# Patient Record
Sex: MALE | Race: BLACK OR AFRICAN AMERICAN | NOT HISPANIC OR LATINO | Employment: OTHER | URBAN - METROPOLITAN AREA
[De-identification: names, ages, dates, MRNs, and addresses within clinical notes are randomized per-mention and may not be internally consistent; named-entity substitution may affect disease eponyms.]

---

## 2023-05-08 NOTE — PROGRESS NOTES
Subjective   Patient ID: Flakito Mcfarlane is a 83 y.o. male who is long term resident being seen and evaluated for multiple medical problems and monthly visit.    HPI   A 82-year-old male who is a long term resident of [facility] at [facility]. He has a medical hx of Parkinson's disease, HTN, Dementia/Alzheimer, BPH, covid (1/18/23) and physical debility. Resident is poor historian but able to answer yes and no questions. Per discussion with nursing staff, resident tolerate PO diet, no complaint of pain during this time. He denies chest pain, SOB, afebrile, N/V, constipation per staff reported.     Review of Systems  Ros LIMITED DUE TO DEMENTIA   Objective   Weight: 193.0 5/5/2023 16:32  Blood Pressure: 135 /  71 5/1/2023 15:31  Temperature: 98.2 5/1/2023 15:31  Pulse: 76 5/1/2023 15:31  Respirations: 16 5/1/2023 15:31  Blood Sugar:     O2 sats: 96.0 % 5/1/2023 15:31  Height: 70.0 2/9/2022 11:40  Pain Level: 0 5/1/2023 15:31  LAB RESULT ON 5/19/23   CBC and Differential       White Blood Cell Count  4.26 k/uL 3.70-11.00  Final           RBC  4.01 m/uL 4.20-6.00 L Final           Hemoglobin  11.5 g/dL 13.0-17.0 L Final           Hematocrit  36.2 % 39.0-51.0 L Final           MCV  90.3 fL 80.0-100.0  Final           MCH  28.7 pg 26.0-34.0  Final           MCHC  31.8 g/dL 30.5-36.0  Final           RDW-CV  13.3 % 11.5-15.0  Final           Platelet Count  209 k/uL 150-400  Final           MPV  10.1 fL 9.0-12.7  Final           Neut%  34.2 %   Final           Abs Neut  1.46 k/uL 1.45-7.50  Final           Lymph%  42.3 %   Final           Abs Lymph  1.80 k/uL 1.00-4.00  Final           Mono%  14.8 %   Final           Abs Mono  0.63 k/uL <0.87  Final           Eosin%  7.7 %   Final           Abs Eosin  0.33 k/uL <0.46  Final           Baso%  0.5 %   Final           Abs Baso  <0.03 k/uL <0.11  Final           Immature Gran %%  0.5 %   Final           Abs Immature Gran  <0.03 k/uL <0.10  Final           Absolute  nRBC  0.0 /100 WBC   Final           Absolute nRBC  <0.01 k/uL <0.01  Final           Diff Type  Auto    Final       Comp Metabolic Panel       Protein, Total  6.2 g/dL 6.3-8.0 L Final           Albumin  3.7 g/dL 3.9-4.9 L Final           Calcium, Total  8.6 mg/dL 8.5-10.2  Final           Bilirubin, Total  0.4 mg/dL 0.2-1.3  Final           Alkaline Phosphatase  49 U/L   Final           AST  15 U/L 14-40  Final           ALT  7 U/L 10-54 L Final           Glucose  81 mg/dL 74-99  Final      The American Diabetes Association (ADA) provides guidance for cutoff values for fasting glucose and random glucose. The ADA defines fasting as no caloric intake for at least 8 hours. Fasting plasma glucose results between 100 to 125 mg/dL indicate increased risk for diabetes (prediabetes).  Fasting plasma glucose results greater than or equal to 126 mg/dL meet the criteria for diagnosis of diabetes. In the absence of unequivocal hyperglycemia, results should be confirmed by repeat testing. In a patient with classic symptoms of hyperglycemia or hyperglycemic crisis, random plasma glucose results greater than or equal to 200 mg/dL meet the criteria for diagnosis of diabetes.  Reference: Standards of Medical Care in Diabetes 2016, American Diabetes Association. Diabetes Care. 2016.39(Suppl 1).       BUN  15 mg/dL 9-24  Final           Creatinine  0.94 mg/dL 0.73-1.22  Final           Sodium  143 mmol/L 136-144  Final           Potassium  3.8 mmol/L 3.7-5.1  Final           Chloride  105 mmol/L   Final           CO2  27 mmol/L 22-30  Final           Anion Gap  11 mmol/L 9-18  Final           Estimated Glomerular Filtration Rate  80 mL/min/1.73 meters squared >=60  Final      Estimated Glomerular Filtration Rate (eGFR) is calculated using the 2021 CKD-EPI creatinine equation. This equation utilizes serum creatinine, sex, and age as parameters. The creatinine assay has traceable calibration to isotope dilution-mass  spectrometry. Refer to KDIGO guidelines for clinical interpretation. In patients with unstable renal function, e.g. those with acute kidney injury, the eGFR may not accurately reflect actual GFR.   MAGNESIUM  2.0 mg/dL 1.7-2.3  Final  Physical Exam  Constitutional: awake, afebrile, not in acute distress  Eyes: clear sclera  ENMT: mucous membranes moist  Head/Neck: neck supple, trachea midline, no JVD  Respiratory/Thorax: CTAB with non labored breathing   Cardiovascular: Regular rate   Gastrointestinal: soft, nt/nd/ng/nr  Musculoskeletal: Move all extremities   Extremities: no edema, no cellulitis  Neurological: A&O x1, able to answer simple questions   Psychological: Appropriate mood and behavior  Skin: warm and dry, intact  Assessment/Plan   #Advanced COPD; controlled, Patient denies SOB, PAULINO   -c/w Budesonide and albuterol as needed   #Dementia without behavioral disturbance   -Behavior monitoring for the following (1) mood changes(2) depressed mood (3) anxiety/agitation (4) hallucinations/delusions   -F/U with Psych   -c/w Medication treatment Plan   -c/w Memantine   #Parkinson's Disease (G20); stable    -monitor increase in tremors   -monitor labs   -consult neurology as needed   -c/w Carbidopa-Levodopa  #HTN; BP fairly controlled   - continue Hydralazine as needed, Lasix daily and Potassium supplement while on diuretic    #Chronic Pain Management   -c/w Tylenol  #BPH  - c/w Flomax    #Constipation   -c/w Senna, Miralax  #Physical debility  - keep safe and fall precaution  #Code status; full code   #Med reviewed  #Lab examined     Time spending 35 minutes including discussion about goal of care.  -Reviewed VS, BS, O2, etc as per protocol. Reviewed and signed off orders, medications, Labs, x-rays, and current diagnoses in PCC. Obtained history via interviewed nursing staff.   -Performing physical examination  -Patient's family updated and code status discussed. He remains full code.   -Ordering medications, lab    -Documenting clinical information in the  Epic  -Care coordinating with nursing staff

## 2023-05-09 ENCOUNTER — NURSING HOME VISIT (OUTPATIENT)
Dept: POST ACUTE CARE | Facility: EXTERNAL LOCATION | Age: 83
End: 2023-05-09
Payer: COMMERCIAL

## 2023-05-09 DIAGNOSIS — J44.9 ADVANCED COPD (MULTI): Primary | ICD-10-CM

## 2023-05-09 DIAGNOSIS — Z71.89 ACP (ADVANCE CARE PLANNING): ICD-10-CM

## 2023-05-09 DIAGNOSIS — N40.0 BENIGN PROSTATIC HYPERPLASIA, UNSPECIFIED WHETHER LOWER URINARY TRACT SYMPTOMS PRESENT: ICD-10-CM

## 2023-05-09 DIAGNOSIS — Z79.899 ENCOUNTER FOR MEDICATION REVIEW: ICD-10-CM

## 2023-05-09 DIAGNOSIS — I10 PRIMARY HYPERTENSION: ICD-10-CM

## 2023-05-09 DIAGNOSIS — F02.80 DEMENTIA DUE TO PARKINSON'S DISEASE, WITHOUT BEHAVIORAL DISTURBANCE, PSYCHOTIC DISTURBANCE, MOOD DISTURBANCE, OR ANXIETY, UNSPECIFIED DEMENTIA SEVERITY (MULTI): ICD-10-CM

## 2023-05-09 DIAGNOSIS — G20.A1 DEMENTIA DUE TO PARKINSON'S DISEASE, WITHOUT BEHAVIORAL DISTURBANCE, PSYCHOTIC DISTURBANCE, MOOD DISTURBANCE, OR ANXIETY, UNSPECIFIED DEMENTIA SEVERITY (MULTI): ICD-10-CM

## 2023-05-09 DIAGNOSIS — G20.A1 PARKINSON DISEASE (MULTI): ICD-10-CM

## 2023-05-09 DIAGNOSIS — R53.81 PHYSICAL DEBILITY: ICD-10-CM

## 2023-05-09 DIAGNOSIS — Z01.89 LABORATORY EXAMINATION: ICD-10-CM

## 2023-05-09 DIAGNOSIS — K59.00 CONSTIPATION, UNSPECIFIED CONSTIPATION TYPE: ICD-10-CM

## 2023-05-09 PROCEDURE — 99309 SBSQ NF CARE MODERATE MDM 30: CPT | Performed by: NURSE PRACTITIONER

## 2023-05-09 NOTE — LETTER
Patient: Flakito Mcfarlane  : 1940    Encounter Date: 2023    Subjective  Patient ID: Flakito Mcfarlane is a 83 y.o. male who is long term resident being seen and evaluated for multiple medical problems and monthly visit.    HPI   A 82-year-old male who is a long term resident of [facility] at [facility]. He has a medical hx of Parkinson's disease, HTN, Dementia/Alzheimer, BPH, covid (23) and physical debility. Resident is poor historian but able to answer yes and no questions. Per discussion with nursing staff, resident tolerate PO diet, no complaint of pain during this time. He denies chest pain, SOB, afebrile, N/V, constipation per staff reported.     Review of Systems  Ros LIMITED DUE TO DEMENTIA   Objective  Weight: 193.0 2023 16:32  Blood Pressure: 135 /  71 2023 15:31  Temperature: 98.2 2023 15:31  Pulse: 76 2023 15:31  Respirations: 16 2023 15:31  Blood Sugar:     O2 sats: 96.0 % 2023 15:31  Height: 70.0 2022 11:40  Pain Level: 0 2023 15:31  LAB RESULT ON 23   CBC and Differential       White Blood Cell Count  4.26 k/uL 3.70-11.00  Final           RBC  4.01 m/uL 4.20-6.00 L Final           Hemoglobin  11.5 g/dL 13.0-17.0 L Final           Hematocrit  36.2 % 39.0-51.0 L Final           MCV  90.3 fL 80.0-100.0  Final           MCH  28.7 pg 26.0-34.0  Final           MCHC  31.8 g/dL 30.5-36.0  Final           RDW-CV  13.3 % 11.5-15.0  Final           Platelet Count  209 k/uL 150-400  Final           MPV  10.1 fL 9.0-12.7  Final           Neut%  34.2 %   Final           Abs Neut  1.46 k/uL 1.45-7.50  Final           Lymph%  42.3 %   Final           Abs Lymph  1.80 k/uL 1.00-4.00  Final           Mono%  14.8 %   Final           Abs Mono  0.63 k/uL <0.87  Final           Eosin%  7.7 %   Final           Abs Eosin  0.33 k/uL <0.46  Final           Baso%  0.5 %   Final           Abs Baso  <0.03 k/uL <0.11  Final           Immature Gran %%  0.5 %   Final           Abs  Immature Gran  <0.03 k/uL <0.10  Final           Absolute nRBC  0.0 /100 WBC   Final           Absolute nRBC  <0.01 k/uL <0.01  Final           Diff Type  Auto    Final       Comp Metabolic Panel       Protein, Total  6.2 g/dL 6.3-8.0 L Final           Albumin  3.7 g/dL 3.9-4.9 L Final           Calcium, Total  8.6 mg/dL 8.5-10.2  Final           Bilirubin, Total  0.4 mg/dL 0.2-1.3  Final           Alkaline Phosphatase  49 U/L   Final           AST  15 U/L 14-40  Final           ALT  7 U/L 10-54 L Final           Glucose  81 mg/dL 74-99  Final      The American Diabetes Association (ADA) provides guidance for cutoff values for fasting glucose and random glucose. The ADA defines fasting as no caloric intake for at least 8 hours. Fasting plasma glucose results between 100 to 125 mg/dL indicate increased risk for diabetes (prediabetes).  Fasting plasma glucose results greater than or equal to 126 mg/dL meet the criteria for diagnosis of diabetes. In the absence of unequivocal hyperglycemia, results should be confirmed by repeat testing. In a patient with classic symptoms of hyperglycemia or hyperglycemic crisis, random plasma glucose results greater than or equal to 200 mg/dL meet the criteria for diagnosis of diabetes.  Reference: Standards of Medical Care in Diabetes 2016, American Diabetes Association. Diabetes Care. 2016.39(Suppl 1).       BUN  15 mg/dL 9-24  Final           Creatinine  0.94 mg/dL 0.73-1.22  Final           Sodium  143 mmol/L 136-144  Final           Potassium  3.8 mmol/L 3.7-5.1  Final           Chloride  105 mmol/L   Final           CO2  27 mmol/L 22-30  Final           Anion Gap  11 mmol/L 9-18  Final           Estimated Glomerular Filtration Rate  80 mL/min/1.73 meters squared >=60  Final      Estimated Glomerular Filtration Rate (eGFR) is calculated using the 2021 CKD-EPI creatinine equation. This equation utilizes serum creatinine, sex, and age as parameters. The creatinine assay  has traceable calibration to isotope dilution-mass spectrometry. Refer to KDIGO guidelines for clinical interpretation. In patients with unstable renal function, e.g. those with acute kidney injury, the eGFR may not accurately reflect actual GFR.   MAGNESIUM  2.0 mg/dL 1.7-2.3  Final  Physical Exam  Constitutional: awake, afebrile, not in acute distress  Eyes: clear sclera  ENMT: mucous membranes moist  Head/Neck: neck supple, trachea midline, no JVD  Respiratory/Thorax: CTAB with non labored breathing   Cardiovascular: Regular rate   Gastrointestinal: soft, nt/nd/ng/nr  Musculoskeletal: Move all extremities   Extremities: no edema, no cellulitis  Neurological: A&O x1, able to answer simple questions   Psychological: Appropriate mood and behavior  Skin: warm and dry, intact  Assessment/Plan  #Advanced COPD; controlled, Patient denies SOB, PAULINO   -c/w Budesonide and albuterol as needed   #Dementia without behavioral disturbance   -Behavior monitoring for the following (1) mood changes(2) depressed mood (3) anxiety/agitation (4) hallucinations/delusions   -F/U with Psych   -c/w Medication treatment Plan   -c/w Memantine   #Parkinson's Disease (G20); stable    -monitor increase in tremors   -monitor labs   -consult neurology as needed   -c/w Carbidopa-Levodopa  #HTN; BP fairly controlled   - continue Hydralazine as needed, Lasix daily and Potassium supplement while on diuretic    #Chronic Pain Management   -c/w Tylenol  #BPH  - c/w Flomax    #Constipation   -c/w Senna, Miralax  #Physical debility  - keep safe and fall precaution  #Code status; full code   #Med reviewed  #Lab examined     Time spending 35 minutes including discussion about goal of care.  -Reviewed VS, BS, O2, etc as per protocol. Reviewed and signed off orders, medications, Labs, x-rays, and current diagnoses in PCC. Obtained history via interviewed nursing staff.   -Performing physical examination  -Patient's family updated and code status discussed. He  remains full code.   -Ordering medications, lab   -Documenting clinical information in the  Epic  -Care coordinating with nursing staff      Electronically Signed By: BIANKA Dasilva-CNP   5/31/23 12:54 AM

## 2023-05-31 PROBLEM — R53.81 PHYSICAL DEBILITY: Status: ACTIVE | Noted: 2023-05-31

## 2023-05-31 PROBLEM — J44.9 ADVANCED COPD (MULTI): Status: ACTIVE | Noted: 2023-05-31

## 2023-05-31 PROBLEM — G20.A1: Status: ACTIVE | Noted: 2023-05-31

## 2023-05-31 PROBLEM — K59.00 CONSTIPATION: Status: ACTIVE | Noted: 2023-05-31

## 2023-05-31 PROBLEM — G20.A1 PARKINSON DISEASE (MULTI): Status: ACTIVE | Noted: 2023-05-31

## 2023-05-31 PROBLEM — Z71.89 ACP (ADVANCE CARE PLANNING): Status: ACTIVE | Noted: 2023-05-31

## 2023-05-31 PROBLEM — N40.0 BENIGN PROSTATIC HYPERPLASIA: Status: ACTIVE | Noted: 2023-05-31

## 2023-05-31 PROBLEM — I10 PRIMARY HYPERTENSION: Status: ACTIVE | Noted: 2023-05-31

## 2023-05-31 PROBLEM — F02.80: Status: ACTIVE | Noted: 2023-05-31

## 2023-06-21 ENCOUNTER — NURSING HOME VISIT (OUTPATIENT)
Dept: POST ACUTE CARE | Facility: EXTERNAL LOCATION | Age: 83
End: 2023-06-21
Payer: COMMERCIAL

## 2023-06-21 DIAGNOSIS — F02.818 LATE ONSET ALZHEIMER'S DISEASE WITH BEHAVIORAL DISTURBANCE (MULTI): Primary | ICD-10-CM

## 2023-06-21 DIAGNOSIS — F02.A0 MILD DEMENTIA DUE TO PARKINSON'S DISEASE, WITHOUT BEHAVIORAL DISTURBANCE, PSYCHOTIC DISTURBANCE, MOOD DISTURBANCE, OR ANXIETY (MULTI): ICD-10-CM

## 2023-06-21 DIAGNOSIS — N40.1 BENIGN PROSTATIC HYPERPLASIA WITH URINARY FREQUENCY: ICD-10-CM

## 2023-06-21 DIAGNOSIS — G30.1 LATE ONSET ALZHEIMER'S DISEASE WITH BEHAVIORAL DISTURBANCE (MULTI): Primary | ICD-10-CM

## 2023-06-21 DIAGNOSIS — R53.81 PHYSICAL DEBILITY: ICD-10-CM

## 2023-06-21 DIAGNOSIS — K59.03 DRUG-INDUCED CONSTIPATION: ICD-10-CM

## 2023-06-21 DIAGNOSIS — R35.0 BENIGN PROSTATIC HYPERPLASIA WITH URINARY FREQUENCY: ICD-10-CM

## 2023-06-21 DIAGNOSIS — G20.A1 MILD DEMENTIA DUE TO PARKINSON'S DISEASE, WITHOUT BEHAVIORAL DISTURBANCE, PSYCHOTIC DISTURBANCE, MOOD DISTURBANCE, OR ANXIETY (MULTI): ICD-10-CM

## 2023-06-21 DIAGNOSIS — J44.9 ADVANCED COPD (MULTI): ICD-10-CM

## 2023-06-21 DIAGNOSIS — G20.A1 PARKINSON DISEASE (MULTI): ICD-10-CM

## 2023-06-21 DIAGNOSIS — I10 PRIMARY HYPERTENSION: ICD-10-CM

## 2023-06-21 PROCEDURE — 99309 SBSQ NF CARE MODERATE MDM 30: CPT | Performed by: FAMILY MEDICINE

## 2023-07-04 ENCOUNTER — NURSING HOME VISIT (OUTPATIENT)
Dept: POST ACUTE CARE | Facility: EXTERNAL LOCATION | Age: 83
End: 2023-07-04
Payer: COMMERCIAL

## 2023-07-04 DIAGNOSIS — K59.03 DRUG-INDUCED CONSTIPATION: ICD-10-CM

## 2023-07-04 DIAGNOSIS — Z71.89 ACP (ADVANCE CARE PLANNING): ICD-10-CM

## 2023-07-04 DIAGNOSIS — G20.A1 MILD DEMENTIA DUE TO PARKINSON'S DISEASE, WITHOUT BEHAVIORAL DISTURBANCE, PSYCHOTIC DISTURBANCE, MOOD DISTURBANCE, OR ANXIETY (MULTI): ICD-10-CM

## 2023-07-04 DIAGNOSIS — N40.1 BENIGN PROSTATIC HYPERPLASIA WITH URINARY FREQUENCY: ICD-10-CM

## 2023-07-04 DIAGNOSIS — R53.81 PHYSICAL DEBILITY: ICD-10-CM

## 2023-07-04 DIAGNOSIS — F02.A0 MILD DEMENTIA DUE TO PARKINSON'S DISEASE, WITHOUT BEHAVIORAL DISTURBANCE, PSYCHOTIC DISTURBANCE, MOOD DISTURBANCE, OR ANXIETY (MULTI): ICD-10-CM

## 2023-07-04 DIAGNOSIS — J44.9 ADVANCED COPD (MULTI): ICD-10-CM

## 2023-07-04 DIAGNOSIS — G20.A1 PARKINSON DISEASE (MULTI): Primary | ICD-10-CM

## 2023-07-04 DIAGNOSIS — R35.0 BENIGN PROSTATIC HYPERPLASIA WITH URINARY FREQUENCY: ICD-10-CM

## 2023-07-04 DIAGNOSIS — I10 PRIMARY HYPERTENSION: ICD-10-CM

## 2023-07-04 PROCEDURE — 99309 SBSQ NF CARE MODERATE MDM 30: CPT | Performed by: FAMILY MEDICINE

## 2023-07-04 NOTE — LETTER
Patient: Flakito Mcfarlane  : 1940    Encounter Date: 2023    Subjective  Patient ID: Flakito Mcfarlane is a 83 y.o. male who is long term resident being seen and evaluated for multiple medical problems and monthly visit.    HPI   A 82-year-old male who is a long term resident of [facility] at [facility]. He has a medical hx of Parkinson's disease, HTN, Dementia/Alzheimer, BPH, covid (23) and physical debility. Resident is poor historian but able to answer yes and no questions. Per discussion with nursing staff, resident tolerate PO diet, no complaint of pain during this time. He denies chest pain, SOB, afebrile, N/V, constipation per staff reported.     Review of Systems  Ros LIMITED DUE TO DEMENTIA   Objective  Weight: 193.0 2023 16:32  Blood Pressure: 135 /  71 2023 15:31  Temperature: 98.2 2023 15:31  Pulse: 76 2023 15:31  Respirations: 16 2023 15:31  Blood Sugar:     O2 sats: 96.0 % 2023 15:31  Height: 70.0 2022 11:40  Pain Level: 0 2023 15:31  LAB RESULT ON 23   CBC and Differential       White Blood Cell Count  4.26 k/uL 3.70-11.00  Final           RBC  4.01 m/uL 4.20-6.00 L Final           Hemoglobin  11.5 g/dL 13.0-17.0 L Final           Hematocrit  36.2 % 39.0-51.0 L Final           MCV  90.3 fL 80.0-100.0  Final           MCH  28.7 pg 26.0-34.0  Final           MCHC  31.8 g/dL 30.5-36.0  Final           RDW-CV  13.3 % 11.5-15.0  Final           Platelet Count  209 k/uL 150-400  Final           MPV  10.1 fL 9.0-12.7  Final           Neut%  34.2 %   Final           Abs Neut  1.46 k/uL 1.45-7.50  Final           Lymph%  42.3 %   Final           Abs Lymph  1.80 k/uL 1.00-4.00  Final           Mono%  14.8 %   Final           Abs Mono  0.63 k/uL <0.87  Final           Eosin%  7.7 %   Final           Abs Eosin  0.33 k/uL <0.46  Final           Baso%  0.5 %   Final           Abs Baso  <0.03 k/uL <0.11  Final           Immature Gran %%  0.5 %   Final           Abs  Immature Gran  <0.03 k/uL <0.10  Final           Absolute nRBC  0.0 /100 WBC   Final           Absolute nRBC  <0.01 k/uL <0.01  Final           Diff Type  Auto    Final       Comp Metabolic Panel       Protein, Total  6.2 g/dL 6.3-8.0 L Final           Albumin  3.7 g/dL 3.9-4.9 L Final           Calcium, Total  8.6 mg/dL 8.5-10.2  Final           Bilirubin, Total  0.4 mg/dL 0.2-1.3  Final           Alkaline Phosphatase  49 U/L   Final           AST  15 U/L 14-40  Final           ALT  7 U/L 10-54 L Final           Glucose  81 mg/dL 74-99  Final      The American Diabetes Association (ADA) provides guidance for cutoff values for fasting glucose and random glucose. The ADA defines fasting as no caloric intake for at least 8 hours. Fasting plasma glucose results between 100 to 125 mg/dL indicate increased risk for diabetes (prediabetes).  Fasting plasma glucose results greater than or equal to 126 mg/dL meet the criteria for diagnosis of diabetes. In the absence of unequivocal hyperglycemia, results should be confirmed by repeat testing. In a patient with classic symptoms of hyperglycemia or hyperglycemic crisis, random plasma glucose results greater than or equal to 200 mg/dL meet the criteria for diagnosis of diabetes.  Reference: Standards of Medical Care in Diabetes 2016, American Diabetes Association. Diabetes Care. 2016.39(Suppl 1).       BUN  15 mg/dL 9-24  Final           Creatinine  0.94 mg/dL 0.73-1.22  Final           Sodium  143 mmol/L 136-144  Final           Potassium  3.8 mmol/L 3.7-5.1  Final           Chloride  105 mmol/L   Final           CO2  27 mmol/L 22-30  Final           Anion Gap  11 mmol/L 9-18  Final           Estimated Glomerular Filtration Rate  80 mL/min/1.73 meters squared >=60  Final      Estimated Glomerular Filtration Rate (eGFR) is calculated using the 2021 CKD-EPI creatinine equation. This equation utilizes serum creatinine, sex, and age as parameters. The creatinine assay  has traceable calibration to isotope dilution-mass spectrometry. Refer to KDIGO guidelines for clinical interpretation. In patients with unstable renal function, e.g. those with acute kidney injury, the eGFR may not accurately reflect actual GFR.   MAGNESIUM  2.0 mg/dL 1.7-2.3  Final  Physical Exam  Constitutional: awake, afebrile, not in acute distress  Eyes: clear sclera  ENMT: mucous membranes moist  Head/Neck: neck supple, trachea midline, no JVD  Respiratory/Thorax: CTAB with non labored breathing   Cardiovascular: Regular rate   Gastrointestinal: soft, nt/nd/ng/nr  Musculoskeletal: Move all extremities   Extremities: no edema, no cellulitis  Neurological: A&O x1, able to answer simple questions   Psychological: Appropriate mood and behavior  Skin: warm and dry, intact  Assessment/Plan  #Advanced COPD; controlled, Patient denies SOB, PAULINO   -c/w Budesonide and albuterol as needed   #Dementia without behavioral disturbance   -Behavior monitoring for the following (1) mood changes(2) depressed mood (3) anxiety/agitation (4) hallucinations/delusions   -F/U with Psych   -c/w Medication treatment Plan   -c/w Memantine   #Parkinson's Disease (G20); stable    -monitor increase in tremors   -monitor labs   -consult neurology as needed   -c/w Carbidopa-Levodopa  #HTN; BP fairly controlled   - continue Hydralazine as needed, Lasix daily and Potassium supplement while on diuretic    #Chronic Pain Management   -c/w Tylenol  #BPH  - c/w Flomax    #Constipation   -c/w Senna, Miralax  #Physical debility  - keep safe and fall precaution  #Code status; full code   #Med reviewed  #Lab examined   Problem List Items Addressed This Visit       Advanced COPD (CMS/HCC)    Dementia due to Parkinson's disease, without behavioral disturbance, psychotic disturbance, mood disturbance, or anxiety (CMS/HCC)    Parkinson disease (CMS/HCC) - Primary    Primary hypertension    Benign prostatic hyperplasia    Constipation    Physical debility     ACP (advance care planning)      Time spending 35 minutes including discussion about goal of care.  -Reviewed VS, BS, O2, etc as per protocol. Reviewed and signed off orders, medications, Labs, x-rays, and current diagnoses in PCC. Obtained history via interviewed nursing staff.   -Performing physical examination  -Patient's family updated and code status discussed. He remains full code.   -Ordering medications, lab   -Documenting clinical information in the [facility]  -Care coordinating with nursing staff      Electronically Signed By: Stephen Buckner MD   7/22/23  8:12 AM

## 2023-07-22 PROBLEM — F02.818 LATE ONSET ALZHEIMER'S DISEASE WITH BEHAVIORAL DISTURBANCE (MULTI): Status: ACTIVE | Noted: 2023-07-22

## 2023-07-22 PROBLEM — G30.1 LATE ONSET ALZHEIMER'S DISEASE WITH BEHAVIORAL DISTURBANCE (MULTI): Status: ACTIVE | Noted: 2023-07-22

## 2023-07-22 ASSESSMENT — PATIENT HEALTH QUESTIONNAIRE - PHQ9
SUM OF ALL RESPONSES TO PHQ9 QUESTIONS 1 AND 2: 0
1. LITTLE INTEREST OR PLEASURE IN DOING THINGS: NOT AT ALL
2. FEELING DOWN, DEPRESSED OR HOPELESS: NOT AT ALL

## 2023-07-22 NOTE — PROGRESS NOTES
Subjective   Patient ID: Flakito Mcfarlane is a 83 y.o. male who is long term resident being seen and evaluated for multiple medical problems and monthly visit.    HPI   A 82-year-old male who is a long term resident of [facility]. He has a medical hx of Parkinson's disease, HTN, Dementia/Alzheimer, BPH, covid (1/18/23) and physical debility. Resident is poor historian but able to answer yes and no questions. Per discussion with nursing staff, resident tolerate PO diet, no complaint of pain during this time. He denies chest pain, SOB, afebrile, N/V, constipation per staff reported.     Review of Systems  Ros LIMITED DUE TO DEMENTIA   Objective   Weight: 193.0 5/5/2023 16:32  Blood Pressure: 135 /  71 5/1/2023 15:31  Temperature: 98.2 5/1/2023 15:31  Pulse: 76 5/1/2023 15:31  Respirations: 16 5/1/2023 15:31  Blood Sugar:     O2 sats: 96.0 % 5/1/2023 15:31  Height: 70.0 2/9/2022 11:40  Pain Level: 0 5/1/2023 15:31  LAB RESULT ON 5/19/23   CBC and Differential       White Blood Cell Count  4.26 k/uL 3.70-11.00  Final           RBC  4.01 m/uL 4.20-6.00 L Final           Hemoglobin  11.5 g/dL 13.0-17.0 L Final           Hematocrit  36.2 % 39.0-51.0 L Final           MCV  90.3 fL 80.0-100.0  Final           MCH  28.7 pg 26.0-34.0  Final           MCHC  31.8 g/dL 30.5-36.0  Final           RDW-CV  13.3 % 11.5-15.0  Final           Platelet Count  209 k/uL 150-400  Final           MPV  10.1 fL 9.0-12.7  Final           Neut%  34.2 %   Final           Abs Neut  1.46 k/uL 1.45-7.50  Final           Lymph%  42.3 %   Final           Abs Lymph  1.80 k/uL 1.00-4.00  Final           Mono%  14.8 %   Final           Abs Mono  0.63 k/uL <0.87  Final           Eosin%  7.7 %   Final           Abs Eosin  0.33 k/uL <0.46  Final           Baso%  0.5 %   Final           Abs Baso  <0.03 k/uL <0.11  Final           Immature Gran %%  0.5 %   Final           Abs Immature Gran  <0.03 k/uL <0.10  Final           Absolute  nRBC  0.0 /100 WBC   Final           Absolute nRBC  <0.01 k/uL <0.01  Final           Diff Type  Auto    Final       Comp Metabolic Panel       Protein, Total  6.2 g/dL 6.3-8.0 L Final           Albumin  3.7 g/dL 3.9-4.9 L Final           Calcium, Total  8.6 mg/dL 8.5-10.2  Final           Bilirubin, Total  0.4 mg/dL 0.2-1.3  Final           Alkaline Phosphatase  49 U/L   Final           AST  15 U/L 14-40  Final           ALT  7 U/L 10-54 L Final           Glucose  81 mg/dL 74-99  Final      The American Diabetes Association (ADA) provides guidance for cutoff values for fasting glucose and random glucose. The ADA defines fasting as no caloric intake for at least 8 hours. Fasting plasma glucose results between 100 to 125 mg/dL indicate increased risk for diabetes (prediabetes).  Fasting plasma glucose results greater than or equal to 126 mg/dL meet the criteria for diagnosis of diabetes. In the absence of unequivocal hyperglycemia, results should be confirmed by repeat testing. In a patient with classic symptoms of hyperglycemia or hyperglycemic crisis, random plasma glucose results greater than or equal to 200 mg/dL meet the criteria for diagnosis of diabetes.  Reference: Standards of Medical Care in Diabetes 2016, American Diabetes Association. Diabetes Care. 2016.39(Suppl 1).       BUN  15 mg/dL 9-24  Final           Creatinine  0.94 mg/dL 0.73-1.22  Final           Sodium  143 mmol/L 136-144  Final           Potassium  3.8 mmol/L 3.7-5.1  Final           Chloride  105 mmol/L   Final           CO2  27 mmol/L 22-30  Final           Anion Gap  11 mmol/L 9-18  Final           Estimated Glomerular Filtration Rate  80 mL/min/1.73 meters squared >=60  Final      Estimated Glomerular Filtration Rate (eGFR) is calculated using the 2021 CKD-EPI creatinine equation. This equation utilizes serum creatinine, sex, and age as parameters. The creatinine assay has traceable calibration to isotope dilution-mass  spectrometry. Refer to KDIGO guidelines for clinical interpretation. In patients with unstable renal function, e.g. those with acute kidney injury, the eGFR may not accurately reflect actual GFR.   MAGNESIUM  2.0 mg/dL 1.7-2.3  Final  Physical Exam  Constitutional: awake, afebrile, not in acute distress  Eyes: clear sclera  ENMT: mucous membranes moist  Head/Neck: neck supple, trachea midline, no JVD  Respiratory/Thorax: CTAB with non labored breathing   Cardiovascular: Regular rate   Gastrointestinal: soft, nt/nd/ng/nr  Musculoskeletal: Move all extremities   Extremities: no edema, no cellulitis  Neurological: A&O x1, able to answer simple questions   Psychological: Appropriate mood and behavior  Skin: warm and dry, intact  Assessment/Plan   #Advanced COPD; controlled, Patient denies SOB, PAULINO   -c/w Budesonide and albuterol as needed   #Dementia without behavioral disturbance   -Behavior monitoring for the following (1) mood changes(2) depressed mood (3) anxiety/agitation (4) hallucinations/delusions   -F/U with Psych   -c/w Medication treatment Plan   -c/w Memantine   #Parkinson's Disease (G20); stable    -monitor increase in tremors   -monitor labs   -consult neurology as needed   -c/w Carbidopa-Levodopa  #HTN; BP fairly controlled   - continue Hydralazine as needed, Lasix daily and Potassium supplement while on diuretic    #Chronic Pain Management   -c/w Tylenol  #BPH  - c/w Flomax    #Constipation   -c/w Senna, Miralax  #Physical debility  - keep safe and fall precaution  #Code status; full code   #Med reviewed  #Lab examined   Problem List Items Addressed This Visit       Advanced COPD (CMS/[facility])    Dementia due to Parkinson's disease, without behavioral disturbance, psychotic disturbance, mood disturbance, or anxiety (CMS/HCC)    Parkinson disease (CMS/HCC) - Primary    Primary hypertension    Benign prostatic hyperplasia    Constipation    Physical debility    ACP (advance care planning)      Time spending 35  minutes including discussion about goal of care.  -Reviewed VS, BS, O2, etc as per protocol. Reviewed and signed off orders, medications, Labs, x-rays, and current diagnoses in PCC. Obtained history via interviewed nursing staff.   -Performing physical examination  -Patient's family updated and code status discussed. He remains full code.   -Ordering medications, lab   -Documenting clinical information in the [facility]  -Care coordinating with nursing staff

## 2023-07-22 NOTE — PROGRESS NOTES
Subjective   Patient ID: Flakito Mcfarlane is a 83 y.o. male who is long term resident being seen and evaluated for multiple medical problems and monthly visit.    HPI   A 82-year-old male who is a long term resident of [facility]. He has a medical hx of Parkinson's disease, HTN, Dementia/Alzheimer, BPH, covid (1/18/23) and physical debility. Resident is poor historian but able to answer yes and no questions. Per discussion with nursing staff, resident tolerate PO diet, no complaint of pain during this time. He denies chest pain, SOB, afebrile, N/V, constipation per staff reported.     Review of Systems  Ros LIMITED DUE TO DEMENTIA   Objective   Weight: 193.0 5/5/2023 16:32  Blood Pressure: 135 /  71 5/1/2023 15:31  Temperature: 98.2 5/1/2023 15:31  Pulse: 76 5/1/2023 15:31  Respirations: 16 5/1/2023 15:31  Blood Sugar:     O2 sats: 96.0 % 5/1/2023 15:31  Height: 70.0 2/9/2022 11:40  Pain Level: 0 5/1/2023 15:31  LAB RESULT ON 5/19/23   CBC and Differential       White Blood Cell Count  4.26 k/uL 3.70-11.00  Final           RBC  4.01 m/uL 4.20-6.00 L Final           Hemoglobin  11.5 g/dL 13.0-17.0 L Final           Hematocrit  36.2 % 39.0-51.0 L Final           MCV  90.3 fL 80.0-100.0  Final           MCH  28.7 pg 26.0-34.0  Final           MCHC  31.8 g/dL 30.5-36.0  Final           RDW-CV  13.3 % 11.5-15.0  Final           Platelet Count  209 k/uL 150-400  Final           MPV  10.1 fL 9.0-12.7  Final           Neut%  34.2 %   Final           Abs Neut  1.46 k/uL 1.45-7.50  Final           Lymph%  42.3 %   Final           Abs Lymph  1.80 k/uL 1.00-4.00  Final           Mono%  14.8 %   Final           Abs Mono  0.63 k/uL <0.87  Final           Eosin%  7.7 %   Final           Abs Eosin  0.33 k/uL <0.46  Final           Baso%  0.5 %   Final           Abs Baso  <0.03 k/uL <0.11  Final           Immature Gran %%  0.5 %   Final           Abs Immature Gran  <0.03 k/uL <0.10  Final           Absolute  nRBC  0.0 /100 WBC   Final           Absolute nRBC  <0.01 k/uL <0.01  Final           Diff Type  Auto    Final       Comp Metabolic Panel       Protein, Total  6.2 g/dL 6.3-8.0 L Final           Albumin  3.7 g/dL 3.9-4.9 L Final           Calcium, Total  8.6 mg/dL 8.5-10.2  Final           Bilirubin, Total  0.4 mg/dL 0.2-1.3  Final           Alkaline Phosphatase  49 U/L   Final           AST  15 U/L 14-40  Final           ALT  7 U/L 10-54 L Final           Glucose  81 mg/dL 74-99  Final      The American Diabetes Association (ADA) provides guidance for cutoff values for fasting glucose and random glucose. The ADA defines fasting as no caloric intake for at least 8 hours. Fasting plasma glucose results between 100 to 125 mg/dL indicate increased risk for diabetes (prediabetes).  Fasting plasma glucose results greater than or equal to 126 mg/dL meet the criteria for diagnosis of diabetes. In the absence of unequivocal hyperglycemia, results should be confirmed by repeat testing. In a patient with classic symptoms of hyperglycemia or hyperglycemic crisis, random plasma glucose results greater than or equal to 200 mg/dL meet the criteria for diagnosis of diabetes.  Reference: Standards of Medical Care in Diabetes 2016, American Diabetes Association. Diabetes Care. 2016.39(Suppl 1).       BUN  15 mg/dL 9-24  Final           Creatinine  0.94 mg/dL 0.73-1.22  Final           Sodium  143 mmol/L 136-144  Final           Potassium  3.8 mmol/L 3.7-5.1  Final           Chloride  105 mmol/L   Final           CO2  27 mmol/L 22-30  Final           Anion Gap  11 mmol/L 9-18  Final           Estimated Glomerular Filtration Rate  80 mL/min/1.73 meters squared >=60  Final      Estimated Glomerular Filtration Rate (eGFR) is calculated using the 2021 CKD-EPI creatinine equation. This equation utilizes serum creatinine, sex, and age as parameters. The creatinine assay has traceable calibration to isotope dilution-mass  spectrometry. Refer to KDIGO guidelines for clinical interpretation. In patients with unstable renal function, e.g. those with acute kidney injury, the eGFR may not accurately reflect actual GFR.   MAGNESIUM  2.0 mg/dL 1.7-2.3  Final  Physical Exam  Constitutional: awake, afebrile, not in acute distress  Eyes: clear sclera  ENMT: mucous membranes moist  Head/Neck: neck supple, trachea midline, no JVD  Respiratory/Thorax: CTAB with non labored breathing   Cardiovascular: Regular rate   Gastrointestinal: soft, nt/nd/ng/nr  Musculoskeletal: Move all extremities   Extremities: no edema, no cellulitis  Neurological: A&O x1, able to answer simple questions   Psychological: Appropriate mood and behavior  Skin: warm and dry, intact  Assessment/Plan   #Advanced COPD; controlled, Patient denies SOB, PAULINO   -c/w Budesonide and albuterol as needed   #Dementia without behavioral disturbance   -Behavior monitoring for the following (1) mood changes(2) depressed mood (3) anxiety/agitation (4) hallucinations/delusions   -F/U with Psych   -c/w Medication treatment Plan   -c/w Memantine   #Parkinson's Disease (G20); stable    -monitor increase in tremors   -monitor labs   -consult neurology as needed   -c/w Carbidopa-Levodopa  #HTN; BP fairly controlled   - continue Hydralazine as needed, Lasix daily and Potassium supplement while on diuretic    #Chronic Pain Management   -c/w Tylenol  #BPH  - c/w Flomax    #Constipation   -c/w Senna, Miralax  #Physical debility  - keep safe and fall precaution  #Code status; full code   #Med reviewed  #Lab examined   Problem List Items Addressed This Visit       Advanced COPD (CMS/HCC)    Dementia due to Parkinson's disease, without behavioral disturbance, psychotic disturbance, mood disturbance, or anxiety (CMS/HCC)    Parkinson disease (CMS/HCC)    Primary hypertension    Benign prostatic hyperplasia    Constipation    Physical debility    Late onset Alzheimer's disease with behavioral disturbance  (CMS/[facility]) - Primary      Time spending 35 minutes including discussion about goal of care.  -Reviewed VS, BS, O2, etc as per protocol. Reviewed and signed off orders, medications, Labs, x-rays, and current diagnoses in PCC. Obtained history via interviewed nursing staff.   -Performing physical examination  -Patient's family updated and code status discussed. He remains full code.   -Ordering medications, lab   -Documenting clinical information in the [facility]  -Care coordinating with nursing staff

## 2024-04-26 ENCOUNTER — NURSING HOME VISIT (OUTPATIENT)
Dept: POST ACUTE CARE | Facility: EXTERNAL LOCATION | Age: 84
End: 2024-04-26
Payer: COMMERCIAL

## 2024-04-26 DIAGNOSIS — G20.A1 MILD DEMENTIA DUE TO PARKINSON'S DISEASE, WITHOUT BEHAVIORAL DISTURBANCE, PSYCHOTIC DISTURBANCE, MOOD DISTURBANCE, OR ANXIETY (MULTI): ICD-10-CM

## 2024-04-26 DIAGNOSIS — I10 PRIMARY HYPERTENSION: Primary | ICD-10-CM

## 2024-04-26 DIAGNOSIS — F02.A0 MILD DEMENTIA DUE TO PARKINSON'S DISEASE, WITHOUT BEHAVIORAL DISTURBANCE, PSYCHOTIC DISTURBANCE, MOOD DISTURBANCE, OR ANXIETY (MULTI): ICD-10-CM

## 2024-04-26 DIAGNOSIS — R53.81 PHYSICAL DEBILITY: ICD-10-CM

## 2024-04-26 DIAGNOSIS — G30.1 LATE ONSET ALZHEIMER'S DISEASE WITH BEHAVIORAL DISTURBANCE (MULTI): ICD-10-CM

## 2024-04-26 DIAGNOSIS — R35.0 BENIGN PROSTATIC HYPERPLASIA WITH URINARY FREQUENCY: ICD-10-CM

## 2024-04-26 DIAGNOSIS — J44.9 ADVANCED COPD (MULTI): ICD-10-CM

## 2024-04-26 DIAGNOSIS — G20.B2 PARKINSON'S DISEASE WITH DYSKINESIA AND FLUCTUATING MANIFESTATIONS (MULTI): ICD-10-CM

## 2024-04-26 DIAGNOSIS — F02.818 LATE ONSET ALZHEIMER'S DISEASE WITH BEHAVIORAL DISTURBANCE (MULTI): ICD-10-CM

## 2024-04-26 DIAGNOSIS — N40.1 BENIGN PROSTATIC HYPERPLASIA WITH URINARY FREQUENCY: ICD-10-CM

## 2024-04-26 PROCEDURE — 99309 SBSQ NF CARE MODERATE MDM 30: CPT | Performed by: FAMILY MEDICINE

## 2024-04-26 NOTE — LETTER
Patient: Flakito Mcfarlane  : 1940    Encounter Date: 2024    Subjective  Patient ID: Flakito Mcfarlane is a 84 y.o. male who is long term resident being seen and evaluated for multiple medical problems and monthly visit.    HPI   A 82-year-old male who is a long term resident of [facility]. He has a medical hx of Parkinson's disease, HTN, Dementia/Alzheimer, BPH, covid (23) and physical debility. Resident is poor historian but able to answer yes and no questions. Per discussion with nursing staff, resident tolerate PO diet, no complaint of pain during this time. He denies chest pain, SOB, afebrile, N/V, constipation per staff reported.     Review of Systems  Ros LIMITED DUE TO DEMENTIA   Objective  /72  RR 16 P 88     Physical Exam  Constitutional: awake, afebrile, not in acute distress  Eyes: clear sclera  ENMT: mucous membranes moist  Head/Neck: neck supple, trachea midline, no JVD  Respiratory/Thorax: CTAB with non labored breathing   Cardiovascular: Regular rate   Gastrointestinal: soft, nt/nd/ng/nr  Musculoskeletal: Move all extremities   Extremities: no edema, no cellulitis  Neurological: A&O x1, able to answer simple questions   Psychological: Appropriate mood and behavior  Skin: warm and dry, intact    Problem List Items Addressed This Visit             ICD-10-CM    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    Dementia due to Parkinson's disease, without behavioral disturbance, psychotic disturbance, mood disturbance, or anxiety (Multi) G20.A1, F02.80    Parkinson disease (Multi) G20.A1    Primary hypertension - Primary I10    Benign prostatic hyperplasia N40.0    Physical debility R53.81    Late onset Alzheimer's disease with behavioral disturbance (Multi) G30.1, F02.818       Assessment/Plan  #Advanced COPD; controlled, Patient denies SOB, PAULINO   -c/w Budesonide and albuterol as needed   #Dementia without behavioral disturbance   -Behavior monitoring for the following (1) mood changes(2)  depressed mood (3) anxiety/agitation (4) hallucinations/delusions   -F/U with Psych   -c/w Medication treatment Plan   -c/w Memantine   #Parkinson's Disease (G20); stable    -monitor increase in tremors   -monitor labs   -consult neurology as needed   -c/w Carbidopa-Levodopa  #HTN; BP fairly controlled   - continue Hydralazine as needed, Lasix daily and Potassium supplement while on diuretic    #Chronic Pain Management   -c/w Tylenol  #BPH  - c/w Flomax    #Constipation   -c/w Senna, Miralax  #Physical debility  - keep safe and fall precaution  #Code status; full code   #Med reviewed  #Lab examined   Problem List Items Addressed This Visit       Advanced COPD (Multi)    Dementia due to Parkinson's disease, without behavioral disturbance, psychotic disturbance, mood disturbance, or anxiety (Multi)    Parkinson disease (Multi)    Primary hypertension - Primary    Benign prostatic hyperplasia    Physical debility    Late onset Alzheimer's disease with behavioral disturbance (Multi)      Time spending 35 minutes including discussion about goal of care.  -Reviewed VS, BS, O2, etc as per protocol. Reviewed and signed off orders, medications, Labs, x-rays, and current diagnoses in PCC. Obtained history via interviewed nursing staff.   -Performing physical examination  -Patient's family updated and code status discussed. He remains full code.   -Ordering medications, lab   [facility]  -Care coordinating with nursing staff      Electronically Signed By: Stephen Buckner MD   4/29/24 10:47 PM

## 2024-04-30 NOTE — PROGRESS NOTES
Subjective   Patient ID: Flakito Mcfarlane is a 84 y.o. male who is long term resident being seen and evaluated for multiple medical problems and monthly visit.    HPI   A 82-year-old male who is a long term resident of [facility]. He has a medical hx of Parkinson's disease, HTN, Dementia/Alzheimer, BPH, covid (1/18/23) and physical debility. Resident is poor historian but able to answer yes and no questions. Per discussion with nursing staff, resident tolerate PO diet, no complaint of pain during this time. He denies chest pain, SOB, afebrile, N/V, constipation per staff reported.     Review of Systems  Ros LIMITED DUE TO DEMENTIA   Objective   /72  RR 16 P 88     Physical Exam  Constitutional: awake, afebrile, not in acute distress  Eyes: clear sclera  ENMT: mucous membranes moist  Head/Neck: neck supple, trachea midline, no JVD  Respiratory/Thorax: CTAB with non labored breathing   Cardiovascular: Regular rate   Gastrointestinal: soft, nt/nd/ng/nr  Musculoskeletal: Move all extremities   Extremities: no edema, no cellulitis  Neurological: A&O x1, able to answer simple questions   Psychological: Appropriate mood and behavior  Skin: warm and dry, intact    Problem List Items Addressed This Visit             ICD-10-CM    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    Dementia due to Parkinson's disease, without behavioral disturbance, psychotic disturbance, mood disturbance, or anxiety (Multi) G20.A1, F02.80    Parkinson disease (Multi) G20.A1    Primary hypertension - Primary I10    Benign prostatic hyperplasia N40.0    Physical debility R53.81    Late onset Alzheimer's disease with behavioral disturbance (Multi) G30.1, F02.818       Assessment/Plan   #Advanced COPD; controlled, Patient denies SOB, PAULINO   -c/w Budesonide and albuterol as needed   #Dementia without behavioral disturbance   -Behavior monitoring for the following (1) mood changes(2) depressed mood (3) anxiety/agitation (4) hallucinations/delusions    -F/U with Psych   -c/w Medication treatment Plan   -c/w Memantine   #Parkinson's Disease (G20); stable    -monitor increase in tremors   -monitor labs   -consult neurology as needed   -c/w Carbidopa-Levodopa  #HTN; BP fairly controlled   - continue Hydralazine as needed, Lasix daily and Potassium supplement while on diuretic    #Chronic Pain Management   -c/w Tylenol  #BPH  - c/w Flomax    #Constipation   -c/w Senna, Miralax  #Physical debility  - keep safe and fall precaution  #Code status; full code   #Med reviewed  #Lab examined   Problem List Items Addressed This Visit       Advanced COPD (Multi)    Dementia due to Parkinson's disease, without behavioral disturbance, psychotic disturbance, mood disturbance, or anxiety (Multi)    Parkinson disease (Multi)    Primary hypertension - Primary    Benign prostatic hyperplasia    Physical debility    Late onset Alzheimer's disease with behavioral disturbance (Multi)      Time spending 35 minutes including discussion about goal of care.  -Reviewed VS, BS, O2, etc as per protocol. Reviewed and signed off orders, medications, Labs, x-rays, and current diagnoses in PCC. Obtained history via interviewed nursing staff.   -Performing physical examination  -Patient's family updated and code status discussed. He remains full code.   -Ordering medications, lab   -Documenting clinical information in the [facility]  -Care coordinating with nursing staff

## 2024-05-27 ENCOUNTER — NURSING HOME VISIT (OUTPATIENT)
Dept: POST ACUTE CARE | Facility: EXTERNAL LOCATION | Age: 84
End: 2024-05-27
Payer: COMMERCIAL

## 2024-05-27 DIAGNOSIS — G30.1 LATE ONSET ALZHEIMER'S DISEASE WITH BEHAVIORAL DISTURBANCE (MULTI): ICD-10-CM
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
## 2024-05-27 DIAGNOSIS — F02.A0 MILD DEMENTIA DUE TO PARKINSON'S DISEASE, WITHOUT BEHAVIORAL DISTURBANCE, PSYCHOTIC DISTURBANCE, MOOD DISTURBANCE, OR ANXIETY (MULTI): Primary | ICD-10-CM

## 2024-05-27 DIAGNOSIS — K59.03 DRUG-INDUCED CONSTIPATION: ICD-10-CM

## 2024-05-27 DIAGNOSIS — J44.9 ADVANCED COPD (MULTI): ICD-10-CM

## 2024-05-27 DIAGNOSIS — R53.81 PHYSICAL DEBILITY: ICD-10-CM

## 2024-05-27 DIAGNOSIS — G20.A1 MILD DEMENTIA DUE TO PARKINSON'S DISEASE, WITHOUT BEHAVIORAL DISTURBANCE, PSYCHOTIC DISTURBANCE, MOOD DISTURBANCE, OR ANXIETY (MULTI): Primary | ICD-10-CM

## 2024-05-27 DIAGNOSIS — F02.818 LATE ONSET ALZHEIMER'S DISEASE WITH BEHAVIORAL DISTURBANCE (MULTI): ICD-10-CM

## 2024-05-27 DIAGNOSIS — I10 PRIMARY HYPERTENSION: ICD-10-CM

## 2024-05-27 DIAGNOSIS — G20.B2 PARKINSON'S DISEASE WITH DYSKINESIA AND FLUCTUATING MANIFESTATIONS (MULTI): ICD-10-CM
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
## 2024-05-27 PROCEDURE — 99309 SBSQ NF CARE MODERATE MDM 30: CPT | Performed by: FAMILY MEDICINE

## 2024-05-27 NOTE — LETTER
Patient: Flakito Mcfarlane  : 1940    Encounter Date: 2024    Subjective  Patient ID: Flakito Mcfarlane is a 84 y.o. male who is long term resident being seen and evaluated for multiple medical problems and monthly visit.    HPI   A 82-year-old male who is a long term resident of [facility] ([place], [place] He has a medical hx of Parkinson's disease, HTN, Dementia/Alzheimer, BPH, covid (23) and physical debility. Resident is poor historian but able to answer yes and no questions. Per discussion with nursing staff, resident tolerate PO diet, no complaint of pain during this time. He denies chest pain, SOB, afebrile, N/V, constipation per staff reported.   Patient fell on unit while ambulatiing today     Review of Systems  Ros LIMITED DUE TO DEMENTIA   Objective  /72  RR 16 P 88     Physical Exam  Constitutional: awake, afebrile, not in acute distress  Eyes: clear sclera  ENMT: mucous membranes moist  Head/Neck: neck supple, trachea midline, no JVD  Respiratory/Thorax: CTAB with non labored breathing   Cardiovascular: Regular rate   Gastrointestinal: soft, nt/nd/ng/nr  Musculoskeletal: Move all extremities   Extremities: no edema, no cellulitis  Neurological: A&O x1, able to answer simple questions   Psychological: Appropriate mood and behavior  Skin: warm and dry, intact    Problem List Items Addressed This Visit             ICD-10-CM    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    Dementia due to Parkinson's disease, without behavioral disturbance, psychotic disturbance, mood disturbance, or anxiety (Multi) - Primary G20.A1, F02.80    Parkinson disease (Multi) G20.A1    Primary hypertension I10    Constipation K59.00    Physical debility R53.81    Late onset Alzheimer's disease with behavioral disturbance (Multi) G30.1, F02.818    Fall W19.XXXA         Assessment/Plan  #Advanced COPD; controlled, Patient denies SOB, PAULINO   -c/w Budesonide and albuterol as needed   #Dementia without behavioral disturbance   -Behavior  monitoring for the following (1) mood changes(2) depressed mood (3) anxiety/agitation (4) hallucinations/delusions   -F/U with Psych   -c/w Medication treatment Plan   -c/w Memantine   #Parkinson's Disease (G20); stable    -monitor increase in tremors   -monitor labs   -consult neurology as needed   -c/w Carbidopa-Levodopa  #HTN; BP fairly controlled   - continue Hydralazine as needed, Lasix daily and Potassium supplement while on diuretic    #Chronic Pain Management   -c/w Tylenol  #BPH  - c/w Flomax    #Constipation   -c/w Senna, Miralax  #Physical debility  - keep safe and fall precaution  #Code status; full code   #Med reviewed  #Lab examined   Problem List Items Addressed This Visit       Advanced COPD (Multi)    Dementia due to Parkinson's disease, without behavioral disturbance, psychotic disturbance, mood disturbance, or anxiety (Multi) - Primary    Parkinson disease (Multi)    Primary hypertension    Constipation    Physical debility    Late onset Alzheimer's disease with behavioral disturbance (Multi)    Fall        Time spending 35 minutes including discussion about goal of care.  -Reviewed VS, BS, O2, etc as per protocol. Reviewed and signed off orders, medications, Labs, x-rays, and current diagnoses in PCC. Obtained history via interviewed nursing staff.   -Performing physical examination  -Patient's family updated and code status discussed. He remains full code.   -Ordering medications, lab   -Documenting clinical information in the [facility]  -Care coordinating with nursing staff      Electronically Signed By: Stephen Bcukner MD   5/27/24  1:17 PM

## 2024-05-27 NOTE — PROGRESS NOTES
Subjective   Patient ID: Flakito Mcfarlane is a 84 y.o. male who is long term resident being seen and evaluated for multiple medical problems and monthly visit.    HPI   A 82-year-old male who is a long term resident of [facility] at [facility]. He has a medical hx of Parkinson's disease, HTN, Dementia/Alzheimer, BPH, covid (1/18/23) and physical debility. Resident is poor historian but able to answer yes and no questions. Per discussion with nursing staff, resident tolerate PO diet, no complaint of pain during this time. He denies chest pain, SOB, afebrile, N/V, constipation per staff reported.   Patient fell on unit while ambulatiing today     Review of Systems  Ros LIMITED DUE TO DEMENTIA   Objective   /72  RR 16 P 88     Physical Exam  Constitutional: awake, afebrile, not in acute distress  Eyes: clear sclera  ENMT: mucous membranes moist  Head/Neck: neck supple, trachea midline, no JVD  Respiratory/Thorax: CTAB with non labored breathing   Cardiovascular: Regular rate   Gastrointestinal: soft, nt/nd/ng/nr  Musculoskeletal: Move all extremities   Extremities: no edema, no cellulitis  Neurological: A&O x1, able to answer simple questions   Psychological: Appropriate mood and behavior  Skin: warm and dry, intact    Problem List Items Addressed This Visit             ICD-10-CM    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    Dementia due to Parkinson's disease, without behavioral disturbance, psychotic disturbance, mood disturbance, or anxiety (Multi) - Primary G20.A1, F02.80    Parkinson disease (Multi) G20.A1    Primary hypertension I10    Constipation K59.00    Physical debility R53.81    Late onset Alzheimer's disease with behavioral disturbance (Multi) G30.1, F02.818    Fall W19.XXXA         Assessment/Plan   #Advanced COPD; controlled, Patient denies SOB, PAULINO   -c/w Budesonide and albuterol as needed   #Dementia without behavioral disturbance   -Behavior monitoring for the following (1) mood changes(2) depressed mood (3)  anxiety/agitation (4) hallucinations/delusions   -F/U with Psych   -c/w Medication treatment Plan   -c/w Memantine   #Parkinson's Disease (G20); stable    -monitor increase in tremors   -monitor labs   -consult neurology as needed   -c/w Carbidopa-Levodopa  #HTN; BP fairly controlled   - continue Hydralazine as needed, Lasix daily and Potassium supplement while on diuretic    #Chronic Pain Management   -c/w Tylenol  #BPH  - c/w Flomax    #Constipation   -c/w Senna, Miralax  #Physical debility  - keep safe and fall precaution  #Code status; full code   #Med reviewed  #Lab examined   Problem List Items Addressed This Visit       Advanced COPD (Multi)    Dementia due to Parkinson's disease, without behavioral disturbance, psychotic disturbance, mood disturbance, or anxiety (Multi) - Primary    Parkinson disease (Multi)    Primary hypertension    Constipation    Physical debility    Late onset Alzheimer's disease with behavioral disturbance (Multi)    Fall        Time spending 35 minutes including discussion about goal of care.  -Reviewed VS, BS, O2, etc as per protocol. Reviewed and signed off orders, medications, Labs, x-rays, and current diagnoses in PCC. Obtained history via interviewed nursing staff.   -Performing physical examination  -Patient's family updated and code status discussed. He remains full code.   -Ordering medications, lab   -Documenting clinical information in the [facility]  -Care coordinating with nursing staff

## 2024-06-21 ENCOUNTER — NURSING HOME VISIT (OUTPATIENT)
Dept: POST ACUTE CARE | Facility: EXTERNAL LOCATION | Age: 84
End: 2024-06-21
Payer: COMMERCIAL

## 2024-06-21 DIAGNOSIS — F02.A0 MILD DEMENTIA DUE TO PARKINSON'S DISEASE, WITHOUT BEHAVIORAL DISTURBANCE, PSYCHOTIC DISTURBANCE, MOOD DISTURBANCE, OR ANXIETY (MULTI): ICD-10-CM

## 2024-06-21 DIAGNOSIS — R35.0 BENIGN PROSTATIC HYPERPLASIA WITH URINARY FREQUENCY: ICD-10-CM

## 2024-06-21 DIAGNOSIS — J44.9 ADVANCED COPD (MULTI): ICD-10-CM

## 2024-06-21 DIAGNOSIS — K59.03 DRUG-INDUCED CONSTIPATION: ICD-10-CM

## 2024-06-21 DIAGNOSIS — G30.1 LATE ONSET ALZHEIMER'S DISEASE WITH BEHAVIORAL DISTURBANCE (MULTI): ICD-10-CM

## 2024-06-21 DIAGNOSIS — F02.818 LATE ONSET ALZHEIMER'S DISEASE WITH BEHAVIORAL DISTURBANCE (MULTI): ICD-10-CM

## 2024-06-21 DIAGNOSIS — G20.B2 PARKINSON'S DISEASE WITH DYSKINESIA AND FLUCTUATING MANIFESTATIONS (MULTI): Primary | ICD-10-CM

## 2024-06-21 DIAGNOSIS — I10 PRIMARY HYPERTENSION: ICD-10-CM

## 2024-06-21 DIAGNOSIS — G20.A1 MILD DEMENTIA DUE TO PARKINSON'S DISEASE, WITHOUT BEHAVIORAL DISTURBANCE, PSYCHOTIC DISTURBANCE, MOOD DISTURBANCE, OR ANXIETY (MULTI): ICD-10-CM

## 2024-06-21 DIAGNOSIS — R53.81 PHYSICAL DEBILITY: ICD-10-CM

## 2024-06-21 DIAGNOSIS — N40.1 BENIGN PROSTATIC HYPERPLASIA WITH URINARY FREQUENCY: ICD-10-CM

## 2024-06-21 PROCEDURE — 99309 SBSQ NF CARE MODERATE MDM 30: CPT | Performed by: FAMILY MEDICINE

## 2024-06-21 NOTE — LETTER
Patient: Flakito Mcfarlane  : 1940    Encounter Date: 2024    Subjective  Patient ID: Flakito Mcfarlane is a 84 y.o. male who is long term resident being seen and evaluated for multiple medical problems and monthly visit.    HPI   A 82-year-old male who is a long term resident of [facility] at [facility]. He has a medical hx of Parkinson's disease, HTN, Dementia/Alzheimer, BPH, covid (23) and physical debility. Resident is poor historian but able to answer yes and no questions. Per discussion with nursing staff, resident tolerate PO diet, no complaint of pain during this time. He denies chest pain, SOB, afebrile, N/V, constipation per staff reported.   Patient complained f lower abd pain.   Review of Systems  Ros LIMITED DUE TO DEMENTIA   Objective  /72  RR 16 P 88     Physical Exam  Constitutional: awake, afebrile, not in acute distress  Eyes: clear sclera  ENMT: mucous membranes moist  Head/Neck: neck supple, trachea midline, no JVD  Respiratory/Thorax: CTAB with non labored breathing   Cardiovascular: Regular rate   Gastrointestinal: soft, nt/nd/ng/nr  Musculoskeletal: Move all extremities   Extremities: no edema, no cellulitis  Neurological: A&O x1, able to answer simple questions   Psychological: Appropriate mood and behavior  Skin: warm and dry, intact    Problem List Items Addressed This Visit             ICD-10-CM    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    Dementia due to Parkinson's disease, without behavioral disturbance, psychotic disturbance, mood disturbance, or anxiety (Multi) G20.A1, F02.80    Parkinson disease (Multi) - Primary G20.A1    Primary hypertension I10    Benign prostatic hyperplasia N40.0    Constipation K59.00    Physical debility R53.81    Late onset Alzheimer's disease with behavioral disturbance (Multi) G30.1, F02.818           Assessment/Plan  Abdominel apain will get KUB  #Advanced COPD; controlled, Patient denies SOB, PAULINO   -c/w Budesonide and albuterol as needed   #Dementia  without behavioral disturbance   -Behavior monitoring for the following (1) mood changes(2) depressed mood (3) anxiety/agitation (4) hallucinations/delusions   -F/U with Psych   -c/w Medication treatment Plan   -c/w Memantine   #Parkinson's Disease (G20); stable    -monitor increase in tremors   -monitor labs   -consult neurology as needed   -c/w Carbidopa-Levodopa  #HTN; BP fairly controlled   - continue Hydralazine as needed, Lasix daily and Potassium supplement while on diuretic    #Chronic Pain Management   -c/w Tylenol  #BPH  - c/w Flomax    #Constipation   -c/w Senna, Miralax  #Physical debility  - keep safe and fall precaution  #Code status; full code   #Med reviewed  #Lab examined   Problem List Items Addressed This Visit       Advanced COPD (Multi)    Dementia due to Parkinson's disease, without behavioral disturbance, psychotic disturbance, mood disturbance, or anxiety (Multi)    Parkinson disease (Multi) - Primary    Primary hypertension    Benign prostatic hyperplasia    Constipation    Physical debility    Late onset Alzheimer's disease with behavioral disturbance (Multi)          Time spending 35 minutes including discussion about goal of care.  -Reviewed VS, BS, O2, etc as per protocol. Reviewed and signed off orders, medications, Labs, x-rays, and current diagnoses in PCC. Obtained history via interviewed nursing staff.   -Performing physical examination  -Patient's family updated and code status discussed. He remains full code.   -Ordering medications, lab   -Documenting clinical information in the [facility]  -Care coordinating with nursing staff      Electronically Signed By: Stephen Buckner MD   7/7/24  2:23 PM

## 2024-06-26 ENCOUNTER — NURSING HOME VISIT (OUTPATIENT)
Dept: POST ACUTE CARE | Facility: EXTERNAL LOCATION | Age: 84
End: 2024-06-26
Payer: COMMERCIAL

## 2024-06-26 DIAGNOSIS — G20.B2 PARKINSON'S DISEASE WITH DYSKINESIA AND FLUCTUATING MANIFESTATIONS (MULTI): ICD-10-CM

## 2024-06-26 DIAGNOSIS — G20.A1 MILD DEMENTIA DUE TO PARKINSON'S DISEASE, WITHOUT BEHAVIORAL DISTURBANCE, PSYCHOTIC DISTURBANCE, MOOD DISTURBANCE, OR ANXIETY (MULTI): Primary | ICD-10-CM

## 2024-06-26 DIAGNOSIS — F02.A0 MILD DEMENTIA DUE TO PARKINSON'S DISEASE, WITHOUT BEHAVIORAL DISTURBANCE, PSYCHOTIC DISTURBANCE, MOOD DISTURBANCE, OR ANXIETY (MULTI): Primary | ICD-10-CM

## 2024-06-26 DIAGNOSIS — R35.0 BENIGN PROSTATIC HYPERPLASIA WITH URINARY FREQUENCY: ICD-10-CM

## 2024-06-26 DIAGNOSIS — K59.03 DRUG-INDUCED CONSTIPATION: ICD-10-CM

## 2024-06-26 DIAGNOSIS — N40.1 BENIGN PROSTATIC HYPERPLASIA WITH URINARY FREQUENCY: ICD-10-CM

## 2024-06-26 DIAGNOSIS — J44.9 ADVANCED COPD (MULTI): ICD-10-CM

## 2024-06-26 DIAGNOSIS — F02.818 LATE ONSET ALZHEIMER'S DISEASE WITH BEHAVIORAL DISTURBANCE (MULTI): ICD-10-CM

## 2024-06-26 DIAGNOSIS — I10 PRIMARY HYPERTENSION: ICD-10-CM

## 2024-06-26 DIAGNOSIS — G30.1 LATE ONSET ALZHEIMER'S DISEASE WITH BEHAVIORAL DISTURBANCE (MULTI): ICD-10-CM
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
## 2024-06-26 DIAGNOSIS — R53.81 PHYSICAL DEBILITY: ICD-10-CM

## 2024-06-26 PROCEDURE — 99309 SBSQ NF CARE MODERATE MDM 30: CPT | Performed by: FAMILY MEDICINE

## 2024-06-26 NOTE — LETTER
Patient: Flakito Mcfarlane  : 1940    Encounter Date: 2024    Subjective  Patient ID: Flakito Mcfarlane is a 84 y.o. male who is long term resident being seen and evaluated for multiple medical problems and monthly visit.    HPI   A 82-year-old male who is a long term resident of [facility]. He has a medical hx of Parkinson's disease, HTN, Dementia/Alzheimer, BPH, covid (23) and physical debility. Resident is poor historian but able to answer yes and no questions. Per discussion with nursing staff, resident tolerate PO diet, no complaint of pain during this time. He denies chest pain, SOB, afebrile, N/V, constipation per staff reported.   Patient complained f lower abd pain.   KUB done shoed Mild ileus. \  Started clear liquid diet for 24 hours and Ducolax suppository Symptoms imaproved  Review of Systems  Ros LIMITED DUE TO DEMENTIA   Objective  /72  RR 16 P 88     Physical Exam  Constitutional: awake, afebrile, not in acute distress  Eyes: clear sclera  ENMT: mucous membranes moist  Head/Neck: neck supple, trachea midline, no JVD  Respiratory/Thorax: CTAB with non labored breathing   Cardiovascular: Regular rate   Gastrointestinal: soft, nt/nd/ng/nr  Musculoskeletal: Move all extremities   Extremities: no edema, no cellulitis  Neurological: A&O x1, able to answer simple questions   Psychological: Appropriate mood and behavior  Skin: warm and dry, intact    Problem List Items Addressed This Visit             ICD-10-CM    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    Dementia due to Parkinson's disease, without behavioral disturbance, psychotic disturbance, mood disturbance, or anxiety (Multi) - Primary G20.A1, F02.80    Parkinson disease (Multi) G20.A1    Primary hypertension I10    Benign prostatic hyperplasia N40.0    Constipation K59.00    Physical debility R53.81    Late onset Alzheimer's disease with behavioral disturbance (Multi) G30.1, F02.818    Fall W19.XXXA              Assessment/Plan  Colonic Ileus with stool thruout the colon on KUB Ducolax suppository given with good results Symptoms resolved c/w bowel regiemn  #Advanced COPD; controlled, Patient denies SOB, PAULINO   -c/w Budesonide and albuterol as needed   #Dementia without behavioral disturbance   -Behavior monitoring for the following (1) mood changes(2) depressed mood (3) anxiety/agitation (4) hallucinations/delusions   -F/U with Psych   -c/w Medication treatment Plan   -c/w Memantine   #Parkinson's Disease (G20); stable    -monitor increase in tremors   -monitor labs   -consult neurology as needed   -c/w Carbidopa-Levodopa  #HTN; BP fairly controlled   - continue Hydralazine as needed, Lasix daily and Potassium supplement while on diuretic    #Chronic Pain Management   -c/w Tylenol  #BPH  - c/w Flomax    #Constipation   -c/w Senna, Miralax  #Physical debility  - keep safe and fall precaution  #Code status; full code   #Med reviewed  #Lab examined   Problem List Items Addressed This Visit       Advanced COPD (Multi)    Dementia due to Parkinson's disease, without behavioral disturbance, psychotic disturbance, mood disturbance, or anxiety (Multi) - Primary    Parkinson disease (Multi)    Primary hypertension    Benign prostatic hyperplasia    Constipation    Physical debility    Late onset Alzheimer's disease with behavioral disturbance (Multi)    Fall            Time spending 35 minutes including discussion about goal of care.  -Reviewed VS, BS, O2, etc as per protocol. Reviewed and signed off orders, medications, Labs, x-rays, and current diagnoses in PCC. Obtained history via interviewed nursing staff.   -Performing physical examination  -Patient's family updated and code status discussed. He remains full code.   -Ordering medications, lab   -Documenting clinical information in the [facility]  -Care coordinating with nursing staff      Electronically Signed By: Stephen Buckner MD   7/7/24  2:56 PM

## 2024-07-07 NOTE — PROGRESS NOTES
Subjective   Patient ID: Flakito Mcfarlane is a 84 y.o. male who is long term resident being seen and evaluated for multiple medical problems and monthly visit.    HPI   A 82-year-old male who is a long term resident of [facility]. He has a medical hx of Parkinson's disease, HTN, Dementia/Alzheimer, BPH, covid (1/18/23) and physical debility. Resident is poor historian but able to answer yes and no questions. Per discussion with nursing staff, resident tolerate PO diet, no complaint of pain during this time. He denies chest pain, SOB, afebrile, N/V, constipation per staff reported.   Patient complained f lower abd pain.   Review of Systems  Ros LIMITED DUE TO DEMENTIA   Objective   /72  RR 16 P 88     Physical Exam  Constitutional: awake, afebrile, not in acute distress  Eyes: clear sclera  ENMT: mucous membranes moist  Head/Neck: neck supple, trachea midline, no JVD  Respiratory/Thorax: CTAB with non labored breathing   Cardiovascular: Regular rate   Gastrointestinal: soft, nt/nd/ng/nr  Musculoskeletal: Move all extremities   Extremities: no edema, no cellulitis  Neurological: A&O x1, able to answer simple questions   Psychological: Appropriate mood and behavior  Skin: warm and dry, intact    Problem List Items Addressed This Visit             ICD-10-CM    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    Dementia due to Parkinson's disease, without behavioral disturbance, psychotic disturbance, mood disturbance, or anxiety (Multi) G20.A1, F02.80    Parkinson disease (Multi) - Primary G20.A1    Primary hypertension I10    Benign prostatic hyperplasia N40.0    Constipation K59.00    Physical debility R53.81    Late onset Alzheimer's disease with behavioral disturbance (Multi) G30.1, F02.818           Assessment/Plan   Abdominel apain will get KUB  #Advanced COPD; controlled, Patient denies SOB, PAULINO   -c/w Budesonide and albuterol as needed   #Dementia without behavioral disturbance   -Behavior monitoring for the  following (1) mood changes(2) depressed mood (3) anxiety/agitation (4) hallucinations/delusions   -F/U with Psych   -c/w Medication treatment Plan   -c/w Memantine   #Parkinson's Disease (G20); stable    -monitor increase in tremors   -monitor labs   -consult neurology as needed   -c/w Carbidopa-Levodopa  #HTN; BP fairly controlled   - continue Hydralazine as needed, Lasix daily and Potassium supplement while on diuretic    #Chronic Pain Management   -c/w Tylenol  #BPH  - c/w Flomax    #Constipation   -c/w Senna, Miralax  #Physical debility  - keep safe and fall precaution  #Code status; full code   #Med reviewed  #Lab examined   Problem List Items Addressed This Visit       Advanced COPD (Multi)    Dementia due to Parkinson's disease, without behavioral disturbance, psychotic disturbance, mood disturbance, or anxiety (Multi)    Parkinson disease (Multi) - Primary    Primary hypertension    Benign prostatic hyperplasia    Constipation    Physical debility    Late onset Alzheimer's disease with behavioral disturbance (Multi)          Time spending 35 minutes including discussion about goal of care.  -Reviewed VS, BS, O2, etc as per protocol. Reviewed and signed off orders, medications, Labs, x-rays, and current diagnoses in PCC. Obtained history via interviewed nursing staff.   -Performing physical examination  -Patient's family updated and code status discussed. He remains full code.   -Ordering medications, lab   -Documenting clinical information in the [facility]  -Care coordinating with nursing staff

## 2024-07-07 NOTE — PROGRESS NOTES
Subjective   Patient ID: Flakito Mcfarlane is a 84 y.o. male who is long term resident being seen and evaluated for multiple medical problems and monthly visit.    HPI   A 82-year-old male who is a long term resident of [facility]. He has a medical hx of Parkinson's disease, HTN, Dementia/Alzheimer, BPH, covid (1/18/23) and physical debility. Resident is poor historian but able to answer yes and no questions. Per discussion with nursing staff, resident tolerate PO diet, no complaint of pain during this time. He denies chest pain, SOB, afebrile, N/V, constipation per staff reported.   Patient complained f lower abd pain.   KUB done shoed Mild ileus. \  Started clear liquid diet for 24 hours and Ducolax suppository Symptoms imaproved  Review of Systems  Ros LIMITED DUE TO DEMENTIA   Objective   /72  RR 16 P 88     Physical Exam  Constitutional: awake, afebrile, not in acute distress  Eyes: clear sclera  ENMT: mucous membranes moist  Head/Neck: neck supple, trachea midline, no JVD  Respiratory/Thorax: CTAB with non labored breathing   Cardiovascular: Regular rate   Gastrointestinal: soft, nt/nd/ng/nr  Musculoskeletal: Move all extremities   Extremities: no edema, no cellulitis  Neurological: A&O x1, able to answer simple questions   Psychological: Appropriate mood and behavior  Skin: warm and dry, intact    Problem List Items Addressed This Visit             ICD-10-CM    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    Dementia due to Parkinson's disease, without behavioral disturbance, psychotic disturbance, mood disturbance, or anxiety (Multi) - Primary G20.A1, F02.80    Parkinson disease (Multi) G20.A1    Primary hypertension I10    Benign prostatic hyperplasia N40.0    Constipation K59.00    Physical debility R53.81    Late onset Alzheimer's disease with behavioral disturbance (Multi) G30.1, F02.818    Fall W19.XXXA             Assessment/Plan   Colonic Ileus with stool thruout the colon on KUB Ducolax suppository  given with good results Symptoms resolved c/w bowel regiemn  #Advanced COPD; controlled, Patient denies SOB, PAULINO   -c/w Budesonide and albuterol as needed   #Dementia without behavioral disturbance   -Behavior monitoring for the following (1) mood changes(2) depressed mood (3) anxiety/agitation (4) hallucinations/delusions   -F/U with Psych   -c/w Medication treatment Plan   -c/w Memantine   #Parkinson's Disease (G20); stable    -monitor increase in tremors   -monitor labs   -consult neurology as needed   -c/w Carbidopa-Levodopa  #HTN; BP fairly controlled   - continue Hydralazine as needed, Lasix daily and Potassium supplement while on diuretic    #Chronic Pain Management   -c/w Tylenol  #BPH  - c/w Flomax    #Constipation   -c/w Senna, Miralax  #Physical debility  - keep safe and fall precaution  #Code status; full code   #Med reviewed  #Lab examined   Problem List Items Addressed This Visit       Advanced COPD (Multi)    Dementia due to Parkinson's disease, without behavioral disturbance, psychotic disturbance, mood disturbance, or anxiety (Multi) - Primary    Parkinson disease (Multi)    Primary hypertension    Benign prostatic hyperplasia    Constipation    Physical debility    Late onset Alzheimer's disease with behavioral disturbance (Multi)    Fall            Time spending 35 minutes including discussion about goal of care.  -Reviewed VS, BS, O2, etc as per protocol. Reviewed and signed off orders, medications, Labs, x-rays, and current diagnoses in PCC. Obtained history via interviewed nursing staff.   -Performing physical examination  -Patient's family updated and code status discussed. He remains full code.   -Ordering medications, lab   -Documenting clinical information in the [facility]  -Care coordinating with nursing staff

## 2024-12-13 ENCOUNTER — NURSING HOME VISIT (OUTPATIENT)
Dept: POST ACUTE CARE | Facility: EXTERNAL LOCATION | Age: 84
End: 2024-12-13
Payer: COMMERCIAL

## 2024-12-13 DIAGNOSIS — N40.1 BENIGN PROSTATIC HYPERPLASIA WITH URINARY FREQUENCY: ICD-10-CM
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
## 2024-12-13 DIAGNOSIS — F02.A0 MILD DEMENTIA DUE TO PARKINSON'S DISEASE, WITHOUT BEHAVIORAL DISTURBANCE, PSYCHOTIC DISTURBANCE, MOOD DISTURBANCE, OR ANXIETY (MULTI): ICD-10-CM

## 2024-12-13 DIAGNOSIS — J44.9 ADVANCED COPD (MULTI): ICD-10-CM

## 2024-12-13 DIAGNOSIS — I10 PRIMARY HYPERTENSION: ICD-10-CM

## 2024-12-13 DIAGNOSIS — R53.81 PHYSICAL DEBILITY: ICD-10-CM

## 2024-12-13 DIAGNOSIS — K59.03 DRUG-INDUCED CONSTIPATION: ICD-10-CM

## 2024-12-13 DIAGNOSIS — G20.B2 PARKINSON'S DISEASE WITH DYSKINESIA AND FLUCTUATING MANIFESTATIONS: Primary | ICD-10-CM

## 2024-12-13 DIAGNOSIS — R35.0 BENIGN PROSTATIC HYPERPLASIA WITH URINARY FREQUENCY: ICD-10-CM

## 2024-12-13 DIAGNOSIS — G20.A1 MILD DEMENTIA DUE TO PARKINSON'S DISEASE, WITHOUT BEHAVIORAL DISTURBANCE, PSYCHOTIC DISTURBANCE, MOOD DISTURBANCE, OR ANXIETY (MULTI): ICD-10-CM

## 2024-12-13 DIAGNOSIS — G30.1 LATE ONSET ALZHEIMER'S DISEASE WITH BEHAVIORAL DISTURBANCE (MULTI): ICD-10-CM

## 2024-12-13 DIAGNOSIS — F02.818 LATE ONSET ALZHEIMER'S DISEASE WITH BEHAVIORAL DISTURBANCE (MULTI): ICD-10-CM

## 2024-12-13 PROCEDURE — 99309 SBSQ NF CARE MODERATE MDM 30: CPT | Performed by: FAMILY MEDICINE

## 2024-12-13 NOTE — LETTER
Patient: Flakito Mcfarlane  : 1940    Encounter Date: 2024    Subjective  Patient ID: Flakito Mcfarlane is a 84 y.o. male who is long term resident being seen and evaluated for multiple medical problems and monthly visit.    HPI   A 82-year-old male who is a long term resident of [facility]. He has a medical hx of Parkinson's disease, HTN, Dementia/Alzheimer, BPH, covid (23) and physical debility. Resident is poor historian but able to answer yes and no questions. Per discussion with nursing staff, resident tolerate PO diet, no complaint of pain during this time. He denies chest pain, SOB, afebrile, N/V, constipation per staff reported.   Patient complained f lower abd pain.   KUB done shoed Mild ileus. \  Started clear liquid diet for 24 hours and Ducolax suppository Symptoms imaproved  Review of Systems  Ros LIMITED DUE TO DEMENTIA   Objective  /72  RR 16 P 88     Physical Exam  Constitutional: awake, afebrile, not in acute distress  Eyes: clear sclera  ENMT: mucous membranes moist  Head/Neck: neck supple, trachea midline, no JVD  Respiratory/Thorax: CTAB with non labored breathing   Cardiovascular: Regular rate   Gastrointestinal: soft, nt/nd/ng/nr  Musculoskeletal: Move all extremities   Extremities: no edema, no cellulitis  Neurological: A&O x1, able to answer simple questions   Psychological: Appropriate mood and behavior  Skin: warm and dry, intact    Problem List Items Addressed This Visit             ICD-10-CM    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    Dementia due to Parkinson's disease, without behavioral disturbance, psychotic disturbance, mood disturbance, or anxiety (Multi) G20.A1, F02.80    Parkinson disease (Multi) - Primary G20.A1    Primary hypertension I10    Benign prostatic hyperplasia N40.0    Constipation K59.00    Physical debility R53.81    Late onset Alzheimer's disease with behavioral disturbance (Multi) G30.1, F02.818    Fall W19.XXXA                Assessment/Plan  Colonic Ileus with stool thruout the colon on KUB Ducolax suppository given with good results Symptoms resolved c/w bowel regiemn  #Advanced COPD; controlled, Patient denies SOB, PAULINO   -c/w Budesonide and albuterol as needed   #Dementia without behavioral disturbance   -Behavior monitoring for the following (1) mood changes(2) depressed mood (3) anxiety/agitation (4) hallucinations/delusions   -F/U with Psych   -c/w Medication treatment Plan   -c/w Memantine   #Parkinson's Disease (G20); stable    -monitor increase in tremors   -monitor labs   -consult neurology as needed   -c/w Carbidopa-Levodopa  #HTN; BP fairly controlled   - continue Hydralazine as needed, Lasix daily and Potassium supplement while on diuretic    #Chronic Pain Management   -c/w Tylenol  #BPH  - c/w Flomax    #Constipation   -c/w Senna, Miralax  #Physical debility  - keep safe and fall precaution  #Code status; full code   #Med reviewed  #Lab examined   Problem List Items Addressed This Visit       Advanced COPD (Multi)    Dementia due to Parkinson's disease, without behavioral disturbance, psychotic disturbance, mood disturbance, or anxiety (Multi)    Parkinson disease (Multi) - Primary    Primary hypertension    Benign prostatic hyperplasia    Constipation    Physical debility    Late onset Alzheimer's disease with behavioral disturbance (Multi)    Fall              Time spending 35 minutes including discussion about goal of care.  -Reviewed VS, BS, O2, etc as per protocol. Reviewed and signed off orders, medications, Labs, x-rays, and current diagnoses in PCC. Obtained history via interviewed nursing staff.   -Performing physical examination  -Patient's family updated and code status discussed. He remains full code.   -Ordering medications, lab   -Documenting clinical information in the [facility]  -Care coordinating with nursing staff      Electronically Signed By: Stephen Buckner MD   1/4/25  2:26 PM

## 2024-12-19 ENCOUNTER — APPOINTMENT (OUTPATIENT)
Dept: RADIOLOGY | Facility: HOSPITAL | Age: 84
End: 2024-12-19
Payer: COMMERCIAL

## 2024-12-19 ENCOUNTER — HOSPITAL ENCOUNTER (EMERGENCY)
Facility: HOSPITAL | Age: 84
Discharge: NURSING, REHAB OR LONG-TERM CARE | End: 2024-12-20
Attending: INTERNAL MEDICINE
Payer: COMMERCIAL
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
## 2024-12-19 LAB
ANION GAP SERPL CALC-SCNC: 11 MMOL/L (ref 10–20)
APPEARANCE UR: CLEAR
BASOPHILS # BLD AUTO: 0.01 X10*3/UL (ref 0–0.1)
BASOPHILS NFR BLD AUTO: 0.2 %
BILIRUB UR STRIP.AUTO-MCNC: NEGATIVE MG/DL
BUN SERPL-MCNC: 22 MG/DL (ref 6–23)
CALCIUM SERPL-MCNC: 8.6 MG/DL (ref 8.6–10.3)
CARDIAC TROPONIN I PNL SERPL HS: 4 NG/L (ref 0–20)
CARDIAC TROPONIN I PNL SERPL HS: 4 NG/L (ref 0–20)
CHLORIDE SERPL-SCNC: 110 MMOL/L (ref 98–107)
CO2 SERPL-SCNC: 31 MMOL/L (ref 21–32)
COLOR UR: ABNORMAL
CREAT SERPL-MCNC: 1.42 MG/DL (ref 0.5–1.3)
EGFRCR SERPLBLD CKD-EPI 2021: 48 ML/MIN/1.73M*2
EOSINOPHIL # BLD AUTO: 0.24 X10*3/UL (ref 0–0.4)
EOSINOPHIL NFR BLD AUTO: 5.1 %
ERYTHROCYTE [DISTWIDTH] IN BLOOD BY AUTOMATED COUNT: 14 % (ref 11.5–14.5)
GLUCOSE SERPL-MCNC: 107 MG/DL (ref 74–99)
GLUCOSE UR STRIP.AUTO-MCNC: NORMAL MG/DL
HCT VFR BLD AUTO: 37.8 % (ref 41–52)
HGB BLD-MCNC: 11.6 G/DL (ref 13.5–17.5)
IMM GRANULOCYTES # BLD AUTO: 0.01 X10*3/UL (ref 0–0.5)
IMM GRANULOCYTES NFR BLD AUTO: 0.2 % (ref 0–0.9)
KETONES UR STRIP.AUTO-MCNC: NEGATIVE MG/DL
LEUKOCYTE ESTERASE UR QL STRIP.AUTO: NEGATIVE
LYMPHOCYTES # BLD AUTO: 1.65 X10*3/UL (ref 0.8–3)
LYMPHOCYTES NFR BLD AUTO: 35.3 %
MCH RBC QN AUTO: 28.2 PG (ref 26–34)
MCHC RBC AUTO-ENTMCNC: 30.7 G/DL (ref 32–36)
MCV RBC AUTO: 92 FL (ref 80–100)
MONOCYTES # BLD AUTO: 0.55 X10*3/UL (ref 0.05–0.8)
MONOCYTES NFR BLD AUTO: 11.8 %
NEUTROPHILS # BLD AUTO: 2.22 X10*3/UL (ref 1.6–5.5)
NEUTROPHILS NFR BLD AUTO: 47.4 %
NITRITE UR QL STRIP.AUTO: NEGATIVE
NRBC BLD-RTO: 0 /100 WBCS (ref 0–0)
PH UR STRIP.AUTO: 5.5 [PH]
PLATELET # BLD AUTO: 176 X10*3/UL (ref 150–450)
POTASSIUM SERPL-SCNC: 4 MMOL/L (ref 3.5–5.3)
PROT UR STRIP.AUTO-MCNC: NEGATIVE MG/DL
RBC # BLD AUTO: 4.11 X10*6/UL (ref 4.5–5.9)
RBC # UR STRIP.AUTO: ABNORMAL /UL
RBC #/AREA URNS AUTO: >20 /HPF
SODIUM SERPL-SCNC: 148 MMOL/L (ref 136–145)
SP GR UR STRIP.AUTO: 1.02
UROBILINOGEN UR STRIP.AUTO-MCNC: NORMAL MG/DL
WBC # BLD AUTO: 4.7 X10*3/UL (ref 4.4–11.3)
WBC #/AREA URNS AUTO: ABNORMAL /HPF

## 2024-12-19 PROCEDURE — 99285 EMERGENCY DEPT VISIT HI MDM: CPT | Mod: 25 | Performed by: INTERNAL MEDICINE

## 2024-12-19 PROCEDURE — 2500000004 HC RX 250 GENERAL PHARMACY W/ HCPCS (ALT 636 FOR OP/ED): Performed by: INTERNAL MEDICINE

## 2024-12-19 PROCEDURE — 36415 COLL VENOUS BLD VENIPUNCTURE: CPT | Performed by: INTERNAL MEDICINE

## 2024-12-19 PROCEDURE — 81001 URINALYSIS AUTO W/SCOPE: CPT | Performed by: INTERNAL MEDICINE

## 2024-12-19 PROCEDURE — 72125 CT NECK SPINE W/O DYE: CPT | Performed by: RADIOLOGY

## 2024-12-19 PROCEDURE — 70450 CT HEAD/BRAIN W/O DYE: CPT | Performed by: RADIOLOGY

## 2024-12-19 PROCEDURE — 84484 ASSAY OF TROPONIN QUANT: CPT | Performed by: INTERNAL MEDICINE

## 2024-12-19 PROCEDURE — 80048 BASIC METABOLIC PNL TOTAL CA: CPT | Performed by: INTERNAL MEDICINE

## 2024-12-19 PROCEDURE — 72170 X-RAY EXAM OF PELVIS: CPT | Mod: FOREIGN READ | Performed by: RADIOLOGY

## 2024-12-19 PROCEDURE — 85025 COMPLETE CBC W/AUTO DIFF WBC: CPT | Performed by: INTERNAL MEDICINE

## 2024-12-19 PROCEDURE — 72125 CT NECK SPINE W/O DYE: CPT

## 2024-12-19 PROCEDURE — 71045 X-RAY EXAM CHEST 1 VIEW: CPT

## 2024-12-19 PROCEDURE — 70450 CT HEAD/BRAIN W/O DYE: CPT

## 2024-12-19 PROCEDURE — 96360 HYDRATION IV INFUSION INIT: CPT

## 2024-12-19 PROCEDURE — 72170 X-RAY EXAM OF PELVIS: CPT

## 2024-12-19 PROCEDURE — 71045 X-RAY EXAM CHEST 1 VIEW: CPT | Mod: FOREIGN READ | Performed by: RADIOLOGY

## 2024-12-19 ASSESSMENT — COLUMBIA-SUICIDE SEVERITY RATING SCALE - C-SSRS
2. HAVE YOU ACTUALLY HAD ANY THOUGHTS OF KILLING YOURSELF?: NO
1. IN THE PAST MONTH, HAVE YOU WISHED YOU WERE DEAD OR WISHED YOU COULD GO TO SLEEP AND NOT WAKE UP?: NO
6. HAVE YOU EVER DONE ANYTHING, STARTED TO DO ANYTHING, OR PREPARED TO DO ANYTHING TO END YOUR LIFE?: NO

## 2024-12-19 ASSESSMENT — PAIN - FUNCTIONAL ASSESSMENT: PAIN_FUNCTIONAL_ASSESSMENT: 0-10

## 2024-12-19 ASSESSMENT — PAIN SCALES - GENERAL
PAINLEVEL_OUTOF10: 0 - NO PAIN
PAINLEVEL_OUTOF10: 0 - NO PAIN

## 2024-12-19 NOTE — ED TRIAGE NOTES
To ed from nursing home for eval after falling from standing position. Per ems no notable contusions and per Nh appeared to loose his footing. Pt alert x 1 and denies current pain.

## 2024-12-19 NOTE — ED PROVIDER NOTES
"HPI     CC: Fall     HPI: Flakito Mcfarlane is a 84 y.o. male with a history of Parkinson's, HTN, dementia/Alzheimer's, BPH, prior COVID, physical debility, presents with reported fall with head strike.  According to RN who took report from EMS, patient was getting up from his wheelchair and seemed to lose his footing, fell backwards hitting his head.  No known LOC.  Patient himself is unable to provide a history, is only oriented to self, thinks he is at the doctor's office.  When asked why he is here he states \"I got nervous.\"  He cannot recall hitting his head.  He denies any pain or other physical complaints.    ROS: 10-point review of systems was performed and is otherwise negative except as noted in HPI.    Limitations to history: Dementia    Independent Historians: RN/EMS report    External Records Reviewed: Outpatient notes in EMR    Past Medical History: Noncontributory except per HPI     Past Surgical History: Noncontributory except per HPI     Family History: Reviewed and noncontributory     Social History: No known toxic habits    Social Determinants Affecting Care: N/A    No Known Allergies    Home Meds: No current outpatient medications     Physical Exam     ED Triage Vitals [12/19/24 1718]   Temperature Heart Rate Respirations BP   36.6 °C (97.9 °F) 70 16 138/71      Pulse Ox Temp src Heart Rate Source Patient Position   100 % -- -- --      BP Location FiO2 (%)     -- --         Heart Rate:  [67-70]   Temperature:  [36.6 °C (97.9 °F)]   Respirations:  [15-16]   BP: (138-173)/(71-93)   Pulse Ox:  [96 %-100 %]      Physical Exam  Vitals and nursing note reviewed.     CONSTITUTIONAL: Well appearing, well nourished, elderly male in no acute distress.   HENMT: Head atraumatic. No facial or scalp TTP. Airway patent. Nasal mucosa clear. Mouth with normal mucosa, clear oropharynx. Uvula midline. TMs clear bilaterally with no hemotympanum. Neck supple.    EYES: Clear bilaterally. No periorbital TTP.  Pupils equally " round and reactive to light, extraocular movements grossly intact.    CARDIOVASCULAR: Normal rate, regular rhythm.  Heart sounds S1, S2.  No murmurs, rubs or gallops. Normal pulses. Capillary refill <2 sec.   RESPIRATORY: No increased work of breathing. Breath sounds clear and equal bilaterally.  GASTROINTESTINAL: Abdomen soft, non-distended, non-tender. No rebound, no guarding. Bowel sounds normal in all 4 quadrants. No palpable masses.   GENITOURINARY:  No CVA tenderness.  MUSCULOSKELETAL: No midline C/T/L TTP or stepoffs. No other muscle or joint deformity or TTP. No edema.  NEUROLOGICAL: GCS 14.  Tremulous.  Alert and oriented only to self, no asymmetry, moving all extremities equally.  SKIN: Warm, dry and intact. No rash. No berrios sign, raccoon eyes or other notable skin lesions except as noted above.   PSYCHIATRIC: Normal mood and affect.  HEME/LYMPH: No adenopathy or splenomegaly.    Diagnostic Results      ECG: ECGs read and interpreted by me. See ED Course, below, for interpretation.    Labs Reviewed   CBC WITH AUTO DIFFERENTIAL - Abnormal       Result Value    WBC 4.7      nRBC 0.0      RBC 4.11 (*)     Hemoglobin 11.6 (*)     Hematocrit 37.8 (*)     MCV 92      MCH 28.2      MCHC 30.7 (*)     RDW 14.0      Platelets 176      Neutrophils % 47.4      Immature Granulocytes %, Automated 0.2      Lymphocytes % 35.3      Monocytes % 11.8      Eosinophils % 5.1      Basophils % 0.2      Neutrophils Absolute 2.22      Immature Granulocytes Absolute, Automated 0.01      Lymphocytes Absolute 1.65      Monocytes Absolute 0.55      Eosinophils Absolute 0.24      Basophils Absolute 0.01     BASIC METABOLIC PANEL - Abnormal    Glucose 107 (*)     Sodium 148 (*)     Potassium 4.0      Chloride 110 (*)     Bicarbonate 31      Anion Gap 11      Urea Nitrogen 22      Creatinine 1.42 (*)     eGFR 48 (*)     Calcium 8.6     URINALYSIS WITH REFLEX CULTURE AND MICROSCOPIC - Abnormal    Color, Urine Light-Yellow       Appearance, Urine Clear      Specific Gravity, Urine 1.017      pH, Urine 5.5      Protein, Urine NEGATIVE      Glucose, Urine Normal      Blood, Urine 0.06 (1+) (*)     Ketones, Urine NEGATIVE      Bilirubin, Urine NEGATIVE      Urobilinogen, Urine Normal      Nitrite, Urine NEGATIVE      Leukocyte Esterase, Urine NEGATIVE     URINALYSIS MICROSCOPIC WITH REFLEX CULTURE - Abnormal    WBC, Urine NONE      RBC, Urine >20 (*)    SERIAL TROPONIN-INITIAL - Normal    Troponin I, High Sensitivity 4      Narrative:     Less than 99th percentile of normal range cutoff-  Female and children under 18 years old <14 ng/L; Male <21 ng/L: Negative  Repeat testing should be performed if clinically indicated.     Female and children under 18 years old 14-50 ng/L; Male 21-50 ng/L:  Consistent with possible cardiac damage and possible increased clinical   risk. Serial measurements may help to assess extent of myocardial damage.     >50 ng/L: Consistent with cardiac damage, increased clinical risk and  myocardial infarction. Serial measurements may help assess extent of   myocardial damage.      NOTE: Children less than 1 year old may have higher baseline troponin   levels and results should be interpreted in conjunction with the overall   clinical context.     NOTE: Troponin I testing is performed using a different   testing methodology at [facility] than at other   [facility]. Direct result comparisons should only   be made within the same method.   SERIAL TROPONIN, 1 HOUR - Normal    Troponin I, High Sensitivity 4      Narrative:     Less than 99th percentile of normal range cutoff-  Female and children under 18 years old <14 ng/L; Male <21 ng/L: Negative  Repeat testing should be performed if clinically indicated.     Female and children under 18 years old 14-50 ng/L; Male 21-50 ng/L:  Consistent with possible cardiac damage and possible increased clinical   risk. Serial measurements may help to assess extent of  myocardial damage.     >50 ng/L: Consistent with cardiac damage, increased clinical risk and  myocardial infarction. Serial measurements may help assess extent of   myocardial damage.      NOTE: Children less than 1 year old may have higher baseline troponin   levels and results should be interpreted in conjunction with the overall   clinical context.     NOTE: Troponin I testing is performed using a different   testing methodology at [facility] than at other   [facility]. Direct result comparisons should only   be made within the same method.   TROPONIN SERIES- (INITIAL, 1 HR)    Narrative:     The following orders were created for panel order Troponin I Series, High Sensitivity (0, 1 HR).  Procedure                               Abnormality         Status                     ---------                               -----------         ------                     Troponin I, High Sensiti...[[id number]]  Normal              Final result               Troponin, High Sensitivi...[[id number]]  Normal              Final result                 Please view results for these tests on the individual orders.   URINALYSIS WITH REFLEX CULTURE AND MICROSCOPIC    Narrative:     The following orders were created for panel order Urinalysis with Reflex Culture and Microscopic.  Procedure                               Abnormality         Status                     ---------                               -----------         ------                     Urinalysis with Reflex C...[[id number]]  Abnormal            Final result               Extra Urine Gray Tube[[id number]]                                                         Please view results for these tests on the individual orders.   EXTRA URINE GRAY TUBE         CT head wo IV contrast   Final Result   CT HEAD:   1. No acute intracranial abnormality or calvarial fracture.             CT CERVICAL SPINE:   1. No acute fracture or traumatic malalignment of the cervical  spine.        MACRO:   None        Signed by: David Sweeney 12/19/2024 7:47 PM   Dictation workstation:   [id number]      CT cervical spine wo IV contrast   Final Result   CT HEAD:   1. No acute intracranial abnormality or calvarial fracture.             CT CERVICAL SPINE:   1. No acute fracture or traumatic malalignment of the cervical spine.        MACRO:   None        Signed by: David Sweeney 12/19/2024 7:47 PM   Dictation workstation:   [id number]      XR chest 1 view   Final Result   No acute cardiopulmonary disease.     Signed by Ariel Turner MD      XR pelvis 1-2 views   Final Result   No acute fracture or dislocation..   Signed by Ariel Turner MD                    Perri Coma Scale Score: 14                  Procedure  Procedures    ED Course & MDM   Assessment/Plan:   Flakito Mcfarlane is a 84 y.o. male with a history of Parkinson's, HTN, dementia/Alzheimer's, BPH, prior COVID, physical debility, presents with reported fall with head strike.  Per report this sounds like an mechanical fall but given a lack of history will obtain a limited cardiac, infectious, and metabolic workup.  He has no apparent injury on exam and is without complaints.  He is notably tremulous, suspect due to his underlying Parkinson's.  He is only oriented to self but I suspect this is also his baseline.  Workup was initiated with ECG, labs, chest and pelvis plain films, and CTs of the head and cervical spine.   to reach out to [facility] for more collateral.  See below for details of ED course and ultimate disposition.    Medications   sodium chloride 0.9 % bolus 1,000 mL (0 mL intravenous Stopped 12/19/24 1955)        ED Course as of 12/19/24 2154   Thu Dec 19, 2024   1819 ECG read and interpreted by me.  Normal sinus rhythm, rate 66.  Normal axis.  Normal intervals.  Anterior infarct seen previously.  No significant ST or T wave derangements. [CG]   1822 Labs are notable for CBC without leukocytosis 4.7, mild anemia  11.6, normal platelets, BMP with stably elevated sodium 148, elevated creatinine 1.42, normal troponin. He was given a fluid bolus for his presumed volume depletion.  [CG]   1929 Second troponin negative [CG]   2009 CTH/CS negative [CG]   2009 XR chest/pelvis negative [CG]   2009 Still unable to reach someone at [facility] for collateral.  [CG]   2146 Urinalysis not suggestive of UTI.  New  attempted to reach someone at [facility] and again was unsuccessful.  Patient was discharged home with anticipatory guidance and strict return precautions. [CG]      ED Course User Index  [CG] Sona Arias MD         Diagnoses as of 12/19/24 2154   Fall, initial encounter       Disposition:   DISCHARGE.  The patient was discharged with both verbal and written anticipatory guidance and strict return precautions. Discharge diagnosis, instructions and plan were discussed and understood. I emphasized the importance of following up with their primary care provider within 24-48 hours and with any referrals in the timeframe recommended. At the time of discharge the patient was comfortable and was in no apparent distress. Patient is aware of diagnostic uncertainty and was notified though testing is negative here, there is a very small chance that pathology may be missed.  The patient understands these risks and the patient/family understood to call 911 or return immediately to the emergency department if the symptoms worsen or if they have any additional concerns.      FOLLOW UP  Primary care provider in 1-2 days.      ED Prescriptions    None         Sona Arias MD  EM/IM/Peds    This note was dictated by speech recognition. Minor errors in transcription may be present.     Sona Arias MD  12/19/24 2154

## 2024-12-20 VITALS
OXYGEN SATURATION: 96 % | DIASTOLIC BLOOD PRESSURE: 77 MMHG | RESPIRATION RATE: 16 BRPM | HEART RATE: 60 BPM | TEMPERATURE: 97.9 F | SYSTOLIC BLOOD PRESSURE: 163 MMHG

## 2025-01-04 NOTE — PROGRESS NOTES
Subjective   Patient ID: Flakito Mcfarlane is a 84 y.o. male who is long term resident being seen and evaluated for multiple medical problems and monthly visit.    HPI   A 82-year-old male who is a long term resident of [facility] [place]. He has a medical hx of Parkinson's disease, HTN, Dementia/Alzheimer, BPH, covid (1/18/23) and physical debility. Resident is poor historian but able to answer yes and no questions. Per discussion with nursing staff, resident tolerate PO diet, no complaint of pain during this time. He denies chest pain, SOB, afebrile, N/V, constipation per staff reported.   Patient complained f lower abd pain.   KUB done shoed Mild ileus. \  Started clear liquid diet for 24 hours and Ducolax suppository Symptoms imaproved  Review of Systems  Ros LIMITED DUE TO DEMENTIA   Objective   /72  RR 16 P 88     Physical Exam  Constitutional: awake, afebrile, not in acute distress  Eyes: clear sclera  ENMT: mucous membranes moist  Head/Neck: neck supple, trachea midline, no JVD  Respiratory/Thorax: CTAB with non labored breathing   Cardiovascular: Regular rate   Gastrointestinal: soft, nt/nd/ng/nr  Musculoskeletal: Move all extremities   Extremities: no edema, no cellulitis  Neurological: A&O x1, able to answer simple questions   Psychological: Appropriate mood and behavior  Skin: warm and dry, intact    Problem List Items Addressed This Visit             ICD-10-CM    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    Dementia due to Parkinson's disease, without behavioral disturbance, psychotic disturbance, mood disturbance, or anxiety (Multi) G20.A1, F02.80    Parkinson disease (Multi) - Primary G20.A1    Primary hypertension I10    Benign prostatic hyperplasia N40.0    Constipation K59.00    Physical debility R53.81    Late onset Alzheimer's disease with behavioral disturbance (Multi) G30.1, F02.818    Fall W19.XXXA               Assessment/Plan   Colonic Ileus with stool thruout the colon on KUB Ducolax suppository  given with good results Symptoms resolved c/w bowel regiemn  #Advanced COPD; controlled, Patient denies SOB, PAULINO   -c/w Budesonide and albuterol as needed   #Dementia without behavioral disturbance   -Behavior monitoring for the following (1) mood changes(2) depressed mood (3) anxiety/agitation (4) hallucinations/delusions   -F/U with Psych   -c/w Medication treatment Plan   -c/w Memantine   #Parkinson's Disease (G20); stable    -monitor increase in tremors   -monitor labs   -consult neurology as needed   -c/w Carbidopa-Levodopa  #HTN; BP fairly controlled   - continue Hydralazine as needed, Lasix daily and Potassium supplement while on diuretic    #Chronic Pain Management   -c/w Tylenol  #BPH  - c/w Flomax    #Constipation   -c/w Senna, Miralax  #Physical debility  - keep safe and fall precaution  #Code status; full code   #Med reviewed  #Lab examined   Problem List Items Addressed This Visit       Advanced COPD (Multi)    Dementia due to Parkinson's disease, without behavioral disturbance, psychotic disturbance, mood disturbance, or anxiety (Multi)    Parkinson disease (Multi) - Primary    Primary hypertension    Benign prostatic hyperplasia    Constipation    Physical debility    Late onset Alzheimer's disease with behavioral disturbance (Multi)    Fall              Time spending 35 minutes including discussion about goal of care.  -Reviewed VS, BS, O2, etc as per protocol. Reviewed and signed off orders, medications, Labs, x-rays, and current diagnoses in PCC. Obtained history via interviewed nursing staff.   -Performing physical examination  -Patient's family updated and code status discussed. He remains full code.   -Ordering medications, lab   -Documenting clinical information in the [facility]  -Care coordinating with nursing staff

## 2025-02-16 ENCOUNTER — HOSPITAL ENCOUNTER (INPATIENT)
Facility: HOSPITAL | Age: 85
DRG: 640 | End: 2025-02-16
Attending: EMERGENCY MEDICINE | Admitting: INTERNAL MEDICINE
Payer: COMMERCIAL

## 2025-02-16 ENCOUNTER — APPOINTMENT (OUTPATIENT)
Dept: RADIOLOGY | Facility: HOSPITAL | Age: 85
DRG: 640 | End: 2025-02-16
Payer: COMMERCIAL

## 2025-02-16 ENCOUNTER — APPOINTMENT (OUTPATIENT)
Dept: CARDIOLOGY | Facility: HOSPITAL | Age: 85
DRG: 640 | End: 2025-02-16
Payer: COMMERCIAL

## 2025-02-16 VITALS
SYSTOLIC BLOOD PRESSURE: 148 MMHG | DIASTOLIC BLOOD PRESSURE: 62 MMHG | WEIGHT: 160.94 LBS | HEART RATE: 51 BPM | RESPIRATION RATE: 15 BRPM | BODY MASS INDEX: 23.84 KG/M2 | TEMPERATURE: 98.4 F | OXYGEN SATURATION: 98 % | HEIGHT: 69 IN

## 2025-02-16 DIAGNOSIS — G93.41 ACUTE METABOLIC ENCEPHALOPATHY: ICD-10-CM

## 2025-02-16 DIAGNOSIS — G51.0 BELL'S PALSY: ICD-10-CM

## 2025-02-16 DIAGNOSIS — R33.9 URINARY RETENTION: ICD-10-CM

## 2025-02-16 DIAGNOSIS — E87.0 HYPERNATREMIA: Primary | ICD-10-CM

## 2025-02-16 LAB
ALBUMIN SERPL BCP-MCNC: 3.4 G/DL (ref 3.4–5)
ALP SERPL-CCNC: 62 U/L (ref 33–136)
ALT SERPL W P-5'-P-CCNC: <3 U/L (ref 10–52)
ANION GAP SERPL CALC-SCNC: 9 MMOL/L (ref 10–20)
APPEARANCE UR: CLEAR
APTT PPP: 24 SECONDS (ref 27–38)
AST SERPL W P-5'-P-CCNC: 13 U/L (ref 9–39)
BASOPHILS # BLD AUTO: 0.03 X10*3/UL (ref 0–0.1)
BASOPHILS NFR BLD AUTO: 0.5 %
BILIRUB SERPL-MCNC: 0.7 MG/DL (ref 0–1.2)
BILIRUB UR STRIP.AUTO-MCNC: NEGATIVE MG/DL
BUN SERPL-MCNC: 37 MG/DL (ref 6–23)
CALCIUM SERPL-MCNC: 8.4 MG/DL (ref 8.6–10.3)
CARDIAC TROPONIN I PNL SERPL HS: 10 NG/L (ref 0–20)
CHLORIDE SERPL-SCNC: 125 MMOL/L (ref 98–107)
CO2 SERPL-SCNC: 30 MMOL/L (ref 21–32)
COLOR UR: ABNORMAL
CREAT SERPL-MCNC: 1.72 MG/DL (ref 0.5–1.3)
EGFRCR SERPLBLD CKD-EPI 2021: 38 ML/MIN/1.73M*2
EOSINOPHIL # BLD AUTO: 0.17 X10*3/UL (ref 0–0.4)
EOSINOPHIL NFR BLD AUTO: 2.7 %
ERYTHROCYTE [DISTWIDTH] IN BLOOD BY AUTOMATED COUNT: 14.4 % (ref 11.5–14.5)
GLUCOSE BLD MANUAL STRIP-MCNC: 79 MG/DL (ref 74–99)
GLUCOSE SERPL-MCNC: 92 MG/DL (ref 74–99)
GLUCOSE UR STRIP.AUTO-MCNC: NORMAL MG/DL
HCT VFR BLD AUTO: 36.3 % (ref 41–52)
HGB BLD-MCNC: 10.6 G/DL (ref 13.5–17.5)
HYALINE CASTS #/AREA URNS AUTO: ABNORMAL /LPF
IMM GRANULOCYTES # BLD AUTO: 0.03 X10*3/UL (ref 0–0.5)
IMM GRANULOCYTES NFR BLD AUTO: 0.5 % (ref 0–0.9)
INR PPP: 1.2 (ref 0.9–1.1)
KETONES UR STRIP.AUTO-MCNC: NEGATIVE MG/DL
LACTATE SERPL-SCNC: 1 MMOL/L (ref 0.4–2)
LEUKOCYTE ESTERASE UR QL STRIP.AUTO: NEGATIVE
LYMPHOCYTES # BLD AUTO: 1.53 X10*3/UL (ref 0.8–3)
LYMPHOCYTES NFR BLD AUTO: 24.2 %
MCH RBC QN AUTO: 27.8 PG (ref 26–34)
MCHC RBC AUTO-ENTMCNC: 29.2 G/DL (ref 32–36)
MCV RBC AUTO: 95 FL (ref 80–100)
MONOCYTES # BLD AUTO: 0.55 X10*3/UL (ref 0.05–0.8)
MONOCYTES NFR BLD AUTO: 8.7 %
MUCOUS THREADS #/AREA URNS AUTO: ABNORMAL /LPF
NEUTROPHILS # BLD AUTO: 4 X10*3/UL (ref 1.6–5.5)
NEUTROPHILS NFR BLD AUTO: 63.4 %
NITRITE UR QL STRIP.AUTO: NEGATIVE
NRBC BLD-RTO: 0 /100 WBCS (ref 0–0)
PH UR STRIP.AUTO: 5 [PH]
PLATELET # BLD AUTO: 151 X10*3/UL (ref 150–450)
POTASSIUM SERPL-SCNC: 4.1 MMOL/L (ref 3.5–5.3)
PROT SERPL-MCNC: 5.9 G/DL (ref 6.4–8.2)
PROT UR STRIP.AUTO-MCNC: NEGATIVE MG/DL
PROTHROMBIN TIME: 14 SECONDS (ref 9.8–12.8)
RBC # BLD AUTO: 3.81 X10*6/UL (ref 4.5–5.9)
RBC # UR STRIP.AUTO: ABNORMAL MG/DL
RBC #/AREA URNS AUTO: >20 /HPF
SODIUM SERPL-SCNC: 160 MMOL/L (ref 136–145)
SP GR UR STRIP.AUTO: 1.02
UROBILINOGEN UR STRIP.AUTO-MCNC: NORMAL MG/DL
WBC # BLD AUTO: 6.3 X10*3/UL (ref 4.4–11.3)
WBC #/AREA URNS AUTO: ABNORMAL /HPF

## 2025-02-16 PROCEDURE — 80053 COMPREHEN METABOLIC PANEL: CPT | Performed by: EMERGENCY MEDICINE

## 2025-02-16 PROCEDURE — 96361 HYDRATE IV INFUSION ADD-ON: CPT

## 2025-02-16 PROCEDURE — 96360 HYDRATION IV INFUSION INIT: CPT

## 2025-02-16 PROCEDURE — 81001 URINALYSIS AUTO W/SCOPE: CPT | Performed by: EMERGENCY MEDICINE

## 2025-02-16 PROCEDURE — 36415 COLL VENOUS BLD VENIPUNCTURE: CPT | Performed by: EMERGENCY MEDICINE

## 2025-02-16 PROCEDURE — 85610 PROTHROMBIN TIME: CPT | Performed by: EMERGENCY MEDICINE

## 2025-02-16 PROCEDURE — 99222 1ST HOSP IP/OBS MODERATE 55: CPT | Performed by: INTERNAL MEDICINE

## 2025-02-16 PROCEDURE — 85730 THROMBOPLASTIN TIME PARTIAL: CPT | Performed by: EMERGENCY MEDICINE

## 2025-02-16 PROCEDURE — 70450 CT HEAD/BRAIN W/O DYE: CPT

## 2025-02-16 PROCEDURE — 85025 COMPLETE CBC W/AUTO DIFF WBC: CPT | Performed by: EMERGENCY MEDICINE

## 2025-02-16 PROCEDURE — 94640 AIRWAY INHALATION TREATMENT: CPT

## 2025-02-16 PROCEDURE — 99291 CRITICAL CARE FIRST HOUR: CPT | Performed by: EMERGENCY MEDICINE

## 2025-02-16 PROCEDURE — 2500000004 HC RX 250 GENERAL PHARMACY W/ HCPCS (ALT 636 FOR OP/ED): Performed by: INTERNAL MEDICINE

## 2025-02-16 PROCEDURE — 71045 X-RAY EXAM CHEST 1 VIEW: CPT

## 2025-02-16 PROCEDURE — 71045 X-RAY EXAM CHEST 1 VIEW: CPT | Mod: FOREIGN READ | Performed by: RADIOLOGY

## 2025-02-16 PROCEDURE — G0426 INPT/ED TELECONSULT50: HCPCS | Performed by: STUDENT IN AN ORGANIZED HEALTH CARE EDUCATION/TRAINING PROGRAM

## 2025-02-16 PROCEDURE — 70450 CT HEAD/BRAIN W/O DYE: CPT | Performed by: RADIOLOGY

## 2025-02-16 PROCEDURE — 1200000002 HC GENERAL ROOM WITH TELEMETRY DAILY

## 2025-02-16 PROCEDURE — 93005 ELECTROCARDIOGRAM TRACING: CPT

## 2025-02-16 PROCEDURE — 82947 ASSAY GLUCOSE BLOOD QUANT: CPT

## 2025-02-16 PROCEDURE — 83605 ASSAY OF LACTIC ACID: CPT | Performed by: EMERGENCY MEDICINE

## 2025-02-16 PROCEDURE — 2500000002 HC RX 250 W HCPCS SELF ADMINISTERED DRUGS (ALT 637 FOR MEDICARE OP, ALT 636 FOR OP/ED): Performed by: INTERNAL MEDICINE

## 2025-02-16 PROCEDURE — 2500000004 HC RX 250 GENERAL PHARMACY W/ HCPCS (ALT 636 FOR OP/ED): Performed by: EMERGENCY MEDICINE

## 2025-02-16 PROCEDURE — 84484 ASSAY OF TROPONIN QUANT: CPT | Performed by: EMERGENCY MEDICINE

## 2025-02-16 RX ORDER — POLYETHYLENE GLYCOL 3350 17 G/17G
17 POWDER, FOR SOLUTION ORAL DAILY PRN
COMMUNITY

## 2025-02-16 RX ORDER — CARBIDOPA AND LEVODOPA 25; 100 MG/1; MG/1
3 TABLET ORAL 3 TIMES DAILY
COMMUNITY

## 2025-02-16 RX ORDER — BUDESONIDE 0.5 MG/2ML
0.5 INHALANT ORAL 2 TIMES DAILY
Status: DISCONTINUED | OUTPATIENT
Start: 2025-02-16 | End: 2025-02-21 | Stop reason: HOSPADM

## 2025-02-16 RX ORDER — PANTOPRAZOLE SODIUM 40 MG/1
40 TABLET, DELAYED RELEASE ORAL
Status: DISCONTINUED | OUTPATIENT
Start: 2025-02-17 | End: 2025-02-21 | Stop reason: HOSPADM

## 2025-02-16 RX ORDER — ACETAMINOPHEN 650 MG/1
650 SUPPOSITORY RECTAL EVERY 4 HOURS PRN
Status: DISCONTINUED | OUTPATIENT
Start: 2025-02-16 | End: 2025-02-21 | Stop reason: HOSPADM

## 2025-02-16 RX ORDER — MEMANTINE HYDROCHLORIDE 5 MG/1
5 TABLET ORAL 2 TIMES DAILY
COMMUNITY

## 2025-02-16 RX ORDER — ACETAMINOPHEN 160 MG/5ML
650 SOLUTION ORAL EVERY 4 HOURS PRN
Status: DISCONTINUED | OUTPATIENT
Start: 2025-02-16 | End: 2025-02-21 | Stop reason: HOSPADM

## 2025-02-16 RX ORDER — ALBUTEROL SULFATE 90 UG/1
2 INHALANT RESPIRATORY (INHALATION) EVERY 4 HOURS PRN
Status: DISCONTINUED | OUTPATIENT
Start: 2025-02-16 | End: 2025-02-16 | Stop reason: CLARIF

## 2025-02-16 RX ORDER — ALBUTEROL SULFATE 0.83 MG/ML
2.5 SOLUTION RESPIRATORY (INHALATION) EVERY 4 HOURS PRN
Status: DISCONTINUED | OUTPATIENT
Start: 2025-02-16 | End: 2025-02-16

## 2025-02-16 RX ORDER — TAMSULOSIN HYDROCHLORIDE 0.4 MG/1
0.4 CAPSULE ORAL NIGHTLY
COMMUNITY

## 2025-02-16 RX ORDER — POLYETHYLENE GLYCOL 3350 17 G/17G
17 POWDER, FOR SOLUTION ORAL DAILY PRN
Status: DISCONTINUED | OUTPATIENT
Start: 2025-02-16 | End: 2025-02-21 | Stop reason: HOSPADM

## 2025-02-16 RX ORDER — FUROSEMIDE 20 MG/1
20 TABLET ORAL DAILY
COMMUNITY
End: 2025-02-21 | Stop reason: HOSPADM

## 2025-02-16 RX ORDER — ACETAMINOPHEN 325 MG/1
650 TABLET ORAL EVERY 4 HOURS PRN
Status: DISCONTINUED | OUTPATIENT
Start: 2025-02-16 | End: 2025-02-21 | Stop reason: HOSPADM

## 2025-02-16 RX ORDER — ACETAMINOPHEN 325 MG/1
650 TABLET ORAL EVERY 6 HOURS PRN
COMMUNITY

## 2025-02-16 RX ORDER — DEXTROSE MONOHYDRATE AND SODIUM CHLORIDE 5; .45 G/100ML; G/100ML
125 INJECTION, SOLUTION INTRAVENOUS CONTINUOUS
Status: DISCONTINUED | OUTPATIENT
Start: 2025-02-16 | End: 2025-02-17

## 2025-02-16 RX ORDER — PANTOPRAZOLE SODIUM 40 MG/10ML
40 INJECTION, POWDER, LYOPHILIZED, FOR SOLUTION INTRAVENOUS
Status: DISCONTINUED | OUTPATIENT
Start: 2025-02-17 | End: 2025-02-21 | Stop reason: HOSPADM

## 2025-02-16 RX ORDER — HYDRALAZINE HYDROCHLORIDE 10 MG/1
10 TABLET, FILM COATED ORAL EVERY 8 HOURS PRN
COMMUNITY
End: 2025-02-21 | Stop reason: HOSPADM

## 2025-02-16 RX ORDER — LORAZEPAM 2 MG/ML
1 INJECTION INTRAMUSCULAR EVERY 8 HOURS PRN
Status: DISCONTINUED | OUTPATIENT
Start: 2025-02-16 | End: 2025-02-21 | Stop reason: HOSPADM

## 2025-02-16 RX ORDER — ALBUTEROL SULFATE 0.83 MG/ML
2.5 SOLUTION RESPIRATORY (INHALATION) EVERY 2 HOUR PRN
Status: DISCONTINUED | OUTPATIENT
Start: 2025-02-16 | End: 2025-02-21 | Stop reason: HOSPADM

## 2025-02-16 RX ORDER — POTASSIUM CHLORIDE 20 MEQ/1
20 TABLET, EXTENDED RELEASE ORAL DAILY
COMMUNITY

## 2025-02-16 RX ORDER — ENOXAPARIN SODIUM 100 MG/ML
40 INJECTION SUBCUTANEOUS EVERY 24 HOURS
Status: DISCONTINUED | OUTPATIENT
Start: 2025-02-16 | End: 2025-02-21 | Stop reason: HOSPADM

## 2025-02-16 RX ORDER — POLYETHYLENE GLYCOL 3350 17 G/17G
17 POWDER, FOR SOLUTION ORAL DAILY PRN
Status: DISCONTINUED | OUTPATIENT
Start: 2025-02-16 | End: 2025-02-16 | Stop reason: SDUPTHER

## 2025-02-16 RX ORDER — MEMANTINE HYDROCHLORIDE 5 MG/1
5 TABLET ORAL 2 TIMES DAILY
Status: DISCONTINUED | OUTPATIENT
Start: 2025-02-16 | End: 2025-02-21 | Stop reason: HOSPADM

## 2025-02-16 RX ORDER — ONDANSETRON HYDROCHLORIDE 2 MG/ML
4 INJECTION, SOLUTION INTRAVENOUS EVERY 8 HOURS PRN
Status: DISCONTINUED | OUTPATIENT
Start: 2025-02-16 | End: 2025-02-21 | Stop reason: HOSPADM

## 2025-02-16 RX ORDER — BUDESONIDE 0.5 MG/2ML
0.5 INHALANT ORAL 2 TIMES DAILY
COMMUNITY

## 2025-02-16 RX ORDER — SENNOSIDES 8.6 MG/1
1 TABLET ORAL 2 TIMES DAILY PRN
COMMUNITY

## 2025-02-16 RX ORDER — ONDANSETRON 4 MG/1
4 TABLET, FILM COATED ORAL EVERY 8 HOURS PRN
Status: DISCONTINUED | OUTPATIENT
Start: 2025-02-16 | End: 2025-02-21 | Stop reason: HOSPADM

## 2025-02-16 RX ORDER — TALC
3 POWDER (GRAM) TOPICAL NIGHTLY PRN
Status: DISCONTINUED | OUTPATIENT
Start: 2025-02-16 | End: 2025-02-21 | Stop reason: HOSPADM

## 2025-02-16 RX ORDER — TAMSULOSIN HYDROCHLORIDE 0.4 MG/1
0.4 CAPSULE ORAL NIGHTLY
Status: DISCONTINUED | OUTPATIENT
Start: 2025-02-16 | End: 2025-02-21 | Stop reason: HOSPADM

## 2025-02-16 RX ORDER — GUAIFENESIN 600 MG/1
600 TABLET, EXTENDED RELEASE ORAL EVERY 12 HOURS PRN
Status: DISCONTINUED | OUTPATIENT
Start: 2025-02-16 | End: 2025-02-21 | Stop reason: HOSPADM

## 2025-02-16 RX ORDER — ALBUTEROL SULFATE 90 UG/1
2 INHALANT RESPIRATORY (INHALATION) EVERY 4 HOURS PRN
COMMUNITY

## 2025-02-16 RX ORDER — CARBIDOPA AND LEVODOPA 25; 100 MG/1; MG/1
3 TABLET, EXTENDED RELEASE ORAL 3 TIMES DAILY
Status: DISCONTINUED | OUTPATIENT
Start: 2025-02-16 | End: 2025-02-17

## 2025-02-16 RX ADMIN — DEXTROSE AND SODIUM CHLORIDE 125 ML/HR: 5; 450 INJECTION, SOLUTION INTRAVENOUS at 15:04

## 2025-02-16 RX ADMIN — ENOXAPARIN SODIUM 40 MG: 100 INJECTION SUBCUTANEOUS at 18:54

## 2025-02-16 RX ADMIN — BUDESONIDE 0.5 MG: 0.5 INHALANT RESPIRATORY (INHALATION) at 20:33

## 2025-02-16 RX ADMIN — DEXTROSE AND SODIUM CHLORIDE 125 ML/HR: 5; 450 INJECTION, SOLUTION INTRAVENOUS at 22:32

## 2025-02-16 ASSESSMENT — COLUMBIA-SUICIDE SEVERITY RATING SCALE - C-SSRS
2. HAVE YOU ACTUALLY HAD ANY THOUGHTS OF KILLING YOURSELF?: NO
6. HAVE YOU EVER DONE ANYTHING, STARTED TO DO ANYTHING, OR PREPARED TO DO ANYTHING TO END YOUR LIFE?: NO
1. IN THE PAST MONTH, HAVE YOU WISHED YOU WERE DEAD OR WISHED YOU COULD GO TO SLEEP AND NOT WAKE UP?: NO

## 2025-02-16 ASSESSMENT — PAIN - FUNCTIONAL ASSESSMENT: PAIN_FUNCTIONAL_ASSESSMENT: 0-10

## 2025-02-16 ASSESSMENT — PAIN SCALES - GENERAL
PAINLEVEL_OUTOF10: 0 - NO PAIN

## 2025-02-16 ASSESSMENT — PAIN DESCRIPTION - PROGRESSION: CLINICAL_PROGRESSION: NOT CHANGED

## 2025-02-16 ASSESSMENT — ACTIVITIES OF DAILY LIVING (ADL)
BEDSIDE_CLEANING: YES
AMBULATION_ASSISTANCE: OTHER (COMMENT)

## 2025-02-16 NOTE — CONSULTS
"Inpatient consult to Neuro TeleStroke  Consult performed by: Say Hall MD  Consult ordered by: Gennaro Araujo MD          History Of Present Illness:  Historian: ED Provider   Flakito Mcfarlane is a 85 y.o. male presenting with h/o dementia, aao x1 at baseline, parkinsons's disease, lives in nursing home, presented to ED after staff noticed L facial droop today. .  Last known well: Yesterday, unclear time  Had stroke symptoms resolved at time of presentation: No     Prior Functional Status (Modified Stanton Scale):  4 The patient has moderately severe disability; unable to walk or attend to bodily functions without assistance of another individual.     Stroke Risk Factors:  Hypertension    Last Recorded Vitals:  Blood pressure 126/75, pulse 65, temperature 36.9 °C (98.5 °F), temperature source Oral, resp. rate 14, height 1.753 m (5' 9\"), weight 73 kg (160 lb 15 oz), SpO2 (!) 93%.      NIHSS:  1A. Level of Consciousness: 1  1B. Ask Month and Age: 2  1C. Blink Eyes & Squeeze Hands: 0  2. Best Gaze: 0  3. Visual: 0  4. Facial Palsy: 2  5A. Motor - Left Arm: 0  5B. Motor - Right Arm: 0  6A. Motor - Left Le  6B. Motor - Right Le  7. Limb Ataxia: 0  8. Sensory Loss: 0  9. Best Language: 1  10. Dysarthria: 1  11. Extinction and Inattention: 0  NIH Stroke Scale: 11     L upper and lower facial muscle weakness    Relevant Results:  LABS:  Glucose   Date Value Ref Range Status   2025 92 74 - 99 mg/dL Final     INR   Date Value Ref Range Status   2025 1.2 (H) 0.9 - 1.1 Final      Results for orders placed or performed during the hospital encounter of 25 (from the past 24 hours)   POCT GLUCOSE   Result Value Ref Range    POCT Glucose 79 74 - 99 mg/dL   Comprehensive metabolic panel   Result Value Ref Range    Glucose 92 74 - 99 mg/dL    Sodium 160 (HH) 136 - 145 mmol/L    Potassium 4.1 3.5 - 5.3 mmol/L    Chloride 125 (H) 98 - 107 mmol/L    Bicarbonate 30 21 - 32 mmol/L    Anion Gap 9 (L) 10 - 20 " mmol/L    Urea Nitrogen 37 (H) 6 - 23 mg/dL    Creatinine 1.72 (H) 0.50 - 1.30 mg/dL    eGFR 38 (L) >60 mL/min/1.73m*2    Calcium 8.4 (L) 8.6 - 10.3 mg/dL    Albumin 3.4 3.4 - 5.0 g/dL    Alkaline Phosphatase 62 33 - 136 U/L    Total Protein 5.9 (L) 6.4 - 8.2 g/dL    AST 13 9 - 39 U/L    Bilirubin, Total 0.7 0.0 - 1.2 mg/dL    ALT <3 (L) 10 - 52 U/L   CBC and Auto Differential   Result Value Ref Range    WBC 6.3 4.4 - 11.3 x10*3/uL    nRBC 0.0 0.0 - 0.0 /100 WBCs    RBC 3.81 (L) 4.50 - 5.90 x10*6/uL    Hemoglobin 10.6 (L) 13.5 - 17.5 g/dL    Hematocrit 36.3 (L) 41.0 - 52.0 %    MCV 95 80 - 100 fL    MCH 27.8 26.0 - 34.0 pg    MCHC 29.2 (L) 32.0 - 36.0 g/dL    RDW 14.4 11.5 - 14.5 %    Platelets 151 150 - 450 x10*3/uL    Neutrophils % 63.4 40.0 - 80.0 %    Immature Granulocytes %, Automated 0.5 0.0 - 0.9 %    Lymphocytes % 24.2 13.0 - 44.0 %    Monocytes % 8.7 2.0 - 10.0 %    Eosinophils % 2.7 0.0 - 6.0 %    Basophils % 0.5 0.0 - 2.0 %    Neutrophils Absolute 4.00 1.60 - 5.50 x10*3/uL    Immature Granulocytes Absolute, Automated 0.03 0.00 - 0.50 x10*3/uL    Lymphocytes Absolute 1.53 0.80 - 3.00 x10*3/uL    Monocytes Absolute 0.55 0.05 - 0.80 x10*3/uL    Eosinophils Absolute 0.17 0.00 - 0.40 x10*3/uL    Basophils Absolute 0.03 0.00 - 0.10 x10*3/uL   Protime-INR   Result Value Ref Range    Protime 14.0 (H) 9.8 - 12.8 seconds    INR 1.2 (H) 0.9 - 1.1   APTT   Result Value Ref Range    aPTT 24 (L) 27 - 38 seconds   Lactate   Result Value Ref Range    Lactate 1.0 0.4 - 2.0 mmol/L        CT Head Imaging:  [facility] imaging personally reviewed, showed no acute ischemic / hemorrhagic changes     CTA Head and Neck Imaging:  CTA imaging not performed       Diagnosis:  L lower motor neuron facial palsy. Suspect Bell's palsy. Patient is a poor historian and is not able to provide history and no focal weakness noted in arms/ legs - symmetric.     IV Thrombolysis IV Thrombolysis Checklist      IV Thrombolysis Given: No; Thrombolysis  contraindication reason: Time from Last Known Well (or stroke onset) is >4.5 hours           Patient is a candidate for thrombectomy:  yes/no: No; contraindication reason: Significant pre-stroke disability (pre-stroke mRS >1)    Additional Recommendations:  Likely Bell's palsy. Given poor history and limited exam from baseline dementia and age, reasonable to consider MRI w/o contrast.     Disposition:  Patient will remain at referring facility for further evaluation and management.    Virtual or Telephone Consent    An interactive audio and video telecommunication system which permits real time communications between the patient (at the originating site) and provider (at the distant site) was utilized to provide this telehealth service.   Verbal consent was requested and obtained from Flakito Mcfarlane on this date, 02/16/25 for a telehealth visit.    Virtual Visit start time: 1309, 2/16 after cart is connected    Say Hall MD

## 2025-02-16 NOTE — H&P
Flakito Mcfarlane is a 85 y.o. male   HPI   Patient with a past medical history of dementia along with Parkinson's disease, benign prostatic hypertrophy, physical debility who is mostly bedbound was noted to have a facial droop today and was transferred to the hospital seen by neurology and they feel this could be Bell's palsy  Also noted to have significant hypernatremia  Unclear as to how much she has been eating at the nursing facility  Past Medical History  Past Medical History:   Diagnosis Date    Parkinson's disease (Multi) 01/18/2022    Parkinson disease, symptomatic    Personal history of other diseases of the circulatory system 05/13/2021    History of hypertension       Surgical History  Past Surgical History:   Procedure Laterality Date    OTHER SURGICAL HISTORY  08/13/2019    Prostate surgery        Social History  He has no history on file for tobacco use, alcohol use, and drug use.    Family History  No family history on file.     Allergies  Penicillins    Review of Systems     UnAble to do review of system because of dementia    Vitals:    02/16/25 1306   BP: 126/75   Pulse: 65   Resp: 14   Temp: 36.9 °C (98.5 °F)   SpO2: (!) 93%        Scheduled medications     Continuous medications  dextrose 5%-0.45 % sodium chloride, 125 mL/hr, Last Rate: 125 mL/hr (02/16/25 1504)      PRN medications      Results from last 7 days   Lab Units 02/16/25  1333   WBC AUTO x10*3/uL 6.3   HEMOGLOBIN g/dL 10.6*   HEMATOCRIT % 36.3*   PLATELETS AUTO x10*3/uL 151     Results from last 7 days   Lab Units 02/16/25  1333   SODIUM mmol/L 160*   POTASSIUM mmol/L 4.1   CHLORIDE mmol/L 125*   CO2 mmol/L 30   BUN mg/dL 37*   CREATININE mg/dL 1.72*   CALCIUM mg/dL 8.4*   PROTEIN TOTAL g/dL 5.9*   BILIRUBIN TOTAL mg/dL 0.7   ALK PHOS U/L 62   ALT U/L <3*   AST U/L 13   GLUCOSE mg/dL 92            XR chest 1 view   Final Result   Stable exam. No detectable active cardiopulmonary disease.   Signed by Seth Moise MD      CT brain attack head  wo IV contrast   Final Result   Addendum (preliminary) 1 of 1   Interpreted By:  Rodney Helms,    ADDENDUM:   Rodney Helms discussed the significance and urgency of this   critical finding by secure chat with  DEON PETERSEN on 2/16/2025 at   1:29 pm.  (**-RCF-**) Findings:  See findings.             Signed by: Rodney Helms 2/16/2025 1:30 PM        -------- ORIGINAL REPORT --------   Dictation workstation:   [id number]      Final   No evidence of acute cortical infarct or intracranial hemorrhage.        Atrophy and nonspecific low-density white matter changes.        Mucosal thickening paranasal sinuses with mild newly seen secretions   and/or fluid left maxillary and left sphenoid sinus. Correlate with   symptomatology.        MACRO:   None        Signed by: Rodney Helms 2/16/2025 1:21 PM   Dictation workstation:   [id number]          Physical Exam      Constitutional   General appearance: Alert   Eyes   Inspection of eyes: Sclera and conjunctiva were normal.      Pulmonary   Respiratory assessment: No respiratory distress, normal respiratory rhythm and effort.    Auscultation of Lungs: Clear bilateral breath sounds.   Cardiovascular   Auscultation of heart: Apical pulse normal, heart rate and rhythm normal, normal S1 and S2, no murmurs and no pericardial rub.    Exam for edema: No peripheral edema.   Abdomen   Abdominal Exam: No bruits, normal bowel sounds, soft, non-tender, no abdominal mass palpated.    Liver and Spleen exam: No hepato-splenomegaly.     Skin   Skin inspection: Normal skin color and pigmentation, normal skin turgor and no visible rash.   Neurologic   Facial nerve palsy  Alert x 1  Tremors        Assessment/Plan      Bell's palsy  Hypernatremia  Dehydration  Starting IV fluids  Speech therapy and nutrition consult  Monitor sodium    #Parkinson's disease with dementia  Resume home medications

## 2025-02-16 NOTE — ED PROVIDER NOTES
HPI   No chief complaint on file.      HPI: []  85-year-old  male with history hypertension, Parkinson disease comes in with altered mental status and left ischial droop.  Staff noticed that his left face was droopy this morning.  Last well-known was sometime yesterday last with no note available exactly what was definitely more than 24 hours ago.  No history of known trauma falls fever chills nausea vomit diarrhea cough congestion incontinence seizures syncope onus syncope no hematemesis melena hematochezia no hemoptysis no recent travel or hospitalization.    Past history: Hypertension, Parkinson disease  Social: Patient denies current tobacco alcohol drug abuse.  REVIEW OF SYSTEMS:    GENERAL.: No weight loss, fatigue, anorexia, insomnia, fever.    EYES: No vision loss, double vision, drainage, eye pain.    ENT: No pharyngitis, dry mouth.    CARDIOPULMONARY: No chest pain, palpitations, syncope, near syncope. No shortness of breath, cough, hemoptysis.    GI: No abdominal pain, change in bowel habits, melena, hematemesis, hematochezia, nausea, vomiting, diarrhea.    : No discharge, dysuria, frequency, urgency, hematuria.    MS: No limb pain, joint pain, joint swelling.  Neuro: Positive left facial droop  SKIN: No rashes.    PSYCH: No depression, anxiety, suicidality, homicidality.    Review of systems is otherwise negative unless stated above or in history of present illness.  Social history, family history, allergies reviewed.  PHYSICAL EXAM:    GENERAL: Vitals noted, no distress. Alert and oriented  x 2. Non-toxic.      EENT: TMs clear. Posterior oropharynx unremarkable. No meningismus. No LAD.     NECK: Supple. Nontender. No midline tenderness.     CARDIAC: Regular, rate, rhythm. No murmurs rubs or gallops. No JVD    PULMONARY: Lungs clear bilaterally with good aeration. No wheezes rales or rhonchi. No respiratory distress.  No tachypnea stridor or retractions able to speak in full  sentences    ABDOMEN: Soft, nonsurgical. Nontender. No peritoneal signs. Normoactive bowel sounds. No pulsatile masses.     EXTREMITIES: No peripheral edema. Negative Homans bilaterally, no cords.  2+ bounding pulses well-perfused.    SKIN: No rash. Intact.     NEURO: Patient has left facial droop, with NIH score of 1. Cranial nerves normal as tested from II through XII.     MEDICAL DECISION MAKING:  EKG on my interpretation shows a junctional rhythm normal axis rate is mid 60s with no acute ischemic changes.  CT head negative  CBC with shows no leukocytosis stable hemoglobin base metabolic panel showed TC with sodium of 160 which is elevated.  UA pending.  Chest x-ray negative.    Treatment:/ED course.  Patient was seen by myself upon arrival at the [facility] area.  At 1 PM.  He is Van negative.  He was taken to CT directly.  Wait consult telestroke neurologist who did eval the patient and did not believe patient probably has a Bell's palsy and not a candidate for TNK and/or thrombectomy and/or CTA.  IV fluids initiated D5 half-normal saline.    Impression: #1 acute metabolic encephalopathy, #2 left facial droop possibly Bell's palsy, #3 strokelike symptoms, #4 hyponatremia #5 acute kidney injury    Plan/MDM: 85-year-old male comes in with facial droop last known was a 24 hours ago.  On exam his stroke scale is 1 acute Bell's palsy could be a stroke early CVA hard to say due to his advanced Parkinson disease exam somewhat limited but not a candidate for TNK and/or thrombectomy based on his presentation Van negative, also has a mild TC with the profound hypernatremia which could be nutritional, low concern for septic shock sepsis STEMI or NSTEMI, patient will be hospitalized for further care.              Patient History   Past Medical History:   Diagnosis Date    Parkinson's disease (Multi) 01/18/2022    Parkinson disease, symptomatic    Personal history of other diseases of the circulatory system 05/13/2021     History of hypertension     Past Surgical History:   Procedure Laterality Date    OTHER SURGICAL HISTORY  08/13/2019    Prostate surgery     No family history on file.  Social History     Tobacco Use    Smoking status: Not on file    Smokeless tobacco: Not on file   Substance Use Topics    Alcohol use: Not on file    Drug use: Not on file       Physical Exam   ED Triage Vitals [02/16/25 1306]   Temperature Heart Rate Respirations BP   36.9 °C (98.5 °F) 65 14 126/75      Pulse Ox Temp Source Heart Rate Source Patient Position   (!) 93 % Oral Monitor Lying      BP Location FiO2 (%)     Right arm --       Physical Exam      ED Course & MDM   ED Course as of 02/16/25 1658   Sun Feb 16, 2025   1657 CT head is negative, basic metabolic panel is concerning for acute kidney injury and hyponatremia, neurology consulted they feel patient probably has a Bell's palsy IV fluids initiated D5 half-normal saline patient will be hospitalized for further care. [MT]      ED Course User Index  [MT] Gennaro Araujo MD         Diagnoses as of 02/16/25 1658   Bell's palsy   Acute metabolic encephalopathy   Hypernatremia                 No data recorded     Perri Coma Scale Score: 13 (02/16/25 1247 : Neel Stewart, RN)       NIH Stroke Scale: 11 (02/16/25 1247 : Neel Stewart, RN)                   Medical Decision Making      Procedure  Critical Care    Performed by: Gennaro Araujo MD  Authorized by: Gennaro Araujo MD    Critical care provider statement:     Critical care time (minutes):  45    Critical care time was exclusive of:  Separately billable procedures and treating other patients    Critical care was necessary to treat or prevent imminent or life-threatening deterioration of the following conditions:  Metabolic crisis and CNS failure or compromise    Critical care was time spent personally by me on the following activities:  Blood draw for specimens, development of treatment plan with patient or surrogate, discussions  with primary provider, evaluation of patient's response to treatment, examination of patient, review of old charts, re-evaluation of patient's condition, pulse oximetry, ordering and review of radiographic studies, ordering and review of laboratory studies, ordering and performing treatments and interventions and obtaining history from patient or surrogate    Care discussed with: admitting provider         Gennaro Araujo MD  02/16/25 1707       Gennaro Araujo MD  02/16/25 [id number]

## 2025-02-16 NOTE — PROGRESS NOTES
Pharmacy Medication History    Patient came from [facility], came with a medication list    Source of Information:     Additional concerns with the patient's PTA list.     The following updates were made to the Prior to Admission medication list:     Medications ADDED:   All Medications  Medications CHANGED:    Medications REMOVED:     Medications NOT TAKING:       Allergy reviewed : Yes    Meds 2 Beds : N/A    Outpatient pharmacy confirmed and updated in chart : N/A    Pharmacy name:     The list below reflectives the updated PTA list. Please review each medication in order reconciliation for additional clarification and justification.    Prior to Admission Medications   Prescriptions Last Dose Informant   acetaminophen (Tylenol) 325 mg tablet     Sig: Take 2 tablets (650 mg) by mouth every 6 hours if needed for mild pain (1 - 3) or fever (temp greater than 38.0 C).   albuterol 90 mcg/actuation inhaler     Sig: Inhale 2 puffs every 4 hours if needed for wheezing or shortness of breath.   budesonide (Pulmicort) 0.5 mg/2 mL nebulizer solution 2/15/2025    Sig: Take 2 mL (0.5 mg) by nebulization 2 times a day. Rinse mouth with water after use to reduce aftertaste and incidence of candidiasis. Do not swallow.   carbidopa-levodopa (Sinemet CR)  mg ER tablet 2/15/2025    Sig: Take 3 tablets by mouth 3 times a day. No specific times listed   furosemide (Lasix) 20 mg tablet 2/15/2025 Morning    Sig: Take 1 tablet (20 mg) by mouth once daily.   hydrALAZINE (Apresoline) 10 mg tablet     Sig: Take 1 tablet (10 mg) by mouth every 8 hours if needed (Migraine related hypertension). SBP > 170   memantine (Namenda) 5 mg tablet 2/15/2025 Bedtime    Sig: Take 1 tablet (5 mg) by mouth 2 times a day.   polyethylene glycol (Glycolax, Miralax) 17 gram packet     Sig: Take 17 g by mouth once daily as needed.   potassium chloride CR 20 mEq ER tablet 2/15/2025 Morning    Sig: Take 1 tablet (20 mEq) by mouth once daily. Do not  crush or chew.   sennosides (Senokot) 8.6 mg tablet     Sig: Take 1 tablet (8.6 mg) by mouth 2 times a day as needed for constipation.   tamsulosin (Flomax) 0.4 mg 24 hr capsule 2/15/2025 Bedtime    Sig: Take 1 capsule (0.4 mg) by mouth once daily at bedtime.      Facility-Administered Medications: None       The list below reflectives the updated allergy list. Please review each documented allergy for additional clarification and justification.    Allergies   Allergen Reactions    Penicillins Swelling and Rash          02/16/25 at 2:48 PM - Fernanda Truong

## 2025-02-16 NOTE — ED TRIAGE NOTES
Pt arriving from [facility], per EMS per SNF staff pt appeared to have L facial droop and AMS (not acting as alert as usual) assessed PTA, possibe LKN last night but SNF staff were inconsistent w/ stories, baseline unk. Hx Alzheimer's/dementia.

## 2025-02-17 ENCOUNTER — APPOINTMENT (OUTPATIENT)
Dept: RADIOLOGY | Facility: HOSPITAL | Age: 85
DRG: 640 | End: 2025-02-17
Payer: COMMERCIAL

## 2025-02-17 ENCOUNTER — APPOINTMENT (OUTPATIENT)
Dept: UROLOGY | Facility: HOSPITAL | Age: 85
DRG: 640 | End: 2025-02-17
Payer: COMMERCIAL

## 2025-02-17 LAB
ANION GAP SERPL CALC-SCNC: 10 MMOL/L (ref 10–20)
ANION GAP SERPL CALC-SCNC: 9 MMOL/L (ref 10–20)
BUN SERPL-MCNC: 22 MG/DL (ref 6–23)
BUN SERPL-MCNC: 27 MG/DL (ref 6–23)
CALCIUM SERPL-MCNC: 8 MG/DL (ref 8.6–10.3)
CALCIUM SERPL-MCNC: 8.1 MG/DL (ref 8.6–10.3)
CHLORIDE SERPL-SCNC: 121 MMOL/L (ref 98–107)
CHLORIDE SERPL-SCNC: 125 MMOL/L (ref 98–107)
CHLORIDE UR-SCNC: 169 MMOL/L
CHLORIDE/CREATININE (MMOL/G) IN URINE: 262 MMOL/G CREAT (ref 23–275)
CO2 SERPL-SCNC: 29 MMOL/L (ref 21–32)
CO2 SERPL-SCNC: 30 MMOL/L (ref 21–32)
CREAT SERPL-MCNC: 1.13 MG/DL (ref 0.5–1.3)
CREAT SERPL-MCNC: 1.28 MG/DL (ref 0.5–1.3)
CREAT UR-MCNC: 64.6 MG/DL (ref 20–370)
EGFRCR SERPLBLD CKD-EPI 2021: 55 ML/MIN/1.73M*2
EGFRCR SERPLBLD CKD-EPI 2021: 63 ML/MIN/1.73M*2
ERYTHROCYTE [DISTWIDTH] IN BLOOD BY AUTOMATED COUNT: 14.3 % (ref 11.5–14.5)
GLUCOSE SERPL-MCNC: 102 MG/DL (ref 74–99)
GLUCOSE SERPL-MCNC: 97 MG/DL (ref 74–99)
HCT VFR BLD AUTO: 36 % (ref 41–52)
HGB BLD-MCNC: 10.4 G/DL (ref 13.5–17.5)
MCH RBC QN AUTO: 28 PG (ref 26–34)
MCHC RBC AUTO-ENTMCNC: 28.9 G/DL (ref 32–36)
MCV RBC AUTO: 97 FL (ref 80–100)
NRBC BLD-RTO: 0 /100 WBCS (ref 0–0)
P OFFSET: 161 MS
P ONSET: 141 MS
PLATELET # BLD AUTO: 150 X10*3/UL (ref 150–450)
POTASSIUM SERPL-SCNC: 3.4 MMOL/L (ref 3.5–5.3)
POTASSIUM SERPL-SCNC: 4.1 MMOL/L (ref 3.5–5.3)
POTASSIUM UR-SCNC: 39 MMOL/L
POTASSIUM/CREAT UR-RTO: 60 MMOL/G CREAT
Q ONSET: 228 MS
QRS COUNT: 11 BEATS
QRS DURATION: 72 MS
QT INTERVAL: 394 MS
QTC CALCULATION(BAZETT): 413 MS
QTC FREDERICIA: 407 MS
R AXIS: -22 DEGREES
RBC # BLD AUTO: 3.72 X10*6/UL (ref 4.5–5.9)
SODIUM SERPL-SCNC: 157 MMOL/L (ref 136–145)
SODIUM SERPL-SCNC: 160 MMOL/L (ref 136–145)
SODIUM UR-SCNC: 136 MMOL/L
SODIUM/CREAT UR-RTO: 211 MMOL/G CREAT
T AXIS: 19 DEGREES
T OFFSET: 425 MS
VENTRICULAR RATE: 66 BPM
WBC # BLD AUTO: 6 X10*3/UL (ref 4.4–11.3)

## 2025-02-17 PROCEDURE — 36415 COLL VENOUS BLD VENIPUNCTURE: CPT | Performed by: NURSE PRACTITIONER

## 2025-02-17 PROCEDURE — 99232 SBSQ HOSP IP/OBS MODERATE 35: CPT | Performed by: INTERNAL MEDICINE

## 2025-02-17 PROCEDURE — 70544 MR ANGIOGRAPHY HEAD W/O DYE: CPT | Performed by: RADIOLOGY

## 2025-02-17 PROCEDURE — 92610 EVALUATE SWALLOWING FUNCTION: CPT | Mod: GN

## 2025-02-17 PROCEDURE — 70547 MR ANGIOGRAPHY NECK W/O DYE: CPT | Performed by: RADIOLOGY

## 2025-02-17 PROCEDURE — 2500000002 HC RX 250 W HCPCS SELF ADMINISTERED DRUGS (ALT 637 FOR MEDICARE OP, ALT 636 FOR OP/ED): Performed by: NURSE PRACTITIONER

## 2025-02-17 PROCEDURE — 2500000005 HC RX 250 GENERAL PHARMACY W/O HCPCS: Performed by: INTERNAL MEDICINE

## 2025-02-17 PROCEDURE — 2500000004 HC RX 250 GENERAL PHARMACY W/ HCPCS (ALT 636 FOR OP/ED): Performed by: NURSE PRACTITIONER

## 2025-02-17 PROCEDURE — 70544 MR ANGIOGRAPHY HEAD W/O DYE: CPT

## 2025-02-17 PROCEDURE — 94640 AIRWAY INHALATION TREATMENT: CPT

## 2025-02-17 PROCEDURE — 80048 BASIC METABOLIC PNL TOTAL CA: CPT | Performed by: INTERNAL MEDICINE

## 2025-02-17 PROCEDURE — 36415 COLL VENOUS BLD VENIPUNCTURE: CPT | Performed by: INTERNAL MEDICINE

## 2025-02-17 PROCEDURE — 70551 MRI BRAIN STEM W/O DYE: CPT | Performed by: RADIOLOGY

## 2025-02-17 PROCEDURE — 76770 US EXAM ABDO BACK WALL COMP: CPT

## 2025-02-17 PROCEDURE — 70547 MR ANGIOGRAPHY NECK W/O DYE: CPT

## 2025-02-17 PROCEDURE — 70551 MRI BRAIN STEM W/O DYE: CPT

## 2025-02-17 PROCEDURE — 80048 BASIC METABOLIC PNL TOTAL CA: CPT | Performed by: NURSE PRACTITIONER

## 2025-02-17 PROCEDURE — 2500000001 HC RX 250 WO HCPCS SELF ADMINISTERED DRUGS (ALT 637 FOR MEDICARE OP): Performed by: INTERNAL MEDICINE

## 2025-02-17 PROCEDURE — 2500000001 HC RX 250 WO HCPCS SELF ADMINISTERED DRUGS (ALT 637 FOR MEDICARE OP): Performed by: NURSE PRACTITIONER

## 2025-02-17 PROCEDURE — 2500000004 HC RX 250 GENERAL PHARMACY W/ HCPCS (ALT 636 FOR OP/ED): Performed by: EMERGENCY MEDICINE

## 2025-02-17 PROCEDURE — 85027 COMPLETE CBC AUTOMATED: CPT | Performed by: INTERNAL MEDICINE

## 2025-02-17 PROCEDURE — 99232 SBSQ HOSP IP/OBS MODERATE 35: CPT | Performed by: NURSE PRACTITIONER

## 2025-02-17 PROCEDURE — 2500000004 HC RX 250 GENERAL PHARMACY W/ HCPCS (ALT 636 FOR OP/ED): Performed by: INTERNAL MEDICINE

## 2025-02-17 PROCEDURE — 99221 1ST HOSP IP/OBS SF/LOW 40: CPT | Performed by: UROLOGY

## 2025-02-17 PROCEDURE — 76770 US EXAM ABDO BACK WALL COMP: CPT | Mod: FOREIGN READ | Performed by: RADIOLOGY

## 2025-02-17 PROCEDURE — 2500000002 HC RX 250 W HCPCS SELF ADMINISTERED DRUGS (ALT 637 FOR MEDICARE OP, ALT 636 FOR OP/ED): Performed by: INTERNAL MEDICINE

## 2025-02-17 PROCEDURE — 51703 INSERT BLADDER CATH COMPLEX: CPT | Performed by: UROLOGY

## 2025-02-17 PROCEDURE — 1100000001 HC PRIVATE ROOM DAILY

## 2025-02-17 PROCEDURE — 99223 1ST HOSP IP/OBS HIGH 75: CPT | Performed by: INTERNAL MEDICINE

## 2025-02-17 PROCEDURE — 82436 ASSAY OF URINE CHLORIDE: CPT | Performed by: INTERNAL MEDICINE

## 2025-02-17 PROCEDURE — 0T9B80Z DRAINAGE OF BLADDER WITH DRAINAGE DEVICE, VIA NATURAL OR ARTIFICIAL OPENING ENDOSCOPIC: ICD-10-PCS | Performed by: UROLOGY

## 2025-02-17 RX ORDER — DEXTROSE MONOHYDRATE AND SODIUM CHLORIDE 5; .225 G/100ML; G/100ML
75 INJECTION, SOLUTION INTRAVENOUS CONTINUOUS
Status: DISCONTINUED | OUTPATIENT
Start: 2025-02-17 | End: 2025-02-17

## 2025-02-17 RX ORDER — CARBIDOPA AND LEVODOPA 25; 100 MG/1; MG/1
3 TABLET ORAL 3 TIMES DAILY
Status: DISCONTINUED | OUTPATIENT
Start: 2025-02-17 | End: 2025-02-21 | Stop reason: HOSPADM

## 2025-02-17 RX ORDER — DEXTROSE MONOHYDRATE 50 MG/ML
75 INJECTION, SOLUTION INTRAVENOUS CONTINUOUS
Status: ACTIVE | OUTPATIENT
Start: 2025-02-17 | End: 2025-02-18

## 2025-02-17 RX ORDER — LIDOCAINE HYDROCHLORIDE 20 MG/ML
1 JELLY TOPICAL ONCE
Status: DISCONTINUED | OUTPATIENT
Start: 2025-02-17 | End: 2025-02-17

## 2025-02-17 RX ORDER — SENNOSIDES 8.8 MG/5ML
5 LIQUID ORAL 2 TIMES DAILY
Status: DISCONTINUED | OUTPATIENT
Start: 2025-02-17 | End: 2025-02-21 | Stop reason: HOSPADM

## 2025-02-17 RX ORDER — BISACODYL 10 MG/1
10 SUPPOSITORY RECTAL DAILY
Status: DISCONTINUED | OUTPATIENT
Start: 2025-02-17 | End: 2025-02-18

## 2025-02-17 RX ORDER — POTASSIUM CHLORIDE 1.5 G/1.58G
20 POWDER, FOR SOLUTION ORAL ONCE
Status: COMPLETED | OUTPATIENT
Start: 2025-02-17 | End: 2025-02-17

## 2025-02-17 RX ADMIN — DEXTROSE AND SODIUM CHLORIDE 125 ML/HR: 5; 450 INJECTION, SOLUTION INTRAVENOUS at 06:18

## 2025-02-17 RX ADMIN — DEXTROSE MONOHYDRATE 75 ML/HR: 50 INJECTION, SOLUTION INTRAVENOUS at 08:36

## 2025-02-17 RX ADMIN — ENOXAPARIN SODIUM 40 MG: 100 INJECTION SUBCUTANEOUS at 15:26

## 2025-02-17 RX ADMIN — MEMANTINE 5 MG: 5 TABLET ORAL at 20:59

## 2025-02-17 RX ADMIN — SENNOSIDES 5 ML: 8.8 LIQUID ORAL at 21:00

## 2025-02-17 RX ADMIN — TAMSULOSIN HYDROCHLORIDE 0.4 MG: 0.4 CAPSULE ORAL at 20:59

## 2025-02-17 RX ADMIN — CARBIDOPA AND LEVODOPA 3 TABLET: 25; 100 TABLET ORAL at 20:59

## 2025-02-17 RX ADMIN — PANTOPRAZOLE SODIUM 40 MG: 40 INJECTION, POWDER, FOR SOLUTION INTRAVENOUS at 06:13

## 2025-02-17 RX ADMIN — Medication 3 MG: at 21:00

## 2025-02-17 RX ADMIN — POTASSIUM CHLORIDE 20 MEQ: 1.5 POWDER, FOR SOLUTION ORAL at 18:15

## 2025-02-17 RX ADMIN — LORAZEPAM 1 MG: 2 INJECTION INTRAMUSCULAR; INTRAVENOUS at 02:00

## 2025-02-17 RX ADMIN — CARBIDOPA AND LEVODOPA 3 TABLET: 25; 100 TABLET ORAL at 15:26

## 2025-02-17 RX ADMIN — ACETAMINOPHEN 650 MG: 650 SOLUTION ORAL at 21:00

## 2025-02-17 RX ADMIN — BISACODYL 10 MG: 10 SUPPOSITORY RECTAL at 18:15

## 2025-02-17 RX ADMIN — BUDESONIDE 0.5 MG: 0.5 INHALANT RESPIRATORY (INHALATION) at 08:21

## 2025-02-17 RX ADMIN — BUDESONIDE 0.5 MG: 0.5 INHALANT RESPIRATORY (INHALATION) at 21:06

## 2025-02-17 ASSESSMENT — COGNITIVE AND FUNCTIONAL STATUS - GENERAL
MOBILITY SCORE: 6
DRESSING REGULAR UPPER BODY CLOTHING: TOTAL
EATING MEALS: TOTAL
TURNING FROM BACK TO SIDE WHILE IN FLAT BAD: TOTAL
DAILY ACTIVITIY SCORE: 6
DRESSING REGULAR LOWER BODY CLOTHING: TOTAL
WALKING IN HOSPITAL ROOM: TOTAL
PERSONAL GROOMING: TOTAL
CLIMB 3 TO 5 STEPS WITH RAILING: TOTAL
TOILETING: TOTAL
MOVING FROM LYING ON BACK TO SITTING ON SIDE OF FLAT BED WITH BEDRAILS: TOTAL
HELP NEEDED FOR BATHING: TOTAL
STANDING UP FROM CHAIR USING ARMS: TOTAL
MOVING TO AND FROM BED TO CHAIR: TOTAL

## 2025-02-17 ASSESSMENT — PAIN - FUNCTIONAL ASSESSMENT: PAIN_FUNCTIONAL_ASSESSMENT: 0-10

## 2025-02-17 ASSESSMENT — PAIN SCALES - GENERAL: PAINLEVEL_OUTOF10: 0 - NO PAIN

## 2025-02-17 ASSESSMENT — ACTIVITIES OF DAILY LIVING (ADL): LACK_OF_TRANSPORTATION: PATIENT UNABLE TO ANSWER

## 2025-02-17 NOTE — ED NOTES
General , Christiano handed this RN d5% 0.2% NS as ordered. Informed by Christiano that this fluid  2024. Per Christiano, it is safe to hang this fluid even with it being . Pharmacy called, inpatient pharmacist stated that it is not appropriate to hang  bag.      Adriana Grant RN  25 0717       Adriana Grant RN  25 0717       Adriana Grant RN  25 0722

## 2025-02-17 NOTE — ED NOTES
This RN contacted pharmacy, nurse supervisor, and general supply. Informed by general supply that D5 0.2% is not stocked here. Dr. Booker messaged as d5 0.2% NS is not available. awaiting response.      Adriana Grant RN  02/17/25 0656

## 2025-02-17 NOTE — PROGRESS NOTES
02/17/25 0710   [facility] Disability Status   Are you deaf or do you have serious difficulty hearing? N   Are you blind or do you have serious difficulty seeing, even when wearing glasses? N   Because of a physical, mental, or emotional condition, do you have serious difficulty concentrating, remembering, or making decisions? (5 years old or older) Y  (dementia, Parkinson's)   Do you have serious difficulty walking or climbing stairs? Y   Do you have serious difficulty dressing or bathing? Y   Because of a physical, mental, or emotional condition, do you have serious difficulty doing errands alone such as visiting the doctor? Y

## 2025-02-17 NOTE — ED NOTES
Dr. Booker made aware of critical lab call. Pts sodium 160     Adriana Grant, NOE  02/17/25 1944

## 2025-02-17 NOTE — CONSULTS
"Reason For Consult  Hypernatremia    History Of Present Illness  Flakito Mcfarlane is a 85 y.o. male presenting with altered mental status and left ischial droop.  Patient has history of hypertension, Parkinson disease was sent to emergency department at [facility] for altered mental status, patient has no Allison no NG tube feeding usually he is incontinent but labs noted that he has hyponatremia no history of falls, fever or chills, nausea nausea vomiting or diarrhea reported history of coughing or seizure disorder or syncope denies hematemesis hematochezia or hemoptysis.  Bladder scan revealed patient has 505 cc urine retention     Past Medical History  He has a past medical history of Parkinson's disease (Multi) (01/18/2022) and Personal history of other diseases of the circulatory system (05/13/2021).    Surgical History  He has a past surgical history that includes Other surgical history (08/13/2019).     Social History  He has no history on file for tobacco use, alcohol use, and drug use.    Family History  No family history on file.     Allergies  Penicillins    Review of Systems  All systems were reviewed     Physical Exam  Appearance: Noninteractive and unarousable with verbal or physical stimulation  Head and ENT: Normocephalic/atraumatic/supple neck/no JVD  Lungs; CTA  Heart: RRR  Abdomen; soft no tenderness organomegaly  Extremities; no edema  Neurologic: Altered mental status with left ischial droop         I&O 24HR  No intake or output data in the 24 hours ending 02/17/25 1213    Vitals 24HR  Heart Rate:  [49-65]   Temp:  [36.8 °C (98.2 °F)-37.1 °C (98.8 °F)]   Resp:  [14-16]   BP: (111-167)/()   Height:  [175.3 cm (5' 9\")]   Weight:  [73 kg (160 lb 15 oz)]   SpO2:  [93 %-100 %]         Relevant Results  Results from last 7 days   Lab Units 02/17/25  0445 02/16/25  1333   WBC AUTO x10*3/uL 6.0 6.3   HEMOGLOBIN g/dL 10.4* 10.6*   HEMATOCRIT % 36.0* 36.3*   PLATELETS AUTO x10*3/uL 150 151      Results from last " 7 days   Lab Units 02/17/25  0445 02/16/25  1333   SODIUM mmol/L 160* 160*   POTASSIUM mmol/L 4.1 4.1   CHLORIDE mmol/L 125* 125*   CO2 mmol/L 29 30   BUN mg/dL 27* 37*   CREATININE mg/dL 1.28 1.72*   GLUCOSE mg/dL 102* 92   CALCIUM mg/dL 8.1* 8.4*    US renal complete    Result Date: 2/17/2025  STUDY: Renal and Bladder Ultrasound; 2/17/2025 at 10:38 am. INDICATION:  Acute kidney injury with hypernatremia.  COMPARISON:  None available. ACCESSION NUMBER(S): [id number] ORDERING CLINICIAN: HUANG JONES TECHNIQUE: Ultrasound of the Kidneys and Bladder.  FINDINGS: RIGHT KIDNEY: The right kidney measures 9.9 cm in length.  Renal cortical echotexture is normal.  There is no hydronephrosis.  There are no stones.  There are no cysts. LEFT KIDNEY: The left kidney measures 9.3 cm in length.  Renal cortical echotexture is normal.  There is no hydronephrosis.  There are no stones.  There is a simple cyst within the mid portion measuring 4.5 x 5.2 x 4.1 cm.  BLADDER: The urinary bladder is anechoic. The distended bladder shows no evidence of wall thickening.  The distended bladder volume is 505 cc. Bilateral ureteral jets are visualized.   Incidental finding: There are multiple stones within the gallbladder. Negative Pendleton's sign.    No hydronephrosis bilaterally. Right renal cyst. Incidental finding: There are multiple stones within the gallbladder. Signed by Christiano Loredo MD    ECG 12 lead    Result Date: 2/17/2025  Accelerated Junctional rhythm Inferior infarct , age undetermined Abnormal ECG When compared with ECG of 19-DEC-2024 17:27, (unconfirmed) Junctional rhythm has replaced Sinus rhythm Questionable change in QRS duration Criteria for Anterior infarct are no longer Present Inferior infarct is now Present    XR chest 1 view    Result Date: 2/16/2025  STUDY: Chest Radiograph;  2/16/2025 1:46 PM INDICATION: Weakness. COMPARISON: CXR 12/19/2024. ACCESSION NUMBER(S): [id number] ORDERING CLINICIAN: DEON JHAVERI  [facility] TECHNIQUE:  Frontal chest was obtained at 13:46 hours. FINDINGS: CARDIOMEDIASTINAL SILHOUETTE: Cardiomediastinal silhouette is normal in size and configuration. Thoracic aorta is minimally tortuous.  LUNGS: Lungs are clear. There are no pleural effusions. There is no pneumothorax.  ABDOMEN: No remarkable upper abdominal findings.  BONES: No acute osseous changes.    Stable exam. No detectable active cardiopulmonary disease. Signed by Seth Moise MD    CT brain attack head wo IV contrast    Addendum Date: 2/16/2025    Interpreted By:  Rodney Helms, ADDENDUM: Rodney Helms discussed the significance and urgency of this critical finding by secure chat with  DEON PETERSEN on 2/16/2025 at 1:29 pm.  (**-RCF-**) Findings:  See findings.     Signed by: Rodney Helms 2/16/2025 1:30 PM   -------- ORIGINAL REPORT -------- Dictation workstation:   [id number]    Result Date: 2/16/2025  Interpreted By:  Rodney Helms, STUDY: CT BRAIN ATTACK HEAD WO IV CONTRAST;  2/16/2025 12:58 pm   INDICATION: Signs/Symptoms:stroke.     COMPARISON: None.   ACCESSION NUMBER(S): [id number]   ORDERING CLINICIAN: DEON PETERSEN   TECHNIQUE: Noncontrast axial CT scan of head was performed. Angled reformats in brain and bone windows were generated. The images were reviewed in bone, brain, blood and soft tissue windows. December 19, 2024 head CT   FINDINGS: The ventricles, cisterns and sulci are prominent, consistent with mild diffuse volume loss. There are areas of nonspecific white matter hypodensity, which are probably age-related or microvascular in nature.   Gray-white differentiation is intact and there is no evidence of acute cortical infarct. No mass, mass effect or midline shift is seen. There is no evidence of hemorrhage.   There is mild mucosal thickening paranasal sinuses a newly seen mild fluid and/or secretions left maxillary sinus and left sphenoid sinus.       No evidence of acute cortical infarct or intracranial  hemorrhage.   Atrophy and nonspecific low-density white matter changes.   Mucosal thickening paranasal sinuses with mild newly seen secretions and/or fluid left maxillary and left sphenoid sinus. Correlate with symptomatology.   MACRO: None   Signed by: Rodney Helms 2/16/2025 1:21 PM Dictation workstation:   [id number]       Assessment/Plan   1.  TC on top of CKD, gradual resolving  No history of obstruction or renal calculi noted by ultrasound  2.  Hypernatremia due to lack of free water intake, started on D5W at 75 cc/h  3.  Urinary bladder retention of 505 ml,  Replace for Allison placement  4.  Mental status right CVA will follow with the medical team    CBC and BMP daily review all the labs examined patient and all consultants input    Assessment & Plan  Hypernatremia      I spent 54 minutes in the professional and overall care of this patient.      Bobo Wang MD

## 2025-02-17 NOTE — CONSULTS
Inpatient consult to Palliative Care  Consult performed by: Matthew Steel MD  Consult ordered by: Dimple Washington, APRN-CNP        Reason For Consult  Reason for Consult: communication / medical decision making, symptom management, and patient/family support     History Of Present Illness  Flakito Mcfarlane is a 85 y.o. male with past medical history of Parkinson's disease, dementia, HTN, presenting with LT facial palsy, AMS.  Neurology was consulted, no acute on CT head but recommended MRI to r/o CVA. They view his facial palsy as likely Bell's palsy. He is severely hypernatremic on presentation. He is retaining some urine based on 475 on bladder scan per nursing. Unable to place summers on 3 attempts so far. He is due to have one placed today.   Friend Fantasma is HCPOA. She told other providers he would always want to be Full Code. He does not have capacity now. She would like to respect his wishes but would want his sisters to be involved in care decisions r/t to EOL. Palliative was consulted for assistance with GOC.   SLP currently recommending Pureed and thin liquids, aspiration precautions.   We do have ACP docs which give HCPOA and do not mention avoidance of artifical feeding when in terminal state.            Symptoms (0 - 10, Best to Worst)  [place] Symptom Assessment System  0-10 (Numeric) Pain Score: 0 - No pain  He denies pain for me. Has no other complaints though I question how much he understands.     Personal/Social History    He has no history on file for tobacco use, alcohol use, and drug use.    Functional Status        Past Medical History  He has a past medical history of Parkinson's disease (Multi) (01/18/2022) and Personal history of other diseases of the circulatory system (05/13/2021).    Surgical History  He has a past surgical history that includes Other surgical history (08/13/2019).     Family History  No family history on file.  Allergies  Penicillins    Review of Systems     Physical  "Exam  Constitutional:       Comments: Tired appearing. Wakes to voice. Answers question though only w/ single words. Reliability is unclear.    HENT:      Head: Atraumatic.      Mouth/Throat:      Mouth: Mucous membranes are moist.   Cardiovascular:      Rate and Rhythm: Normal rate and regular rhythm.   Pulmonary:      Effort: Pulmonary effort is normal.      Breath sounds: Normal breath sounds.   Abdominal:      General: Abdomen is flat. Bowel sounds are normal. There is no distension.      Tenderness: There is no abdominal tenderness. There is guarding.   Musculoskeletal:         General: No swelling.   Skin:     General: Skin is warm and dry.   Neurological:      Mental Status: He is disoriented.      Comments: LT facial droop. Cooperates with simple commands.          Last Recorded Vitals  Blood pressure 111/64, pulse 66, temperature 37.1 °C (98.7 °F), temperature source Temporal, resp. rate 16, height 1.753 m (5' 9\"), weight 73 kg (160 lb 15 oz), SpO2 91%.      Wt Readings from Last 8 Encounters:   02/16/25 73 kg (160 lb 15 oz)   01/18/22 66.7 kg (147 lb)   12/08/21 70.4 kg (155 lb 3 oz)   09/22/21 72.6 kg (160 lb)   07/07/21 75.8 kg (167 lb)   05/11/21 72.6 kg (160 lb)   01/13/21 78.5 kg (173 lb)   12/31/20 80.7 kg (178 lb)   2/17/2025 Reweigh on bed, 71.5kg.     Relevant Results  Results for orders placed or performed during the hospital encounter of 02/16/25 (from the past 24 hours)   Urinalysis with Reflex Culture and Microscopic   Result Value Ref Range    Color, Urine Light-Yellow Light-Yellow, Yellow, Dark-Yellow    Appearance, Urine Clear Clear    Specific Gravity, Urine 1.019 1.005 - 1.035    pH, Urine 5.0 5.0, 5.5, 6.0, 6.5, 7.0, 7.5, 8.0    Protein, Urine NEGATIVE NEGATIVE, 10 (TRACE), 20 (TRACE) mg/dL    Glucose, Urine Normal Normal mg/dL    Blood, Urine 1.0 (3+) (A) NEGATIVE mg/dL    Ketones, Urine NEGATIVE NEGATIVE mg/dL    Bilirubin, Urine NEGATIVE NEGATIVE mg/dL    Urobilinogen, Urine Normal " Normal mg/dL    Nitrite, Urine NEGATIVE NEGATIVE    Leukocyte Esterase, Urine NEGATIVE NEGATIVE   Urinalysis Microscopic   Result Value Ref Range    WBC, Urine 1-5 1-5, NONE /HPF    RBC, Urine >20 (A) NONE, 1-2, 3-5 /HPF    Mucus, Urine FEW Reference range not established. /LPF    Hyaline Casts, Urine 2+ (A) NONE /LPF   CBC   Result Value Ref Range    WBC 6.0 4.4 - 11.3 x10*3/uL    nRBC 0.0 0.0 - 0.0 /100 WBCs    RBC 3.72 (L) 4.50 - 5.90 x10*6/uL    Hemoglobin 10.4 (L) 13.5 - 17.5 g/dL    Hematocrit 36.0 (L) 41.0 - 52.0 %    MCV 97 80 - 100 fL    MCH 28.0 26.0 - 34.0 pg    MCHC 28.9 (L) 32.0 - 36.0 g/dL    RDW 14.3 11.5 - 14.5 %    Platelets 150 150 - 450 x10*3/uL   Basic metabolic panel   Result Value Ref Range    Glucose 102 (H) 74 - 99 mg/dL    Sodium 160 (HH) 136 - 145 mmol/L    Potassium 4.1 3.5 - 5.3 mmol/L    Chloride 125 (H) 98 - 107 mmol/L    Bicarbonate 29 21 - 32 mmol/L    Anion Gap 10 10 - 20 mmol/L    Urea Nitrogen 27 (H) 6 - 23 mg/dL    Creatinine 1.28 0.50 - 1.30 mg/dL    eGFR 55 (L) >60 mL/min/1.73m*2    Calcium 8.1 (L) 8.6 - 10.3 mg/dL     US renal complete    Result Date: 2/17/2025  No hydronephrosis bilaterally. Right renal cyst. Incidental finding: There are multiple stones within the gallbladder. Signed by Chirstiano Loredo MD    XR chest 1 view    Result Date: 2/16/2025  Stable exam. No detectable active cardiopulmonary disease. Signed by Seth Moise MD    CT brain attack head wo IV contrast    Addendum Date: 2/16/2025    Interpreted By:  Rodney Helms, ADDENDUM: Rodney Helms discussed the significance and urgency of this critical finding by secure chat with  DEON PETERSEN on 2/16/2025 at 1:29 pm.  (**-RCF-**) Findings:  See findings.     Signed by: Rodney Helms 2/16/2025 1:30 PM   -------- ORIGINAL REPORT -------- Dictation workstation:   [id number]    Result Date: 2/16/2025  No evidence of acute cortical infarct or intracranial hemorrhage.   Atrophy and nonspecific low-density white  matter changes.   Mucosal thickening paranasal sinuses with mild newly seen secretions and/or fluid left maxillary and left sphenoid sinus. Correlate with symptomatology.   MACRO: None   Signed by: Rodney Helms 2/16/2025 1:21 PM Dictation workstation:   [id number]        Assessment/Plan   IMP:  84 yo M with PMH of parkison's disease and dementia presenting with LT face droop and AMS. Neurology is following for Bell's Palsy and to rule out CVA which they think is unlikely but for which patient is set to have an MRI. Nephrology is consulted for severe hypernatremia i/s/o of decreased intake. Palliative consulted for GOC.       Issues:  Bell's Palsy  Hypernatremia  Encephalopathy, metabolic  Hypernatremia, moderate/severe  Chronic anemia  FTT  TC on admission, improved  PCM? Mild  Urinary retention      Plan:  - ACP on file do not specify that patient would not want artificial feeding if in terminal state.   - Per other staff, he wanted heroic measures. Unclear when he voiced these wishes and whether he was aware of his prognosis.   - Called HCPOA but unable to leave message b/c voicemail no set up.   - Asked patient to be reweighed. Last weight does not look accurate based on trend above.   - Unclear if he is having BM. Given his Parkinson's he may have chronic constipation. Will add senna liquid and bisacodyl until nursing documents stools.   - Will continue to follow for GOC.       Patient/proxy preference for information  Prefers full information    Goals of Care  DNR DNI    Is the patient hospice-eligible?   Yes  Was a discussion held re hospice services?   no  Was a decision made re hospice services?  Unknown      I spent 70 minutes in the professional and overall care of this patient.      Matthew Steel MD

## 2025-02-17 NOTE — PROGRESS NOTES
Family interested jn new LTC facility does not fell [facility] can meet patient's needs. Requested referral to be sent to [facility] and choice list was emailed. Spoke with ranjit Carreon.

## 2025-02-17 NOTE — ED NOTES
This RN messaged Dr. Booker about concern of giving pt nightly medications d/t aspiration risk with patient being lethargic and having facial drooping. Awaiting response from MD. Adriana Grant RN  02/16/25 2037

## 2025-02-17 NOTE — PROGRESS NOTES
Transitional Care Coordination Progress Note:  Plan per Medical/Surgical team: treatment of Bell's palsy, encephalopathy & hypernatremia with IV dextrose, renal consult, speech eval pending   Status: Inpatient   Payor source: [facility]  Discharge disposition: [facility]   Potential Barriers:   ADOD: 2/19/2025  BG Tracy RN, BSN Transitional Care Coordinator ED# [phone]      02/17/25 0711   Discharge Planning   Living Arrangements Alone   Support Systems Family members   Assistance Needed IV dextrose, renal consult, speech eval pending   Type of Residence Nursing home/residential care   Do you have animals or pets at home? No   Home or Post Acute Services Post acute facilities (Rehab/SNF/etc)   Type of Post Acute Facility Services Long term care   Expected Discharge Disposition Inter   Does the patient need discharge transport arranged? Yes   RoundTrip coordination needed? Yes   Has discharge transport been arranged? No   Financial Resource Strain   How hard is it for you to pay for the very basics like food, housing, medical care, and heating? Pt Unable   Housing Stability   In the last 12 months, was there a time when you were not able to pay the mortgage or rent on time? Pt Unable   In the past 12 months, how many times have you moved where you were living? 1   At any time in the past 12 months, were you homeless or living in a shelter (including now)? Pt Unable   Transportation Needs   In the past 12 months, has lack of transportation kept you from medical appointments or from getting medications? Pt Unable   In the past 12 months, has lack of transportation kept you from meetings, work, or from getting things needed for daily living? Pt Unable   Patient Choice   Provider Choice list and CMS website (https://medicare.gov/care-compare#search) for post-acute Quality and Resource Measure Data were provided and reviewed with: Family   Patient / Family choosing to utilize agency /  facility established prior to hospitalization Yes   Stroke Family Assessment   Stroke Family Assessment Needed No   Intensity of Service   Intensity of Service 0-30 min

## 2025-02-17 NOTE — ED NOTES
Stefany, RN given hand off. Pts VSS and showing no signs of obvious distress. Oncoming RN aware of fluid ordered and this RNs communication with Dr. Booker and pharmacy.     Adriana Grant RN  02/17/25 [id number]

## 2025-02-17 NOTE — CONSULTS
"Reason For Consult  Urinary retention, inability to pass Allison catheter.    History Of Present Illness  Flakito Mcfarlane is a 85 y.o. male presenting with urinary retention.  He has a history of prostatectomy for prostate cancer.  Multiple attempts were made to pass Allison catheter without success..     Past Medical History  He has a past medical history of Parkinson's disease (Multi) (01/18/2022) and Personal history of other diseases of the circulatory system (05/13/2021).    Surgical History  He has a past surgical history that includes Other surgical history (08/13/2019).     Social History  He has no history on file for tobacco use, alcohol use, and drug use.    Family History  No family history on file.     Allergies  Penicillins    Review of Systems  Pertinent noted     Physical Exam  PROCEDURE NOTE:    PREOPERATIVE DIAGNOSIS:      POSTOPERATIVE DIAGNOSIS:  Same    OPERATION:  Flexible Cystourethroscopy      SURGEON:  Rebekah Ambrosio MD    ANESTHESIA:  2%  lidocaine jelly    COMPLICATIONS:  None    EBL: Minimal    DISPOSITION:  The patient was discharged home after the procedure, per routine.    INDICATIONS: :  Mr. Mcfarlane is a 85 y.o. patient with a history of urinary tension who presents today for Cystoscopy.     The indications, risks and benefits of this procedure were discussed with the patient, consent was obtained prior to the procedure, and to the best of my judgement the patient seemed to understand and agree to the procedure.    PROCEDURE:  The patient  was brought into the procedure suite and informed consent was reviewed and confirmed. Vital signs were obtained prior to the procedure: /63 (BP Location: Left arm)   Pulse 66   Temp 37.1 °C (98.8 °F) (Temporal)   Resp 16   Ht 1.753 m (5' 9.02\")   Wt 70.3 kg (154 lb 14.4 oz)   SpO2 93%   BMI 22.86 kg/m² .  The patient was escorted onto the stretcher, placed supine, prepped with betadine and draped in the usual standard surgical fashion.  Intraurethral " "2% viscous lidocaine jelly was used for local analgesia.  A 16 [place] flexible cystourethroscope was inserted into the urethra.       The penile urethra was  bulbar urethral stricture noted.  Wire was passed into the bladder.  Stricture was dilated using serial dilators to 20 [place] caliber.  20 [place] Ambler tip catheter was placed over wire. .   The patient tolerated the procedure well.  Vitals were stable after the procedure.  The patient was able to void and was discharged home.  Verbal and written Post procedure instructions were reviewed with the patient.    IMPRESSION:  Bulbar urethral stricture    PLAN:  Maintain Allison catheter in place as appropriate per primary team       Last Recorded Vitals  Blood pressure 115/63, pulse 66, temperature 37.1 °C (98.8 °F), temperature source Temporal, resp. rate 16, height 1.753 m (5' 9.02\"), weight 70.3 kg (154 lb 14.4 oz), SpO2 93%.    Relevant Results               Rebekah Ambrosio MD    "

## 2025-02-17 NOTE — PROGRESS NOTES
Speech-Language Pathology    Inpatient Clinical Swallow Evaluation    Patient Name: Flakito Mcfarlane  MRN: [id number]  Today's Date: 2/17/2025   Time Calculation  Start Time: 1047  Stop Time: 1111  Time Calculation (min): 24 min          Current Problem:   1. Hypernatremia        2. Bell's palsy        3. Acute metabolic encephalopathy        Risk for Aspiration: Yes      Diet Recommendations:    Solid Diet Recommendations:   Pureed/extremely thick  (IDDSI Level 4)    Liquid Diet Recommendations:  Thin (IDDSI Level 0)    Compensatory Swallowing Strategies:   -Upright 90 degrees as possible for all oral intake and medication passes  -One to one assist with meals  -Single sips  -Small bites/sips  -Eat/feed slowly  -Check for pocketing of food      Medication Administration Recommendations:   Whole, one at at time, with a sip of liquid    Follow up treatments: Diet tolerance monitoring, Patient/family education        Assessment:  Assessment  Prognosis: Good  Medical Staff Made Aware: Yes      Plan:  Plan  Inpatient/Swing Bed or Outpatient: Inpatient  Treatment/Interventions: Assess diet tolerance, Diet recommendations, Patient/family education  SLP Plan: Skilled SLP  SLP Frequency: 2x per week  Duration: 2 weeks  SLP Discharge Recommendations: Skilled nursing facility placement  Diet Recommendations: Solid, Liquid  Solid Consistency: Pureed/extremely thick (IDDSI Level 4)  Liquid Consistency: Thin (IDDSI Level 0)  Next Treatment Priority: 2/19/25  Discussed POC: Patient, Caregiver/family  Discussed Risks/Benefits: Yes, Patient, Caregiver/Family  Patient/Caregiver Agreeable: Yes  SLP - OK to Discharge: Yes      Subjective   Current Problem:  Patient was admitted due to increased L facial droop, concern for acute CVA. The patient has a history of Parkinson's disease, dementia, COPD. The swallow evaluation was conducted to identify current swallowing skills and to determine safety of PO intake.  The patient's niece and POA was  present for the evaluation with another family member joining while the assessment was being conducted.         General Visit Information:  General Information  Living Environment: Nursing home (skilled/long-term)  Ordering Physician: Jhony  Reason for Referral: dysphagia assessment  Past Medical History Relevant to Rehab: dementia, Parkinson's disease, HTN, COPD  Patient Seen During This Visit: Yes  Prior to Session Communication: Bedside nurse  Date of Onset: 02/16/25  Date of Order: 02/16/25  Base[place] Diet: regular/thin  Current Diet : NPO  Dysphagia Diagnosis: Moderate oral stage dysphagia      Objective   Patient with prominent L labial droop. Family reported that patient has had mild droop PTA and would drool to the point he needed a bib at the [facility]. No drooling noted this date. Patient with dry oral cavity, yellow crusting on lips.  Thorough oral care provided prior to PO trials. Patient initially difficult to arouse, became more alert as evaluation progressed.   Ice chips, water via teaspoon, cup and straw were presented. SLP then provided pureed and minced solids (jello).   Patient able to complete the three ounce swallow challenge without overt signs of penetration or aspiration. Vocal quality deep but clear. No cough, throat clear noted. Swallows were triggered promptly with adequate laryngeal elevation. Pureed solids were consumed with no oral delays or stasis. Minced solids were masticated for an extended amount of time but with no oral residue.  SLP recommendations are listed above.   Results and recommendations shared with patient and his family. All verbalized comprehension of information presented.       Short Term Goals: 2/17-3/3/25  -Patient will tolerate recommended diet without observed clinical signs of aspiration   -Patient will progress to advanced diet  -Family will demonstrate appropriate strategies for swallowing safety    Long Term Goal 2/17-3/03/25  -Patient will resume least restrictive  diet without overt signs of penetration or aspiration  with 95% of meals       Pain:  Pain Assessment  Pain Assessment: 0-10  0-10 (Numeric) Pain Score: 0 - No pain      Oral/Motor Assessment:  Oral/Motor Assessment  Oral Hygiene: dried secretions on lips and tongue  Dentition: Edentulous  Oral Motor: Impaired Function  Facial Symmetry: Left droop  Labial Agility: Reduced  Labial Deviation Left: Reduced  Labial ROM: Reduced left  Labial Strength: Reduced  Labial Symmetry: Abnormal symmetry left  Lingual Agility: Reduced  Lingual ROM: Reduced right, Reduced left  Lingual Strength: Reduced      Clinical Observations:  Clinical Observations  Patient Positioning: Upright in Bed  Management of Oral Secretions: Adequate, Other (Comment)  Was The 3 oz Swallow Protocol Completed: Yes  Prolonged Oral Manipulation: Minced & Moist/Ground (IDDSI Level 5)  Impaired Mastication: Minced & Moist/Ground (IDDSI Level 5)

## 2025-02-17 NOTE — PROGRESS NOTES
Flakito Mcfarlane is a 85 y.o. male     Patient quite lethargic when seen this morning  Sodium was low at 160  We monitored start D50.2 But apparently also D5.2 normal saline are   Therefore we will discharge her D5 water and monitor sodium carefully  Speech therapy consulted and they have made the recommendations    Review of Systems           Vitals:    25 1543   BP: 115/63   Pulse:    Resp:    Temp: 37.1 °C (98.8 °F)   SpO2: 93%        Scheduled medications  bisacodyl, 10 mg, rectal, Daily  budesonide, 0.5 mg, nebulization, BID  carbidopa-levodopa, 3 tablet, oral, TID  enoxaparin, 40 mg, subcutaneous, q24h  memantine, 5 mg, oral, BID  pantoprazole, 40 mg, oral, Daily before breakfast   Or  pantoprazole, 40 mg, intravenous, Daily before breakfast  senna, 5 mL, oral, BID  tamsulosin, 0.4 mg, oral, Nightly      Continuous medications  dextrose 5%, 75 mL/hr, Last Rate: 75 mL/hr (25 0836)      PRN medications  PRN medications: acetaminophen **OR** acetaminophen **OR** acetaminophen, albuterol, guaiFENesin, LORazepam, melatonin, ondansetron **OR** ondansetron, polyethylene glycol    Lab Review   Results from last 7 days   Lab Units 25  0445 25  1333   WBC AUTO x10*3/uL 6.0 6.3   HEMOGLOBIN g/dL 10.4* 10.6*   HEMATOCRIT % 36.0* 36.3*   PLATELETS AUTO x10*3/uL 150 151     Results from last 7 days   Lab Units 25  1418 25  0445 25  1333   SODIUM mmol/L 157* 160* 160*   POTASSIUM mmol/L 3.4* 4.1 4.1   CHLORIDE mmol/L 121* 125* 125*   CO2 mmol/L 30 29 30   BUN mg/dL 22 27* 37*   CREATININE mg/dL 1.13 1.28 1.72*   CALCIUM mg/dL 8.0* 8.1* 8.4*   PROTEIN TOTAL g/dL  --   --  5.9*   BILIRUBIN TOTAL mg/dL  --   --  0.7   ALK PHOS U/L  --   --  62   ALT U/L  --   --  <3*   AST U/L  --   --  13   GLUCOSE mg/dL 97 102* 92     Results from last 7 days   Lab Units 25  1333   TROPHS ng/L 10        US renal complete   Final Result   No hydronephrosis bilaterally.    Right renal cyst.    Incidental finding: There are multiple stones within the gallbladder.   Signed by Christiano Loredo MD      XR chest 1 view   Final Result   Stable exam. No detectable active cardiopulmonary disease.   Signed by Seth Moise MD      CT brain attack head wo IV contrast   Final Result   Addendum (preliminary) 1 of 1   Interpreted By:  Rodney Helms,    ADDENDUM:   Rodney Helms discussed the significance and urgency of this   critical finding by secure chat with  DEON TRINITY on 2/16/2025 at   1:29 pm.  (**-RCF-**) Findings:  See findings.             Signed by: Rodney Helms 2/16/2025 1:30 PM        -------- ORIGINAL REPORT --------   Dictation workstation:   [id number]      Final   No evidence of acute cortical infarct or intracranial hemorrhage.        Atrophy and nonspecific low-density white matter changes.        Mucosal thickening paranasal sinuses with mild newly seen secretions   and/or fluid left maxillary and left sphenoid sinus. Correlate with   symptomatology.        MACRO:   None        Signed by: Rodney Helms 2/16/2025 1:21 PM   Dictation workstation:   [id number]      MR brain wo IV contrast    (Results Pending)   MR angio head wo IV contrast    (Results Pending)   MR angio neck wo IV contrast    (Results Pending)         Physical Exam    Constitutional   General appearance: Bar check  Pulmonary   Respiratory assessment: No respiratory distress, normal respiratory rhythm and effort.    Auscultation of Lungs: Clear bilateral breath sounds.   Cardiovascular   Auscultation of heart: Apical pulse normal, heart rate and rhythm normal, normal S1 and S2, no murmurs and no pericardial rub.    Exam for edema: No peripheral edema.   Abdomen   Abdominal Exam: No bruits, normal bowel sounds, soft, non-tender, no abdominal mass palpated.    Liver and Spleen exam: No hepato-splenomegaly.   Musculoskeletal     Neurologic   Lethargic        Assessment/Plan       #Hypernatremia  #Encephalopathy  #Dehydration  #Bell's palsy  Continue D5 water  Monitor sodium regularly to avoid rapid drop in sodium    #Parkinson disease with dementia  Poor prognosis  We consulted palliative care    #Urine retention  Allison catheter placed  Monitor for kidney infections

## 2025-02-17 NOTE — ED NOTES
Per Dr. Booker nightly PO medications to be held d/t aspiration risk     Adriana Grant, NOE  02/17/25 4086

## 2025-02-17 NOTE — ED NOTES
Report given to NOE [facility]. No significant detrimental changes prior to handoff. Neuro unchanged, skin/respiratory grossly WDL.      Neel Stewart RN  02/16/25 1944

## 2025-02-17 NOTE — CONSULTS
Nutrition Consult Note  Nutrition Assessment      Reason for Assessment: Provider consult order    Flakito Mcfarlane is a 85 y.o. year old male patient with Bell's palsy [G51.0]  Hypernatremia [E87.0]  Acute metabolic encephalopathy [G93.41]     referred for NA .     Chart reviewed and pt visited.  Past Medical History:   Diagnosis Date    Parkinson's disease (Multi) 01/18/2022    Parkinson disease, symptomatic    Personal history of other diseases of the circulatory system 05/13/2021    History of hypertension     Per chart review:  -Admitted with left facial droop (Bell's Palsey)  -Bedbound; unable to walk   -TC  -SLP seen; rec pureed with thin (spoon fed), small bites/sips. Discussed ONS with SLP. Appropriate for Ensure    Pt not waking to name.    Scheduled medications  bisacodyl, 10 mg, rectal, Daily  budesonide, 0.5 mg, nebulization, BID  carbidopa-levodopa, 3 tablet, oral, TID  enoxaparin, 40 mg, subcutaneous, q24h  memantine, 5 mg, oral, BID  pantoprazole, 40 mg, oral, Daily before breakfast   Or  pantoprazole, 40 mg, intravenous, Daily before breakfast  senna, 5 mL, oral, BID  tamsulosin, 0.4 mg, oral, Nightly      Continuous medications  dextrose 5%, 75 mL/hr, Last Rate: 75 mL/hr (02/17/25 0836)      PRN medications  PRN medications: acetaminophen **OR** acetaminophen **OR** acetaminophen, albuterol, guaiFENesin, LORazepam, melatonin, ondansetron **OR** ondansetron, polyethylene glycol    Nutrition Significant Labs:  BMP Trend:   Results from last 7 days   Lab Units 02/17/25  1418 02/17/25  0445 02/16/25  1333   GLUCOSE mg/dL 97 102* 92   CALCIUM mg/dL 8.0* 8.1* 8.4*   SODIUM mmol/L 157* 160* 160*   POTASSIUM mmol/L 3.4* 4.1 4.1   CO2 mmol/L 30 29 30   CHLORIDE mmol/L 121* 125* 125*   BUN mg/dL 22 27* 37*   CREATININE mg/dL 1.13 1.28 1.72*    , BG POCT trend:   Results from last 7 days   Lab Units 02/16/25  1302   POCT GLUCOSE mg/dL 79    , Liver Function Trend:   Results from last 7 days   Lab Units  "02/16/25  1333   ALK PHOS U/L 62   AST U/L 13   ALT U/L <3*   BILIRUBIN TOTAL mg/dL 0.7    , Renal Lab Trend:   Results from last 7 days   Lab Units 02/17/25  1418 02/17/25  0445 02/16/25  1333   POTASSIUM mmol/L 3.4* 4.1 4.1   SODIUM mmol/L 157* 160* 160*   EGFR mL/min/1.73m*2 63 55* 38*   BUN mg/dL 22 27* 37*   CREATININE mg/dL 1.13 1.28 1.72*    , Lipid Panel:   Lab Results   Component Value Date    CHOL 123 09/22/2021    HDL 42.7 09/22/2021    CHHDL 2.9 09/22/2021    LDLF 71 09/22/2021    VLDL 9 09/22/2021    TRIG 47 09/22/2021    , Vit D:   Lab Results   Component Value Date    VITD25 58 05/11/2021    , Vit B12:   Lab Results   Component Value Date    JXUXFIKG32 398 12/31/2020        Dietary Orders (From admission, onward)       Start     Ordered    02/17/25 1554  Oral nutritional supplements  Until discontinued        Question Answer Comment   Deliver with Dinner    Select supplement: Ensure Plus High Protein        02/17/25 1554    02/17/25 1115  Adult diet Regular; Pureed 4; Thin 0; Spoon feed only  Diet effective now        Comments: Up at 90 degrees  Check oral cavity for stasis  Single small bites  Single small sips  Slow rate   Question Answer Comment   Diet type Regular    Texture Pureed 4    Fluid consistency Thin 0    Select tray type: Spoon feed only        02/17/25 1116                  History:  Food and Nutrient History: Unknown- pt sleeping at time of visit, not roused by name.    Anthropometrics:  Height: 175.3 cm (5' 9.02\")  Weight: 70.3 kg (154 lb 14.4 oz)  BMI (Calculated): 22.86    Weight Change: -1.73    Wt Readings from Last 2 Encounters:   02/17/25 70.3 kg (154 lb 14.4 oz)   01/18/22 66.7 kg (147 lb)     Significant Weight Loss: No       IBW/kg (Dietitian Calculated): 72.7 kg  Percent of IBW: 97 %       Energy Needs:  Height: 175.3 cm (5' 9.02\")  Temp: 37.1 °C (98.8 °F)    Total Energy Estimated Needs in 24 hours (kCal): 1775 kCal  Energy Estimated Needs per kg Body Weight in 24 hours " (kCal/kg): 2100 kCal/kg  Method for Estimating Needs: 25-30kcal/kg    Total Protein Estimated Needs in 24 Hours (g): 70 g  Protein Estimated Needs per kg Body Weight in 24 Hours (g/kg): 105 g/kg  Method for Estimating 24 Hour Protein Needs: 1.0-1.5g/kg    Method for Estimating 24 Hour Fluid Needs: 1mL/kcal or MD recommendations       Nutrition Focused Physical Findings:  Orbital Fat Pads: Well nourished (slightly bulging fat pads)  Buccal Fat Pads: Well nourished (full, rounded cheeks)    Temporalis: Severe (hollowed scooping depression)  Pectoralis (Clavicular Region): Severe (protruding prominent clavicle)    -Muscle atrophy may be d/t advanced age.    Edema: none       Skin: Negative  Mouth Findings: Dysphagia       Nutrition Diagnosis        Patient has Nutrition Diagnosis: Yes  Nutrition Diagnosis 1: Increased nutrient needs  Diagnosis Status (1): New  Related to (1): acute illness  As Evidenced by (1): TC       Nutrition Interventions/Recommendations      Food and/or Nutrient Delivery Interventions  Meals and Snacks: General healthful diet     Medical Food Supplement: Commercial beverage medical food supplement therapy  Goal: Ensure nutritional supplements    Feeding Assistance: Feeding position management, Meal set up management, Other (Comment)    Collaboration and Referral of Nutrition Care: Collaboration by nutrition professional with other providers  Coordination of Care with Providers: SLP    Nutrition Monitoring and Evaluation   Food and Nutrient Related History  Estimated Energy Intake: Energy intake greater or equal to 75% of estimated energy needs    Fluid Intake: Estimated fluid intake    Intake / Amount of food: Meets > 75% estimated energy needs, Consumes at least 75% or more of meals/snacks/supplements    Anthropometrics: Body Composition/Growth/Weight History  Body Weight: Body weight - Maintain stable weight    Biochemical Data, Medical Tests and Procedures  Electrolyte and Renal Panel: Other  (Comment), Calcium, ionized, Calcium, serum, Sodium, Potassium, Chloride  Criteria: As clinically indicated    Gastrointestinal Profile: Other (Comment), Alanine aminotransferase (ALT)  Criteria: As clinically indicated    Glucose/Endocrine Profile: Glucose within normal limits ( mg/dL)  Criteria: As clinically indicated       Vitamin Profile: Other (Comment)  Criteria: As clinically indicated    Nutrition Focused Physical Findings     Digestive System Finding: Constipation, Vomiting, Nausea, Diarrhea    Muscle Finding: Muscle atrophy       Time Spent (min): 50 minutes  Last Date of Nutrition Visit: 02/17/25  Nutrition Follow-Up Needed?: Dietitian to reassess per policy  Follow up Comment: AUNG Brennan

## 2025-02-17 NOTE — PROGRESS NOTES
"Flakito Mcfarlane is a 85 y.o. male on day 1 of admission presenting with Hypernatremia.    Subjective   Seen this morning.  Patient is lethargic. Opens eyes to sternal rub.  Able to tell me his name. When asked where we are, responds \"I don't know\", then falls back to sleep.  Squeezes my hands when asked.     Niece and HPOA, Fantasma, is at the bedside.  Reports that he is normally more alert and mobile, has had many falls at his nursing facility recently.  She is concerned about the care he is receiving there and is interested in speaking with SW about changing facilities.    We also discussed code status at this time.  Fantasma states that he has been been adamant in the past about being full code.  For further discussions she would like to loop in his daughters.        Objective     Physical Exam    Constitutional: NAD, pt lethargic, responds to sternal rub, opens eyes briefly- answers some questions before falling asleep   Eyes: no icterus  ENMT: mucous membranes dry, no lesions seen  Head/Neck: Neck supple  Respiratory/Thorax: CTA bilaterally, non-labored breathing, no cough, on RA  Cardiovascular: Regular rate and rhythm, no murmurs heard  Gastrointestinal: Nondistended, soft, non-tender, BS present x 4  : no Allison, no SP discomfort  Musculoskeletal: ROM intact, no joint swelling  Extremities: normal extremities, no edema  Neurological: A&O x 1, speech clear, squeezes hands- appears to be equal strength, + left facial droop noted-patient leaning head to the left side, occasional myoclonic jerks to BUE noted x2 during 10-15 minute visit   Skin: Warm and dry, no lesions, no rashes, + tenting   Psych: calm, stable mood      Last Recorded Vitals  Blood pressure 115/63, pulse 66, temperature 37.1 °C (98.8 °F), temperature source Temporal, resp. rate 16, height 1.753 m (5' 9.02\"), weight 70.3 kg (154 lb 14.4 oz), SpO2 93%.  Intake/Output last 3 Shifts:  No intake/output data recorded.    Relevant Results             Scheduled " medications  bisacodyl, 10 mg, rectal, Daily  budesonide, 0.5 mg, nebulization, BID  carbidopa-levodopa, 3 tablet, oral, TID  enoxaparin, 40 mg, subcutaneous, q24h  memantine, 5 mg, oral, BID  pantoprazole, 40 mg, oral, Daily before breakfast   Or  pantoprazole, 40 mg, intravenous, Daily before breakfast  potassium chloride, 20 mEq, oral, Once  senna, 5 mL, oral, BID  tamsulosin, 0.4 mg, oral, Nightly      Continuous medications  dextrose 5%, 75 mL/hr, Last Rate: 75 mL/hr (02/17/25 0836)      PRN medications  PRN medications: acetaminophen **OR** acetaminophen **OR** acetaminophen, albuterol, guaiFENesin, LORazepam, melatonin, ondansetron **OR** ondansetron, polyethylene glycol     Results for orders placed or performed during the hospital encounter of 02/16/25 (from the past 24 hours)   Urinalysis with Reflex Culture and Microscopic   Result Value Ref Range    Color, Urine Light-Yellow Light-Yellow, Yellow, Dark-Yellow    Appearance, Urine Clear Clear    Specific Gravity, Urine 1.019 1.005 - 1.035    pH, Urine 5.0 5.0, 5.5, 6.0, 6.5, 7.0, 7.5, 8.0    Protein, Urine NEGATIVE NEGATIVE, 10 (TRACE), 20 (TRACE) mg/dL    Glucose, Urine Normal Normal mg/dL    Blood, Urine 1.0 (3+) (A) NEGATIVE mg/dL    Ketones, Urine NEGATIVE NEGATIVE mg/dL    Bilirubin, Urine NEGATIVE NEGATIVE mg/dL    Urobilinogen, Urine Normal Normal mg/dL    Nitrite, Urine NEGATIVE NEGATIVE    Leukocyte Esterase, Urine NEGATIVE NEGATIVE   Urinalysis Microscopic   Result Value Ref Range    WBC, Urine 1-5 1-5, NONE /HPF    RBC, Urine >20 (A) NONE, 1-2, 3-5 /HPF    Mucus, Urine FEW Reference range not established. /LPF    Hyaline Casts, Urine 2+ (A) NONE /LPF   CBC   Result Value Ref Range    WBC 6.0 4.4 - 11.3 x10*3/uL    nRBC 0.0 0.0 - 0.0 /100 WBCs    RBC 3.72 (L) 4.50 - 5.90 x10*6/uL    Hemoglobin 10.4 (L) 13.5 - 17.5 g/dL    Hematocrit 36.0 (L) 41.0 - 52.0 %    MCV 97 80 - 100 fL    MCH 28.0 26.0 - 34.0 pg    MCHC 28.9 (L) 32.0 - 36.0 g/dL    RDW  14.3 11.5 - 14.5 %    Platelets 150 150 - 450 x10*3/uL   Basic metabolic panel   Result Value Ref Range    Glucose 102 (H) 74 - 99 mg/dL    Sodium 160 (HH) 136 - 145 mmol/L    Potassium 4.1 3.5 - 5.3 mmol/L    Chloride 125 (H) 98 - 107 mmol/L    Bicarbonate 29 21 - 32 mmol/L    Anion Gap 10 10 - 20 mmol/L    Urea Nitrogen 27 (H) 6 - 23 mg/dL    Creatinine 1.28 0.50 - 1.30 mg/dL    eGFR 55 (L) >60 mL/min/1.73m*2    Calcium 8.1 (L) 8.6 - 10.3 mg/dL   Basic metabolic panel   Result Value Ref Range    Glucose 97 74 - 99 mg/dL    Sodium 157 (H) 136 - 145 mmol/L    Potassium 3.4 (L) 3.5 - 5.3 mmol/L    Chloride 121 (H) 98 - 107 mmol/L    Bicarbonate 30 21 - 32 mmol/L    Anion Gap 9 (L) 10 - 20 mmol/L    Urea Nitrogen 22 6 - 23 mg/dL    Creatinine 1.13 0.50 - 1.30 mg/dL    eGFR 63 >60 mL/min/1.73m*2    Calcium 8.0 (L) 8.6 - 10.3 mg/dL       US renal complete    Result Date: 2/17/2025  STUDY: Renal and Bladder Ultrasound; 2/17/2025 at 10:38 am. INDICATION:  Acute kidney injury with hypernatremia.  COMPARISON:  None available. ACCESSION NUMBER(S): [id number] ORDERING CLINICIAN: HUANG JONES TECHNIQUE: Ultrasound of the Kidneys and Bladder.  FINDINGS: RIGHT KIDNEY: The right kidney measures 9.9 cm in length.  Renal cortical echotexture is normal.  There is no hydronephrosis.  There are no stones.  There are no cysts. LEFT KIDNEY: The left kidney measures 9.3 cm in length.  Renal cortical echotexture is normal.  There is no hydronephrosis.  There are no stones.  There is a simple cyst within the mid portion measuring 4.5 x 5.2 x 4.1 cm.  BLADDER: The urinary bladder is anechoic. The distended bladder shows no evidence of wall thickening.  The distended bladder volume is 505 cc. Bilateral ureteral jets are visualized.   Incidental finding: There are multiple stones within the gallbladder. Negative Pendleton's sign.    No hydronephrosis bilaterally. Right renal cyst. Incidental finding: There are multiple stones within  the gallbladder. Signed by Christiano Loredo MD    ECG 12 lead    Result Date: 2/17/2025  Accelerated Junctional rhythm Inferior infarct , age undetermined Abnormal ECG When compared with ECG of 19-DEC-2024 17:27, (unconfirmed) Junctional rhythm has replaced Sinus rhythm Questionable change in QRS duration Criteria for Anterior infarct are no longer Present Inferior infarct is now Present    XR chest 1 view    Result Date: 2/16/2025  STUDY: Chest Radiograph;  2/16/2025 1:46 PM INDICATION: Weakness. COMPARISON: CXR 12/19/2024. ACCESSION NUMBER(S): [id number] ORDERING CLINICIAN: DEON PETERSEN TECHNIQUE:  Frontal chest was obtained at 13:46 hours. FINDINGS: CARDIOMEDIASTINAL SILHOUETTE: Cardiomediastinal silhouette is normal in size and configuration. Thoracic aorta is minimally tortuous.  LUNGS: Lungs are clear. There are no pleural effusions. There is no pneumothorax.  ABDOMEN: No remarkable upper abdominal findings.  BONES: No acute osseous changes.    Stable exam. No detectable active cardiopulmonary disease. Signed by Seth oMise MD    CT brain attack head wo IV contrast    Addendum Date: 2/16/2025    Interpreted By:  Rodney Helms, ADDENDUM: Rodney Helms discussed the significance and urgency of this critical finding by secure chat with  DEON PETERSEN on 2/16/2025 at 1:29 pm.  (**-RCF-**) Findings:  See findings.     Signed by: Rodney Helms 2/16/2025 1:30 PM   -------- ORIGINAL REPORT -------- Dictation workstation:   [id number]    Result Date: 2/16/2025  Interpreted By:  Rodney Helms, STUDY: CT BRAIN ATTACK HEAD WO IV CONTRAST;  2/16/2025 12:58 pm   INDICATION: Signs/Symptoms:stroke.     COMPARISON: None.   ACCESSION NUMBER(S): [id number]   ORDERING CLINICIAN: DEON PETERSEN   TECHNIQUE: Noncontrast axial CT scan of head was performed. Angled reformats in brain and bone windows were generated. The images were reviewed in bone, brain, blood and soft tissue windows. December 19, 2024 head CT    FINDINGS: The ventricles, cisterns and sulci are prominent, consistent with mild diffuse volume loss. There are areas of nonspecific white matter hypodensity, which are probably age-related or microvascular in nature.   Gray-white differentiation is intact and there is no evidence of acute cortical infarct. No mass, mass effect or midline shift is seen. There is no evidence of hemorrhage.   There is mild mucosal thickening paranasal sinuses a newly seen mild fluid and/or secretions left maxillary sinus and left sphenoid sinus.       No evidence of acute cortical infarct or intracranial hemorrhage.   Atrophy and nonspecific low-density white matter changes.   Mucosal thickening paranasal sinuses with mild newly seen secretions and/or fluid left maxillary and left sphenoid sinus. Correlate with symptomatology.   MACRO: None   Signed by: Rodney Helms 2/16/2025 1:21 PM Dictation workstation:   [id number]         Assessment/Plan   Assessment & Plan  Hypernatremia    Flakito Mcfarlane is a 86 yo male with PMH of Parkinson's disease with physical debility, dementia, BPH, COPD, HTN who was sent to [facility] ED with AMS and facial droop.    In ED, labs were notable for sodium of 160 and creatinine of 1.72.  CTH was negative for acute intracranial process.  He was given IV fluids and admitted.      Hypernatremia   - secondary to dehydration/poor water intake   - continue D5W; monitor sodium levels closely to avoid rapid drop.  Repeat labs this afternoon shows Na 157; appropriately decreasing   - hold lasix  - nephrology consulted   - dietitian consult     Acute metabolic encephalopathy   Left facial droop  - encephalopathy is likely 2/2 above, monitor closely for improvement as sodium levels improve   - facial droop- possibly 2/2 encephalopathy/patient leaning to the left side- Niece felt that his face would droop to the left at baseline when sleeping.   - evaluated by neurology telehealth-->felt to be possibly Bell's palsy.  Will  rule out CVA--MRI pending  - SLP consult- dysphagia diet with pureed/thin liquids     TC  Acute urinary retention    BPH  - improved with IV fluids   - renal ultrasound revealed urination retention   - difficult catheter placement due to urethral stricture-- required urologist to place catheter  - continue flomax    Parkinson's disease   - continue sinemet--changed to immediate release formulation so tablets can be crushed-- monitor closely and adjust dose as needed.  May switch back to CR formulation if patient becomes able to swallow whole pills safely    Dementia   - continue namenda  - orientation protocol, sleep hygiene measures, encourage family presence at bedside     COPD   - stable, not in exacerbation; continue breathing treatments     HTN   - blood pressures stable, home medications currently held   - resume hydralazine as indicated   - unclear why patient was on lasix for hypertension-- discontinue in the setting of dehydration and hypernatremia     VTE/GI prophylaxis   - continue lovenox   - bowel reigmen in place     Discharge planning   - patient is from [facility] interested in finding new facility for him   - TCC/SW aware     Discussed with Dr. Booker, Dr. Steel.           I spent 50 minutes in the professional and overall care of this patient.      Dimple Washington, APRN-CNP

## 2025-02-18 ENCOUNTER — HOSPITAL ENCOUNTER (OUTPATIENT)
Dept: CARDIOLOGY | Facility: HOSPITAL | Age: 85
Discharge: HOME | DRG: 640 | End: 2025-02-18
Payer: COMMERCIAL

## 2025-02-18 LAB
ANION GAP SERPL CALC-SCNC: 8 MMOL/L (ref 10–20)
BASOPHILS # BLD AUTO: 0.01 X10*3/UL (ref 0–0.1)
BASOPHILS NFR BLD AUTO: 0.2 %
BUN SERPL-MCNC: 19 MG/DL (ref 6–23)
CALCIUM SERPL-MCNC: 7.9 MG/DL (ref 8.6–10.3)
CHLORIDE SERPL-SCNC: 117 MMOL/L (ref 98–107)
CO2 SERPL-SCNC: 31 MMOL/L (ref 21–32)
CREAT SERPL-MCNC: 0.99 MG/DL (ref 0.5–1.3)
EGFRCR SERPLBLD CKD-EPI 2021: 74 ML/MIN/1.73M*2
EOSINOPHIL # BLD AUTO: 0.25 X10*3/UL (ref 0–0.4)
EOSINOPHIL NFR BLD AUTO: 4.9 %
ERYTHROCYTE [DISTWIDTH] IN BLOOD BY AUTOMATED COUNT: 14.1 % (ref 11.5–14.5)
GLUCOSE SERPL-MCNC: 89 MG/DL (ref 74–99)
HCT VFR BLD AUTO: 35.4 % (ref 41–52)
HGB BLD-MCNC: 10.5 G/DL (ref 13.5–17.5)
IMM GRANULOCYTES # BLD AUTO: 0.01 X10*3/UL (ref 0–0.5)
IMM GRANULOCYTES NFR BLD AUTO: 0.2 % (ref 0–0.9)
LYMPHOCYTES # BLD AUTO: 1.59 X10*3/UL (ref 0.8–3)
LYMPHOCYTES NFR BLD AUTO: 31.3 %
MAGNESIUM SERPL-MCNC: 2 MG/DL (ref 1.6–2.4)
MCH RBC QN AUTO: 27.6 PG (ref 26–34)
MCHC RBC AUTO-ENTMCNC: 29.7 G/DL (ref 32–36)
MCV RBC AUTO: 93 FL (ref 80–100)
MONOCYTES # BLD AUTO: 0.52 X10*3/UL (ref 0.05–0.8)
MONOCYTES NFR BLD AUTO: 10.2 %
NEUTROPHILS # BLD AUTO: 2.7 X10*3/UL (ref 1.6–5.5)
NEUTROPHILS NFR BLD AUTO: 53.2 %
NRBC BLD-RTO: 0 /100 WBCS (ref 0–0)
PLATELET # BLD AUTO: 126 X10*3/UL (ref 150–450)
POTASSIUM SERPL-SCNC: 3.4 MMOL/L (ref 3.5–5.3)
RBC # BLD AUTO: 3.8 X10*6/UL (ref 4.5–5.9)
SODIUM SERPL-SCNC: 153 MMOL/L (ref 136–145)
WBC # BLD AUTO: 5.1 X10*3/UL (ref 4.4–11.3)

## 2025-02-18 PROCEDURE — 99497 ADVNCD CARE PLAN 30 MIN: CPT | Performed by: INTERNAL MEDICINE

## 2025-02-18 PROCEDURE — 80048 BASIC METABOLIC PNL TOTAL CA: CPT | Performed by: INTERNAL MEDICINE

## 2025-02-18 PROCEDURE — 2500000004 HC RX 250 GENERAL PHARMACY W/ HCPCS (ALT 636 FOR OP/ED): Performed by: INTERNAL MEDICINE

## 2025-02-18 PROCEDURE — 99233 SBSQ HOSP IP/OBS HIGH 50: CPT | Performed by: INTERNAL MEDICINE

## 2025-02-18 PROCEDURE — 83735 ASSAY OF MAGNESIUM: CPT | Performed by: NURSE PRACTITIONER

## 2025-02-18 PROCEDURE — 36415 COLL VENOUS BLD VENIPUNCTURE: CPT | Performed by: INTERNAL MEDICINE

## 2025-02-18 PROCEDURE — 2500000001 HC RX 250 WO HCPCS SELF ADMINISTERED DRUGS (ALT 637 FOR MEDICARE OP): Performed by: INTERNAL MEDICINE

## 2025-02-18 PROCEDURE — 85025 COMPLETE CBC W/AUTO DIFF WBC: CPT | Performed by: INTERNAL MEDICINE

## 2025-02-18 PROCEDURE — 94640 AIRWAY INHALATION TREATMENT: CPT

## 2025-02-18 PROCEDURE — 99232 SBSQ HOSP IP/OBS MODERATE 35: CPT | Performed by: INTERNAL MEDICINE

## 2025-02-18 PROCEDURE — 1100000001 HC PRIVATE ROOM DAILY

## 2025-02-18 PROCEDURE — 2500000005 HC RX 250 GENERAL PHARMACY W/O HCPCS: Performed by: INTERNAL MEDICINE

## 2025-02-18 PROCEDURE — 93010 ELECTROCARDIOGRAM REPORT: CPT | Performed by: INTERNAL MEDICINE

## 2025-02-18 PROCEDURE — 2500000002 HC RX 250 W HCPCS SELF ADMINISTERED DRUGS (ALT 637 FOR MEDICARE OP, ALT 636 FOR OP/ED): Performed by: NURSE PRACTITIONER

## 2025-02-18 PROCEDURE — 2500000001 HC RX 250 WO HCPCS SELF ADMINISTERED DRUGS (ALT 637 FOR MEDICARE OP): Performed by: NURSE PRACTITIONER

## 2025-02-18 PROCEDURE — 93005 ELECTROCARDIOGRAM TRACING: CPT

## 2025-02-18 PROCEDURE — 2500000002 HC RX 250 W HCPCS SELF ADMINISTERED DRUGS (ALT 637 FOR MEDICARE OP, ALT 636 FOR OP/ED): Performed by: INTERNAL MEDICINE

## 2025-02-18 PROCEDURE — 99232 SBSQ HOSP IP/OBS MODERATE 35: CPT | Performed by: NURSE PRACTITIONER

## 2025-02-18 RX ORDER — DEXTROSE MONOHYDRATE 50 MG/ML
75 INJECTION, SOLUTION INTRAVENOUS CONTINUOUS
Status: ACTIVE | OUTPATIENT
Start: 2025-02-18 | End: 2025-02-19

## 2025-02-18 RX ORDER — POTASSIUM CHLORIDE 1.5 G/1.58G
20 POWDER, FOR SOLUTION ORAL ONCE
Status: COMPLETED | OUTPATIENT
Start: 2025-02-18 | End: 2025-02-18

## 2025-02-18 RX ORDER — POTASSIUM CHLORIDE 1.5 G/1.58G
40 POWDER, FOR SOLUTION ORAL ONCE
Status: COMPLETED | OUTPATIENT
Start: 2025-02-18 | End: 2025-02-18

## 2025-02-18 RX ORDER — BISACODYL 10 MG/1
10 SUPPOSITORY RECTAL DAILY PRN
Status: DISCONTINUED | OUTPATIENT
Start: 2025-02-18 | End: 2025-02-21 | Stop reason: HOSPADM

## 2025-02-18 RX ADMIN — BUDESONIDE 0.5 MG: 0.5 INHALANT RESPIRATORY (INHALATION) at 20:16

## 2025-02-18 RX ADMIN — DEXTROSE MONOHYDRATE 100 ML/HR: 50 INJECTION, SOLUTION INTRAVENOUS at 08:36

## 2025-02-18 RX ADMIN — SENNOSIDES 5 ML: 8.8 LIQUID ORAL at 20:33

## 2025-02-18 RX ADMIN — PANTOPRAZOLE SODIUM 40 MG: 40 INJECTION, POWDER, FOR SOLUTION INTRAVENOUS at 06:24

## 2025-02-18 RX ADMIN — SENNOSIDES 5 ML: 8.8 LIQUID ORAL at 08:27

## 2025-02-18 RX ADMIN — ENOXAPARIN SODIUM 40 MG: 100 INJECTION SUBCUTANEOUS at 15:01

## 2025-02-18 RX ADMIN — CARBIDOPA AND LEVODOPA 3 TABLET: 25; 100 TABLET ORAL at 15:01

## 2025-02-18 RX ADMIN — POTASSIUM CHLORIDE 20 MEQ: 1.5 POWDER, FOR SOLUTION ORAL at 08:36

## 2025-02-18 RX ADMIN — TAMSULOSIN HYDROCHLORIDE 0.4 MG: 0.4 CAPSULE ORAL at 20:33

## 2025-02-18 RX ADMIN — BISACODYL 10 MG: 10 SUPPOSITORY RECTAL at 08:26

## 2025-02-18 RX ADMIN — POTASSIUM CHLORIDE 40 MEQ: 1.5 POWDER, FOR SOLUTION ORAL at 11:24

## 2025-02-18 RX ADMIN — BUDESONIDE 0.5 MG: 0.5 INHALANT RESPIRATORY (INHALATION) at 07:57

## 2025-02-18 RX ADMIN — MEMANTINE 5 MG: 5 TABLET ORAL at 20:33

## 2025-02-18 RX ADMIN — MEMANTINE 5 MG: 5 TABLET ORAL at 08:26

## 2025-02-18 RX ADMIN — Medication 3 MG: at 20:33

## 2025-02-18 RX ADMIN — CARBIDOPA AND LEVODOPA 3 TABLET: 25; 100 TABLET ORAL at 08:26

## 2025-02-18 RX ADMIN — CARBIDOPA AND LEVODOPA 3 TABLET: 25; 100 TABLET ORAL at 20:33

## 2025-02-18 ASSESSMENT — COGNITIVE AND FUNCTIONAL STATUS - GENERAL
MOBILITY SCORE: 6
PERSONAL GROOMING: TOTAL
MOVING FROM LYING ON BACK TO SITTING ON SIDE OF FLAT BED WITH BEDRAILS: TOTAL
DRESSING REGULAR UPPER BODY CLOTHING: TOTAL
DAILY ACTIVITIY SCORE: 6
CLIMB 3 TO 5 STEPS WITH RAILING: TOTAL
DRESSING REGULAR LOWER BODY CLOTHING: TOTAL
HELP NEEDED FOR BATHING: TOTAL
WALKING IN HOSPITAL ROOM: TOTAL
STANDING UP FROM CHAIR USING ARMS: TOTAL
EATING MEALS: TOTAL
TOILETING: TOTAL
MOVING TO AND FROM BED TO CHAIR: TOTAL
HELP NEEDED FOR BATHING: TOTAL
TURNING FROM BACK TO SIDE WHILE IN FLAT BAD: TOTAL
MOVING FROM LYING ON BACK TO SITTING ON SIDE OF FLAT BED WITH BEDRAILS: TOTAL
DRESSING REGULAR LOWER BODY CLOTHING: TOTAL
EATING MEALS: TOTAL
PERSONAL GROOMING: TOTAL
STANDING UP FROM CHAIR USING ARMS: TOTAL
WALKING IN HOSPITAL ROOM: TOTAL
DRESSING REGULAR UPPER BODY CLOTHING: TOTAL
DAILY ACTIVITIY SCORE: 6
MOVING TO AND FROM BED TO CHAIR: TOTAL
TURNING FROM BACK TO SIDE WHILE IN FLAT BAD: TOTAL
TOILETING: TOTAL
MOBILITY SCORE: 6
CLIMB 3 TO 5 STEPS WITH RAILING: TOTAL

## 2025-02-18 ASSESSMENT — PAIN SCALES - GENERAL: PAINLEVEL_OUTOF10: 0 - NO PAIN

## 2025-02-18 ASSESSMENT — PAIN SCALES - WONG BAKER: WONGBAKER_NUMERICALRESPONSE: NO HURT

## 2025-02-18 NOTE — CARE PLAN
The patient's goals for the shift include      The clinical goals for the shift include maintain safety      Problem: Fall/Injury  Goal: Not fall by end of shift  Outcome: Progressing  Goal: Be free from injury by end of the shift  Outcome: Progressing

## 2025-02-18 NOTE — PROGRESS NOTES
"Flakito Mcfarlane is a 85 y.o. male on day 2 of admission presenting with Hypernatremia.    Subjective     More alert today.  Opens eyes upon walking in the room.  Oriented x 1. + dysathria and L facial droop again noted. Able to follow commands.         Objective     Physical Exam    Constitutional: NAD, alert  Eyes: no icterus  ENMT: mucous membranes dry, no lesions seen  Head/Neck: Neck supple  Respiratory/Thorax: CTA bilaterally, non-labored breathing, no cough, on RA  Cardiovascular: Regular rate and rhythm, no murmurs heard  Gastrointestinal: Nondistended, soft, non-tender, BS present x 4  : no Allison, no SP discomfort  Musculoskeletal: ROM intact, no joint swelling  Extremities: normal extremities, no edema  Neurological: A&O x 1, + dysathria, follows commands appropriately , + left facial droop   Skin: Warm and dry, no lesions, no rashes  Psych: calm, stable mood      Last Recorded Vitals  Blood pressure 126/74, pulse 54, temperature 36.6 °C (97.8 °F), temperature source Temporal, resp. rate 20, height 1.753 m (5' 9.02\"), weight 70.3 kg (154 lb 14.4 oz), SpO2 96%.  Intake/Output last 3 Shifts:  I/O last 3 completed shifts:  In: 240 (3.4 mL/kg) [P.O.:240]  Out: - (0 mL/kg)   Weight: 70.3 kg     Relevant Results             Scheduled medications  budesonide, 0.5 mg, nebulization, BID  carbidopa-levodopa, 3 tablet, oral, TID  enoxaparin, 40 mg, subcutaneous, q24h  memantine, 5 mg, oral, BID  pantoprazole, 40 mg, oral, Daily before breakfast   Or  pantoprazole, 40 mg, intravenous, Daily before breakfast  senna, 5 mL, oral, BID  tamsulosin, 0.4 mg, oral, Nightly      Continuous medications  dextrose 5%, 75 mL/hr, Last Rate: 75 mL/hr (02/18/25 1123)      PRN medications  PRN medications: acetaminophen **OR** acetaminophen **OR** acetaminophen, albuterol, bisacodyl, guaiFENesin, LORazepam, melatonin, ondansetron **OR** ondansetron, polyethylene glycol     Results for orders placed or performed during the hospital " encounter of 02/16/25 (from the past 24 hours)   Urine electrolytes   Result Value Ref Range    Sodium, Urine Random 136 mmol/L    Sodium/Creatinine Ratio 211 Not established. mmol/g Creat    Potassium, Urine Random 39 mmol/L    Potassium/Creatinine Ratio 60 Not established mmol/g Creat    Chloride, Urine Random 169 mmol/L    Chloride/Creatinine Ratio 262 23 - 275 mmol/g creat    Creatinine, Urine Random 64.6 20.0 - 370.0 mg/dL   Basic metabolic panel   Result Value Ref Range    Glucose 89 74 - 99 mg/dL    Sodium 153 (H) 136 - 145 mmol/L    Potassium 3.4 (L) 3.5 - 5.3 mmol/L    Chloride 117 (H) 98 - 107 mmol/L    Bicarbonate 31 21 - 32 mmol/L    Anion Gap 8 (L) 10 - 20 mmol/L    Urea Nitrogen 19 6 - 23 mg/dL    Creatinine 0.99 0.50 - 1.30 mg/dL    eGFR 74 >60 mL/min/1.73m*2    Calcium 7.9 (L) 8.6 - 10.3 mg/dL   CBC and Auto Differential   Result Value Ref Range    WBC 5.1 4.4 - 11.3 x10*3/uL    nRBC 0.0 0.0 - 0.0 /100 WBCs    RBC 3.80 (L) 4.50 - 5.90 x10*6/uL    Hemoglobin 10.5 (L) 13.5 - 17.5 g/dL    Hematocrit 35.4 (L) 41.0 - 52.0 %    MCV 93 80 - 100 fL    MCH 27.6 26.0 - 34.0 pg    MCHC 29.7 (L) 32.0 - 36.0 g/dL    RDW 14.1 11.5 - 14.5 %    Platelets 126 (L) 150 - 450 x10*3/uL    Neutrophils % 53.2 40.0 - 80.0 %    Immature Granulocytes %, Automated 0.2 0.0 - 0.9 %    Lymphocytes % 31.3 13.0 - 44.0 %    Monocytes % 10.2 2.0 - 10.0 %    Eosinophils % 4.9 0.0 - 6.0 %    Basophils % 0.2 0.0 - 2.0 %    Neutrophils Absolute 2.70 1.60 - 5.50 x10*3/uL    Immature Granulocytes Absolute, Automated 0.01 0.00 - 0.50 x10*3/uL    Lymphocytes Absolute 1.59 0.80 - 3.00 x10*3/uL    Monocytes Absolute 0.52 0.05 - 0.80 x10*3/uL    Eosinophils Absolute 0.25 0.00 - 0.40 x10*3/uL    Basophils Absolute 0.01 0.00 - 0.10 x10*3/uL   Magnesium   Result Value Ref Range    Magnesium 2.00 1.60 - 2.40 mg/dL       MR angio neck wo IV contrast    Result Date: 2/17/2025  Interpreted By:  Flakito Roman, STUDY: MR ANGIO NECK WO IV CONTRAST;   2/17/2025 5:56 pm   INDICATION: Signs/Symptoms:left facial droop.     COMPARISON: None.   ACCESSION NUMBER(S): [id number]   ORDERING CLINICIAN: LU LIZ   TECHNIQUE: Time of flight MRA of the neck was performed. The images were reviewed as source images and maximum intensity projections.   FINDINGS: The source images are mildly degraded by artifact.   Right carotid vessels:  There is expected flow signal in the visualized portion of the common carotid artery.  No significant stenosis at the carotid bifurcation. The internal carotid artery in the neck demonstrates expected flow signal.   Left carotid vessels:   There is expected flow signal in the visualized portion of the common carotid artery.  No significant stenosis at the carotid bifurcation. The internal carotid artery in the neck demonstrates expected flow signal.   Vertebral vessels:   The visualized segments of the cervical vertebral arteries demonstrate expected flow signal.       No evidence of significant stenosis on MRA of the neck.   MACRO: None   Signed by: Flakito Roman 2/17/2025 6:19 PM Dictation workstation:   [id number]    MR angio head wo IV contrast    Result Date: 2/17/2025  Interpreted By:  Flakito Roman, STUDY: MR ANGIO HEAD WO IV CONTRAST;  2/17/2025 5:56 pm   INDICATION: Signs/Symptoms:left facial droop.     COMPARISON: None.   ACCESSION NUMBER(S): [id number]   ORDERING CLINICIAN: LU LIZ   TECHNIQUE: Time-of-flight MRA of the head was performed. The images were reviewed as source images and maximum intensity projections.   FINDINGS: No intracranial major arterial occlusion. No aneurysms. No vascular malformations detected.       1.  No evidence for large branch vessel cutoffs of the visualized intracranial arterial vasculature. 2.  No definite aneurysm.   MACRO: None   Signed by: Flakito Roman 2/17/2025 6:17 PM Dictation workstation:   [id number]    MR brain wo IV contrast    Result Date: 2/17/2025  Interpreted By:   Flakito Roman, STUDY: MR BRAIN WO IV CONTRAST;  2/17/2025 5:56 pm   INDICATION: Signs/Symptoms:Left facial droop.     COMPARISON: CT head yesterday.   ACCESSION NUMBER(S): [id number]   ORDERING CLINICIAN: LU LIZ   TECHNIQUE: Axial T2, FLAIR, DWI, gradient echo T2 and sagittal and coronal T1 weighted images of brain were acquired.   FINDINGS: No abnormally restricted diffusion.  No acute intracranial hemorrhage.   No intracranial mass. Major vascular flow voids intact.   Shallow fusiform prominence of the extra-axial spaces overlying the right frontal convexity and right lateral cerebellar hemisphere follows CSF signal intensity on all sequences. There is very mild right-to-left midline shift in the supratentorial compartment.   Mild to moderate white matter FLAIR signal increase, most commonly seen with chronic small vessel ischemic change.  Presence of older small infarctions in these areas not excluded.   Suspected old lacunar infarction inferior right lentiform nucleus.   Mild paranasal sinus/ethmoid mucosal inflammatory changes. No significant nonspecific mastoid fluid. Orbital contents unremarkable. No destructive osseous lesions identified.       1. No evidence of acute infarct, intracranial mass effect or significant midline shift. 2. Mild to moderate white matter FLAIR signal increase, most commonly seen with chronic small vessel ischemic change.  Presence of older small infarctions in these areas not excluded. 3. Suspected old lacunar infarction inferior right lentiform nucleus. 4. Shallow fusiform prominence of the extra-axial spaces overlying the right frontal convexity and right lateral cerebellar hemisphere follows CSF signal intensity on all sequences. There is very mild right-to-left midline shift in the supratentorial compartment. Suspect chronic subdural hematomas or hygromas.     MACRO: None   Signed by: Flakito Roman 2/17/2025 6:14 PM Dictation workstation:   [id number]     renal  complete    Result Date: 2/17/2025  STUDY: Renal and Bladder Ultrasound; 2/17/2025 at 10:38 am. INDICATION:  Acute kidney injury with hypernatremia.  COMPARISON:  None available. ACCESSION NUMBER(S): [id number] ORDERING CLINICIAN: HUANG JONES TECHNIQUE: Ultrasound of the Kidneys and Bladder.  FINDINGS: RIGHT KIDNEY: The right kidney measures 9.9 cm in length.  Renal cortical echotexture is normal.  There is no hydronephrosis.  There are no stones.  There are no cysts. LEFT KIDNEY: The left kidney measures 9.3 cm in length.  Renal cortical echotexture is normal.  There is no hydronephrosis.  There are no stones.  There is a simple cyst within the mid portion measuring 4.5 x 5.2 x 4.1 cm.  BLADDER: The urinary bladder is anechoic. The distended bladder shows no evidence of wall thickening.  The distended bladder volume is 505 cc. Bilateral ureteral jets are visualized.   Incidental finding: There are multiple stones within the gallbladder. Negative Pendleton's sign.    No hydronephrosis bilaterally. Right renal cyst. Incidental finding: There are multiple stones within the gallbladder. Signed by Christiano Loredo MD         Assessment/Plan   Assessment & Plan  Hypernatremia    Flakito Mcfarlane is a 86 yo male with PMH of Parkinson's disease with physical debility, dementia, BPH, COPD, HTN who was sent to [facility] ED with AMS and facial droop.    In ED, labs were notable for sodium of 160 and creatinine of 1.72.  CTH was negative for acute intracranial process.  He was given IV fluids and admitted.      Hypernatremia   - secondary to dehydration/poor water intake   - continue D5W; monitor sodium levels closely  - sodium improving appropriately to 153 this AM   - hold lasix  - nephrology consulted   - dietitian consult-->ensure supplements started TID    Acute metabolic encephalopathy   Left facial droop  - encephalopathy is likely 2/2 above, improving as hypernatremia improves   - facial droop- evaluated by neurology  telehealth-->felt to be possibly Bell's palsy.    - MRI is negative for acute infarct; shows suspected old lacunar infarct, suspected chronic subdural hematomas or hygromas   - SLP consult- dysphagia diet with pureed/thin liquids     TC  Acute urinary retention    BPH  - improved with IV fluids   - renal ultrasound revealed urination retention   - difficult catheter placement due to urethral stricture-- required urologist to place catheter  - continue flomax  - plan for outpatient follow up with urology     Parkinson's disease   - continue sinemet--changed to immediate release formulation so tablets can be crushed-- monitor closely and adjust dose as needed.  May switch back to CR formulation if patient becomes able to swallow whole pills safely    Dementia   - continue namenda  - orientation protocol, sleep hygiene measures, encourage family presence at bedside     COPD   - stable, not in exacerbation; continue breathing treatments     HTN   - blood pressures stable, home medications currently held   - resume hydralazine as indicated   - unclear why patient was on lasix for hypertension-- discontinue in the setting of dehydration and hypernatremia     VTE/GI prophylaxis   - continue lovenox   - bowel reigmen in place     Discharge planning   - patient is from [facility], [facility] interested in finding new facility for him   - [facility]/SW aware   - palliative care following    Discussed with Dr. Booker.         I spent 35 minutes in the professional and overall care of this patient.      BIANKA Wells-CNP

## 2025-02-18 NOTE — PROGRESS NOTES
2/18/2025 1157  SW reached out to Fantasma, pts niece listed in chart to get other choices for LTC per message via private chat. Fantasma reported she is still hoping [facility] will have a bed available before pt is ready for discharge as that is the FOC, however [facility] and [facility] are the next FOC. Sent message requesting referrals to be built and sent via [facility].     2/18/2025 1511  Spoke to Fantasma to get more choices as [place] is not in network, [place] does not have beds and [facility] is not responding. Fantasma requested [facility] and [facility] as next choice. SW advised she continue looking over the list to identify other choices as well in the event we run into the same situation. SW requested referrals to be sent to the new facilities.

## 2025-02-18 NOTE — PROGRESS NOTES
"Nutrition Follow-up Note  Nutrition Assessment      Reason for Assessment: Provider consult order    Flakito Mcfarlane is a 85 y.o. year old male patient with Bell's palsy [G51.0]  Hypernatremia [E87.0]  Acute metabolic encephalopathy [G93.41]  On day #2 of hospital admission    Visited with pt, niece is present. Niece states:  -No food allergies  -Normally on a bite sized textured diet; knows to eat slowly  -Good appetite normally, good eater; feeds self  -3meals at the facility at 100% daily  -Used to drink Boost; enjoys Boost  -Feels like he has lost some wt over the course of a few weeks, maybe a month  -170 something lbs back in January (now 161.6lb per bedscale; yesterday was 154lb)  -Starting to refuse foods that he enjoys, like pudding (started a few days ago)  -Feels like \"he is declining.\" Doesn't laugh as much anymore; catches him staring off.  -Agreeable to nutritional supplements    Scheduled medications  budesonide, 0.5 mg, nebulization, BID  carbidopa-levodopa, 3 tablet, oral, TID  enoxaparin, 40 mg, subcutaneous, q24h  memantine, 5 mg, oral, BID  pantoprazole, 40 mg, oral, Daily before breakfast   Or  pantoprazole, 40 mg, intravenous, Daily before breakfast  senna, 5 mL, oral, BID  tamsulosin, 0.4 mg, oral, Nightly      Continuous medications  dextrose 5%, 75 mL/hr, Last Rate: 75 mL/hr (02/18/25 1123)      PRN medications  PRN medications: acetaminophen **OR** acetaminophen **OR** acetaminophen, albuterol, bisacodyl, guaiFENesin, LORazepam, melatonin, ondansetron **OR** ondansetron, polyethylene glycol    Nutrition Significant Labs:  BMP Trend:   Results from last 7 days   Lab Units 02/18/25  0704 02/17/25  1418 02/17/25  0445 02/16/25  1333   GLUCOSE mg/dL 89 97 102* 92   CALCIUM mg/dL 7.9* 8.0* 8.1* 8.4*   SODIUM mmol/L 153* 157* 160* 160*   POTASSIUM mmol/L 3.4* 3.4* 4.1 4.1   CO2 mmol/L 31 30 29 30   CHLORIDE mmol/L 117* 121* 125* 125*   BUN mg/dL 19 22 27* 37*   CREATININE mg/dL 0.99 1.13 1.28 1.72*    " ", BG POCT trend:   Results from last 7 days   Lab Units 02/16/25  1302   POCT GLUCOSE mg/dL 79    , Liver Function Trend:   Results from last 7 days   Lab Units 02/16/25  1333   ALK PHOS U/L 62   AST U/L 13   ALT U/L <3*   BILIRUBIN TOTAL mg/dL 0.7    , Renal Lab Trend:   Results from last 7 days   Lab Units 02/18/25  0704 02/17/25  1418 02/17/25  0445 02/16/25  1333   POTASSIUM mmol/L 3.4* 3.4* 4.1 4.1   SODIUM mmol/L 153* 157* 160* 160*   MAGNESIUM mg/dL 2.00  --   --   --    EGFR mL/min/1.73m*2 74 63 55* 38*   BUN mg/dL 19 22 27* 37*   CREATININE mg/dL 0.99 1.13 1.28 1.72*    , Lipid Panel:   Lab Results   Component Value Date    CHOL 123 09/22/2021    HDL 42.7 09/22/2021    CHHDL 2.9 09/22/2021    LDLF 71 09/22/2021    VLDL 9 09/22/2021    TRIG 47 09/22/2021    , Vit D:   Lab Results   Component Value Date    VITD25 58 05/11/2021    , Vit B12:   Lab Results   Component Value Date    FVEIDNYD45 398 12/31/2020      History:  Food and Nutrient History: Unknown- pt sleeping at time of visit, not roused by name.    Anthropometrics:  Height: 175.3 cm (5' 9.02\")  Weight: 70.3 kg (154 lb 14.4 oz)  BMI (Calculated): 22.86    Weight Change: -1.73    Wt Readings from Last 2 Encounters:  02/17/25 70.3 kg (154 lb 14.4 oz)  January '25 170 rito lbs (per niece)  01/18/22 66.7 kg (147 lb)    Significant Weight Loss: Yes  Interpretation of Weight Loss: >5% in 1 month       IBW/kg (Dietitian Calculated): 72.7 kg  Percent of IBW: 97 %       Energy Needs:  Height: 175.3 cm (5' 9.02\")  Temp: 36.6 °C (97.9 °F)    Total Energy Estimated Needs in 24 hours (kCal): 1775 kCal  Energy Estimated Needs per kg Body Weight in 24 hours (kCal/kg): 2100 kCal/kg  Method for Estimating Needs: 25-30kcal/kg    Total Protein Estimated Needs in 24 Hours (g): 70 g  Protein Estimated Needs per kg Body Weight in 24 Hours (g/kg): 105 g/kg  Method for Estimating 24 Hour Protein Needs: 1.0-1.5g/kg    Method for Estimating 24 Hour Fluid Needs: 1mL/kcal or MD " recommendations       Nutrition Focused Physical Findings:  Orbital Fat Pads: Well nourished (slightly bulging fat pads)  Buccal Fat Pads: Well nourished (full, rounded cheeks)    Temporalis: Severe (hollowed scooping depression)  Pectoralis (Clavicular Region): Severe (protruding prominent clavicle)    Edema: none       Skin: Negative  Mouth Findings: Dysphagia  Teeth Findings: Edentulous       Nutrition Diagnosis   Malnutrition Diagnosis  Patient has Malnutrition Diagnosis: Yes  Diagnosis Status: New  Malnutrition Diagnosis: Severe malnutrition related to chronic disease or condition  Related to: declining health; AMS  As Evidenced by: Greater than 5% weight loss in 1 month, declined po intake over the past 2 days (average <50%) and severe muscle loss.  Additional Assessment Information: Muscle loss may be r/t advanced age.    Patient has Nutrition Diagnosis: Yes  Nutrition Diagnosis 1: Increased nutrient needs  Diagnosis Status (1): Active  Related to (1): acute illness  As Evidenced by (1): TC       Nutrition Interventions/Recommendations      Food and/or Nutrient Delivery Interventions  Meals and Snacks: General healthful diet, Texture-modified diet         Medical Food Supplement: Commercial beverage medical food supplement therapy  Goal: Ensure nutritional supplements TID    -Encourage po intake  -Offer foods pt enjoys  -As appetite declines, can offer appetite stimulant    Feeding Assistance: Feeding position management, Meal set up management, Other (Comment)    Collaboration and Referral of Nutrition Care: Collaboration by nutrition professional with other providers  Coordination of Care with Providers: SLP    Nutrition Monitoring and Evaluation   Food and Nutrient Related History  Estimated Energy Intake: Energy intake greater or equal to 75% of estimated energy needs    Fluid Intake: Estimated fluid intake    Intake / Amount of food: Meets > 75% estimated energy needs, Consumes at least 75% or more of  meals/snacks/supplements    Anthropometrics: Body Composition/Growth/Weight History  Body Weight: Body weight - Maintain stable weight    Biochemical Data, Medical Tests and Procedures  Electrolyte and Renal Panel: Other (Comment), Calcium, ionized, Calcium, serum, Sodium, Potassium, Chloride  Criteria: As clinically indicated    Gastrointestinal Profile: Other (Comment), Alanine aminotransferase (ALT)  Criteria: As clinically indicated    Glucose/Endocrine Profile: Glucose within normal limits ( mg/dL)  Criteria: As clinically indicated       Vitamin Profile: Other (Comment)  Criteria: As clinically indicated    Nutrition Focused Physical Findings     Digestive System Finding: Constipation, Vomiting, Nausea, Diarrhea    Muscle Finding: Muscle atrophy    Time Spent (min): 45 minutes  Last Date of Nutrition Visit: 02/18/25  Nutrition Follow-Up Needed?: Dietitian to reassess per policy  Follow up Comment: F/uKAM

## 2025-02-18 NOTE — PROGRESS NOTES
Flakito Mcfarlane is a 85 y.o. male     Remains nonverbal  Sodium is beginning to improve    Review of Systems           Vitals:    02/18/25 1127   BP: 120/50   Pulse:    Resp:    Temp: 36.6 °C (97.9 °F)   SpO2: 94%        Scheduled medications  budesonide, 0.5 mg, nebulization, BID  carbidopa-levodopa, 3 tablet, oral, TID  enoxaparin, 40 mg, subcutaneous, q24h  memantine, 5 mg, oral, BID  pantoprazole, 40 mg, oral, Daily before breakfast   Or  pantoprazole, 40 mg, intravenous, Daily before breakfast  senna, 5 mL, oral, BID  tamsulosin, 0.4 mg, oral, Nightly      Continuous medications  dextrose 5%, 75 mL/hr, Last Rate: 75 mL/hr (02/18/25 1123)      PRN medications  PRN medications: acetaminophen **OR** acetaminophen **OR** acetaminophen, albuterol, bisacodyl, guaiFENesin, LORazepam, melatonin, ondansetron **OR** ondansetron, polyethylene glycol    Lab Review   Results from last 7 days   Lab Units 02/18/25  0704 02/17/25  0445 02/16/25  1333   WBC AUTO x10*3/uL 5.1 6.0 6.3   HEMOGLOBIN g/dL 10.5* 10.4* 10.6*   HEMATOCRIT % 35.4* 36.0* 36.3*   PLATELETS AUTO x10*3/uL 126* 150 151     Results from last 7 days   Lab Units 02/18/25  0704 02/17/25  1418 02/17/25  0445 02/16/25  1333   SODIUM mmol/L 153* 157* 160* 160*   POTASSIUM mmol/L 3.4* 3.4* 4.1 4.1   CHLORIDE mmol/L 117* 121* 125* 125*   CO2 mmol/L 31 30 29 30   BUN mg/dL 19 22 27* 37*   CREATININE mg/dL 0.99 1.13 1.28 1.72*   CALCIUM mg/dL 7.9* 8.0* 8.1* 8.4*   PROTEIN TOTAL g/dL  --   --   --  5.9*   BILIRUBIN TOTAL mg/dL  --   --   --  0.7   ALK PHOS U/L  --   --   --  62   ALT U/L  --   --   --  <3*   AST U/L  --   --   --  13   GLUCOSE mg/dL 89 97 102* 92     Results from last 7 days   Lab Units 02/16/25  1333   TROPHS ng/L 10        MR brain wo IV contrast   Final Result   1. No evidence of acute infarct, intracranial mass effect or   significant midline shift.   2. Mild to moderate white matter FLAIR signal increase, most commonly   seen with chronic small vessel  ischemic change.  Presence of older   small infarctions in these areas not excluded.   3. Suspected old lacunar infarction inferior right lentiform nucleus.   4. Shallow fusiform prominence of the extra-axial spaces overlying   the right frontal convexity and right lateral cerebellar hemisphere   follows CSF signal intensity on all sequences. There is very mild   right-to-left midline shift in the supratentorial compartment.   Suspect chronic subdural hematomas or hygromas.             MACRO:   None        Signed by: Flakito Roman 2/17/2025 6:14 PM   Dictation workstation:   [id number]      MR angio head wo IV contrast   Final Result   1.  No evidence for large branch vessel cutoffs of the visualized   intracranial arterial vasculature.   2.  No definite aneurysm.        MACRO:   None        Signed by: Flakito Roman 2/17/2025 6:17 PM   Dictation workstation:   [id number]      MR angio neck wo IV contrast   Final Result   No evidence of significant stenosis on MRA of the neck.        MACRO:   None        Signed by: Flakito Roman 2/17/2025 6:19 PM   Dictation workstation:   [id number]      US renal complete   Final Result   No hydronephrosis bilaterally.    Right renal cyst.   Incidental finding: There are multiple stones within the gallbladder.   Signed by Christiano Loredo MD      XR chest 1 view   Final Result   Stable exam. No detectable active cardiopulmonary disease.   Signed by Seth Moise MD      CT brain attack head wo IV contrast   Final Result   Addendum (preliminary) 1 of 1   Interpreted By:  Rodney Helms,    ADDENDUM:   Rodney Helms discussed the significance and urgency of this   critical finding by secure chat with  DEON PETERSEN on 2/16/2025 at   1:29 pm.  (**-RCF-**) Findings:  See findings.             Signed by: Rodney Helms 2/16/2025 1:30 PM        -------- ORIGINAL REPORT --------   Dictation workstation:   [id number]      Final   No evidence of acute cortical infarct or  intracranial hemorrhage.        Atrophy and nonspecific low-density white matter changes.        Mucosal thickening paranasal sinuses with mild newly seen secretions   and/or fluid left maxillary and left sphenoid sinus. Correlate with   symptomatology.        MACRO:   None        Signed by: Rodney Helms 2/16/2025 1:21 PM   Dictation workstation:   [id number]            Physical Exam    Constitutional   General appearance: Bar check  Pulmonary   Respiratory assessment: No respiratory distress, normal respiratory rhythm and effort.    Auscultation of Lungs: Clear bilateral breath sounds.   Cardiovascular   Auscultation of heart: Apical pulse normal, heart rate and rhythm normal, normal S1 and S2, no murmurs and no pericardial rub.    Exam for edema: No peripheral edema.   Abdomen   Abdominal Exam: No bruits, normal bowel sounds, soft, non-tender, no abdominal mass palpated.    Liver and Spleen exam: No hepato-splenomegaly.   Musculoskeletal     Neurologic   Lethargic        Assessment/Plan      #Hypernatremia  #Encephalopathy  #Dehydration  #Bell's palsy  Continue D5 water  Monitor sodium regularly to avoid rapid drop in sodium  Nutrition consult   Speech therapy input noted  ?  Is he getting adequate nutrition or not    #Parkinson disease with dementia  Poor prognosis  We consulted palliative care    #Urine retention  Allison catheter placed  Monitor for kidney infections

## 2025-02-18 NOTE — PROGRESS NOTES
02/18/25 5357   Discharge Planning   Assistance Needed TCC/ SW following facilities for acceptance; TCC updated patient's cousin on discharge plan; she was at bedside; Fantasma stated she was patient's POA, also verbalized understanding about Palliative Care   Expected Discharge Disposition Inter   Does the patient need discharge transport arranged? Yes   RoundTrip coordination needed? Yes   Has discharge transport been arranged? No

## 2025-02-18 NOTE — PROGRESS NOTES
"Flakito Mcfarlane is a 85 y.o. male on day 2 of admission presenting with Hypernatremia.    Subjective   Symptoms (0 - 10, Best to Worst)  [place] Symptom Assessment System  0-10 (Numeric) Pain Score: 0 - No pain  Some improvement in hypernatremia, now 157.  Now w/ Allison from Urology.   No acute CVA or large vessel stenosis on MRI MRA of head and neck. Only chronic ischemic changes and chronic subdural hematomas.        2025 Conversation with Fantasma Jackson:   [facility] is home facility. Discussed current workup including MRI results.   He is usually comical/jovial when she visits at [facility] where they call him Uncle Flakito.  He was able to write his name recently, doesn't usually give his entire .   2 daughters live her and 1 is in the [place]. She was made HCPOA because she was most involved in his care.   He has been eating well but she reports she saw him lose weight recently. He feeds himself and really enjoys eating, sometimes having other peoples food.   Discussed PEG nutrition i/s/o dementia.  We discussed disease progression in dementia and reduced intake, trouble swallowing. Agreed that not eating can be sign that a person's body is readying itself.      Code status: He had previously said that \"he wants everything\" I explained that everyone wants everything done, but usually only the things that are helpful. In his state of health, I am concerned that CPR would cause pain and suffering at the end of his life.   She agreed that while he wanted everything done in the past, the situation has changed and she believes his views on what's helpful would be different.  She would like to talk to patient's daughters because she wants them to have a say.           Objective     Physical Exam    Constitutional:       Comments: Tired appearing. Wakes to voice. Answers question though only w/ single words. Reliability is unclear.    HENT:      Head: Atraumatic.      Mouth/Throat:      Mouth: Mucous membranes are " "moist.   Cardiovascular:      Rate and Rhythm: Normal rate and regular rhythm.   Pulmonary:      Effort: Pulmonary effort is normal.      Breath sounds: Normal breath sounds.   Abdominal:      General: Abdomen is flat. Bowel sounds are normal. There is no distension.      Tenderness: There is no abdominal tenderness. There is guarding.   Musculoskeletal:         General: No swelling.   Skin:     General: Skin is warm and dry.   Neurological:      Mental Status: He is disoriented. He gave me his birth year as current year and told me he still worked as a  in somewhat garbled speech.      Comments: LT facial droop. Cooperates with simple commands.      Last Recorded Vitals  Blood pressure 143/78, pulse 54, temperature 36.8 °C (98.2 °F), temperature source Temporal, resp. rate 20, height 1.753 m (5' 9.02\"), weight 70.3 kg (154 lb 14.4 oz), SpO2 94%.  Intake/Output last 3 Shifts:  I/O last 3 completed shifts:  In: 240 (3.4 mL/kg) [P.O.:240]  Out: - (0 mL/kg)   Weight: 70.3 kg     Wt Readings from Last 8 Encounters:   02/16/25 73 kg (160 lb 15 oz)   01/18/22 66.7 kg (147 lb)   12/08/21 70.4 kg (155 lb 3 oz)   09/22/21 72.6 kg (160 lb)   07/07/21 75.8 kg (167 lb)   05/11/21 72.6 kg (160 lb)   01/13/21 78.5 kg (173 lb)   12/31/20 80.7 kg (178 lb)   2/17/2025 Reweigh on bed, 71.5kg.     Relevant Results  Scheduled medications  bisacodyl, 10 mg, rectal, Daily  budesonide, 0.5 mg, nebulization, BID  carbidopa-levodopa, 3 tablet, oral, TID  enoxaparin, 40 mg, subcutaneous, q24h  memantine, 5 mg, oral, BID  pantoprazole, 40 mg, oral, Daily before breakfast   Or  pantoprazole, 40 mg, intravenous, Daily before breakfast  senna, 5 mL, oral, BID  tamsulosin, 0.4 mg, oral, Nightly      Continuous medications  dextrose 5%, 100 mL/hr, Last Rate: 100 mL/hr (02/18/25 0836)      PRN medications  PRN medications: acetaminophen **OR** acetaminophen **OR** acetaminophen, albuterol, guaiFENesin, LORazepam, melatonin, ondansetron " **OR** ondansetron, polyethylene glycol    Results for orders placed or performed during the hospital encounter of 02/16/25 (from the past 24 hours)   Basic metabolic panel   Result Value Ref Range    Glucose 97 74 - 99 mg/dL    Sodium 157 (H) 136 - 145 mmol/L    Potassium 3.4 (L) 3.5 - 5.3 mmol/L    Chloride 121 (H) 98 - 107 mmol/L    Bicarbonate 30 21 - 32 mmol/L    Anion Gap 9 (L) 10 - 20 mmol/L    Urea Nitrogen 22 6 - 23 mg/dL    Creatinine 1.13 0.50 - 1.30 mg/dL    eGFR 63 >60 mL/min/1.73m*2    Calcium 8.0 (L) 8.6 - 10.3 mg/dL   Urine electrolytes   Result Value Ref Range    Sodium, Urine Random 136 mmol/L    Sodium/Creatinine Ratio 211 Not established. mmol/g Creat    Potassium, Urine Random 39 mmol/L    Potassium/Creatinine Ratio 60 Not established mmol/g Creat    Chloride, Urine Random 169 mmol/L    Chloride/Creatinine Ratio 262 23 - 275 mmol/g creat    Creatinine, Urine Random 64.6 20.0 - 370.0 mg/dL   Basic metabolic panel   Result Value Ref Range    Glucose 89 74 - 99 mg/dL    Sodium 153 (H) 136 - 145 mmol/L    Potassium 3.4 (L) 3.5 - 5.3 mmol/L    Chloride 117 (H) 98 - 107 mmol/L    Bicarbonate 31 21 - 32 mmol/L    Anion Gap 8 (L) 10 - 20 mmol/L    Urea Nitrogen 19 6 - 23 mg/dL    Creatinine 0.99 0.50 - 1.30 mg/dL    eGFR 74 >60 mL/min/1.73m*2    Calcium 7.9 (L) 8.6 - 10.3 mg/dL   CBC and Auto Differential   Result Value Ref Range    WBC 5.1 4.4 - 11.3 x10*3/uL    nRBC 0.0 0.0 - 0.0 /100 WBCs    RBC 3.80 (L) 4.50 - 5.90 x10*6/uL    Hemoglobin 10.5 (L) 13.5 - 17.5 g/dL    Hematocrit 35.4 (L) 41.0 - 52.0 %    MCV 93 80 - 100 fL    MCH 27.6 26.0 - 34.0 pg    MCHC 29.7 (L) 32.0 - 36.0 g/dL    RDW 14.1 11.5 - 14.5 %    Platelets 126 (L) 150 - 450 x10*3/uL    Neutrophils % 53.2 40.0 - 80.0 %    Immature Granulocytes %, Automated 0.2 0.0 - 0.9 %    Lymphocytes % 31.3 13.0 - 44.0 %    Monocytes % 10.2 2.0 - 10.0 %    Eosinophils % 4.9 0.0 - 6.0 %    Basophils % 0.2 0.0 - 2.0 %    Neutrophils Absolute 2.70 1.60 -  "5.50 x10*3/uL    Immature Granulocytes Absolute, Automated 0.01 0.00 - 0.50 x10*3/uL    Lymphocytes Absolute 1.59 0.80 - 3.00 x10*3/uL    Monocytes Absolute 0.52 0.05 - 0.80 x10*3/uL    Eosinophils Absolute 0.25 0.00 - 0.40 x10*3/uL    Basophils Absolute 0.01 0.00 - 0.10 x10*3/uL                      Assessment/Plan   IMP:  84 yo M with PMH of parkison's disease and dementia presenting with LT face droop and AMS. Neurology is following for Bell's Palsy and to rule out CVA which they think is unlikely but for which patient is set to have an MRI. Nephrology is consulted for severe hypernatremia i/s/o of decreased intake. Palliative consulted for GOC.     2/18/2025 Conversation with Fantasma Jackson:   [facility] is home facility. Discussed current workup including MRI results.   He is usually comical/jovial when she visits at [facility] where they call him Uncle Flakito.  He was able to write his name recently, doesn't usually give his entire name.   2 daughters live her and 1 is in the [place]. She was made HCPOA because she was most involved in his care.   He has been eating well but she reports she saw him lose weight recently. He feeds himself and really enjoys eating, sometimes having other peoples food.   Discussed PEG nutrition i/s/o dementia.  We discussed disease progression.     Code status: He had previously said that \"he wants everything\" I explained that everyone wants everything done, but usually only the things that are helpful. In his state of health, I am concerned that CPR would cause pain and suffering at the end of his life.   She agreed that while he wanted everything done in the past, the situation has changed and she believes his views on what's helpful would be different.  She would like to talk to patient's daughters because she wants them to have a say.         Issues:  Bell's Palsy  Hypernatremia  Encephalopathy, metabolic  Hypernatremia, moderate/severe  Chronic anemia  FTT  TC on admission, " improved  PCM? Mild  Urinary retention        Plan:  - Continue aggressive treatment of acute issues.   - Ongoing conversations about code status. HCPOA would like to talk to daughters.   - I have discussed progression of dementia and concept of allowing patient's show us they are hungry and accepting that lack of hunger is part of the dying process.   - Patient had BM yesterday. I stopped bisacodyl suppository. Continue with senna liquid 10ml bid. Will titrate to 1 BM per day.   - Will continue to follow for GOC.         Patient/proxy preference for information  Prefers full information     Goals of Care  DNR DNI     Is the patient hospice-eligible?   Yes  Was a discussion held re hospice services?   no  Was a decision made re hospice services?  Unknown       I spent 50 minutes in the professional and overall care of this patient.  I spent 20 minutes in the professional and overall care of this patient related to ACP in addition to other time spent in assessment, chart review, and coordination of care        Matthew Steel MD

## 2025-02-19 LAB
ANION GAP SERPL CALC-SCNC: 8 MMOL/L (ref 10–20)
BASOPHILS # BLD AUTO: 0.01 X10*3/UL (ref 0–0.1)
BASOPHILS NFR BLD AUTO: 0.2 %
BUN SERPL-MCNC: 17 MG/DL (ref 6–23)
CALCIUM SERPL-MCNC: 7.9 MG/DL (ref 8.6–10.3)
CHLORIDE SERPL-SCNC: 113 MMOL/L (ref 98–107)
CO2 SERPL-SCNC: 30 MMOL/L (ref 21–32)
CREAT SERPL-MCNC: 0.99 MG/DL (ref 0.5–1.3)
EGFRCR SERPLBLD CKD-EPI 2021: 74 ML/MIN/1.73M*2
EOSINOPHIL # BLD AUTO: 0.17 X10*3/UL (ref 0–0.4)
EOSINOPHIL NFR BLD AUTO: 3.5 %
ERYTHROCYTE [DISTWIDTH] IN BLOOD BY AUTOMATED COUNT: 13.7 % (ref 11.5–14.5)
GLUCOSE SERPL-MCNC: 89 MG/DL (ref 74–99)
HCT VFR BLD AUTO: 38.3 % (ref 41–52)
HGB BLD-MCNC: 11.3 G/DL (ref 13.5–17.5)
IMM GRANULOCYTES # BLD AUTO: 0.02 X10*3/UL (ref 0–0.5)
IMM GRANULOCYTES NFR BLD AUTO: 0.4 % (ref 0–0.9)
LYMPHOCYTES # BLD AUTO: 1.6 X10*3/UL (ref 0.8–3)
LYMPHOCYTES NFR BLD AUTO: 33.4 %
MCH RBC QN AUTO: 27.8 PG (ref 26–34)
MCHC RBC AUTO-ENTMCNC: 29.5 G/DL (ref 32–36)
MCV RBC AUTO: 94 FL (ref 80–100)
MONOCYTES # BLD AUTO: 0.51 X10*3/UL (ref 0.05–0.8)
MONOCYTES NFR BLD AUTO: 10.6 %
NEUTROPHILS # BLD AUTO: 2.48 X10*3/UL (ref 1.6–5.5)
NEUTROPHILS NFR BLD AUTO: 51.9 %
NRBC BLD-RTO: 0 /100 WBCS (ref 0–0)
PLATELET # BLD AUTO: 119 X10*3/UL (ref 150–450)
POTASSIUM SERPL-SCNC: 3.7 MMOL/L (ref 3.5–5.3)
RBC # BLD AUTO: 4.06 X10*6/UL (ref 4.5–5.9)
SODIUM SERPL-SCNC: 147 MMOL/L (ref 136–145)
WBC # BLD AUTO: 4.8 X10*3/UL (ref 4.4–11.3)

## 2025-02-19 PROCEDURE — 99232 SBSQ HOSP IP/OBS MODERATE 35: CPT | Performed by: NURSE PRACTITIONER

## 2025-02-19 PROCEDURE — 80048 BASIC METABOLIC PNL TOTAL CA: CPT | Performed by: INTERNAL MEDICINE

## 2025-02-19 PROCEDURE — 2500000001 HC RX 250 WO HCPCS SELF ADMINISTERED DRUGS (ALT 637 FOR MEDICARE OP): Performed by: NURSE PRACTITIONER

## 2025-02-19 PROCEDURE — 2500000001 HC RX 250 WO HCPCS SELF ADMINISTERED DRUGS (ALT 637 FOR MEDICARE OP): Performed by: INTERNAL MEDICINE

## 2025-02-19 PROCEDURE — 99232 SBSQ HOSP IP/OBS MODERATE 35: CPT | Performed by: INTERNAL MEDICINE

## 2025-02-19 PROCEDURE — 2500000002 HC RX 250 W HCPCS SELF ADMINISTERED DRUGS (ALT 637 FOR MEDICARE OP, ALT 636 FOR OP/ED): Performed by: NURSE PRACTITIONER

## 2025-02-19 PROCEDURE — 1100000001 HC PRIVATE ROOM DAILY

## 2025-02-19 PROCEDURE — 94640 AIRWAY INHALATION TREATMENT: CPT

## 2025-02-19 PROCEDURE — 36415 COLL VENOUS BLD VENIPUNCTURE: CPT | Performed by: INTERNAL MEDICINE

## 2025-02-19 PROCEDURE — 2500000004 HC RX 250 GENERAL PHARMACY W/ HCPCS (ALT 636 FOR OP/ED): Performed by: NURSE PRACTITIONER

## 2025-02-19 PROCEDURE — 85025 COMPLETE CBC W/AUTO DIFF WBC: CPT | Performed by: INTERNAL MEDICINE

## 2025-02-19 PROCEDURE — 99497 ADVNCD CARE PLAN 30 MIN: CPT | Performed by: INTERNAL MEDICINE

## 2025-02-19 PROCEDURE — 99233 SBSQ HOSP IP/OBS HIGH 50: CPT | Performed by: INTERNAL MEDICINE

## 2025-02-19 PROCEDURE — 2500000004 HC RX 250 GENERAL PHARMACY W/ HCPCS (ALT 636 FOR OP/ED): Performed by: INTERNAL MEDICINE

## 2025-02-19 PROCEDURE — 2500000002 HC RX 250 W HCPCS SELF ADMINISTERED DRUGS (ALT 637 FOR MEDICARE OP, ALT 636 FOR OP/ED): Performed by: INTERNAL MEDICINE

## 2025-02-19 RX ORDER — MIRTAZAPINE 15 MG/1
7.5 TABLET, FILM COATED ORAL NIGHTLY
Status: DISCONTINUED | OUTPATIENT
Start: 2025-02-19 | End: 2025-02-21 | Stop reason: HOSPADM

## 2025-02-19 RX ADMIN — CARBIDOPA AND LEVODOPA 3 TABLET: 25; 100 TABLET ORAL at 16:21

## 2025-02-19 RX ADMIN — SENNOSIDES 5 ML: 8.8 LIQUID ORAL at 20:16

## 2025-02-19 RX ADMIN — PANTOPRAZOLE SODIUM 40 MG: 40 TABLET, DELAYED RELEASE ORAL at 05:52

## 2025-02-19 RX ADMIN — SENNOSIDES 5 ML: 8.8 LIQUID ORAL at 08:20

## 2025-02-19 RX ADMIN — DEXTROSE MONOHYDRATE 75 ML/HR: 50 INJECTION, SOLUTION INTRAVENOUS at 03:14

## 2025-02-19 RX ADMIN — BUDESONIDE 0.5 MG: 0.5 INHALANT RESPIRATORY (INHALATION) at 19:28

## 2025-02-19 RX ADMIN — CARBIDOPA AND LEVODOPA 3 TABLET: 25; 100 TABLET ORAL at 08:20

## 2025-02-19 RX ADMIN — BUDESONIDE 0.5 MG: 0.5 INHALANT RESPIRATORY (INHALATION) at 07:24

## 2025-02-19 RX ADMIN — MIRTAZAPINE 7.5 MG: 15 TABLET, FILM COATED ORAL at 20:16

## 2025-02-19 RX ADMIN — ENOXAPARIN SODIUM 40 MG: 100 INJECTION SUBCUTANEOUS at 16:22

## 2025-02-19 RX ADMIN — MEMANTINE 5 MG: 5 TABLET ORAL at 20:16

## 2025-02-19 RX ADMIN — MEMANTINE 5 MG: 5 TABLET ORAL at 08:20

## 2025-02-19 RX ADMIN — CARBIDOPA AND LEVODOPA 3 TABLET: 25; 100 TABLET ORAL at 20:16

## 2025-02-19 RX ADMIN — TAMSULOSIN HYDROCHLORIDE 0.4 MG: 0.4 CAPSULE ORAL at 20:16

## 2025-02-19 ASSESSMENT — COGNITIVE AND FUNCTIONAL STATUS - GENERAL
STANDING UP FROM CHAIR USING ARMS: TOTAL
EATING MEALS: TOTAL
EATING MEALS: TOTAL
DRESSING REGULAR UPPER BODY CLOTHING: TOTAL
TOILETING: TOTAL
PERSONAL GROOMING: TOTAL
MOVING FROM LYING ON BACK TO SITTING ON SIDE OF FLAT BED WITH BEDRAILS: TOTAL
WALKING IN HOSPITAL ROOM: TOTAL
WALKING IN HOSPITAL ROOM: TOTAL
MOVING FROM LYING ON BACK TO SITTING ON SIDE OF FLAT BED WITH BEDRAILS: TOTAL
PATIENT BASELINE BEDBOUND: YES
MOVING TO AND FROM BED TO CHAIR: TOTAL
STANDING UP FROM CHAIR USING ARMS: TOTAL
TOILETING: TOTAL
TURNING FROM BACK TO SIDE WHILE IN FLAT BAD: TOTAL
DRESSING REGULAR LOWER BODY CLOTHING: TOTAL
DRESSING REGULAR UPPER BODY CLOTHING: TOTAL
CLIMB 3 TO 5 STEPS WITH RAILING: TOTAL
DAILY ACTIVITIY SCORE: 8
TURNING FROM BACK TO SIDE WHILE IN FLAT BAD: TOTAL
MOBILITY SCORE: 7
HELP NEEDED FOR BATHING: TOTAL
MOBILITY SCORE: 6
DAILY ACTIVITIY SCORE: 6
MOVING TO AND FROM BED TO CHAIR: TOTAL
DRESSING REGULAR LOWER BODY CLOTHING: TOTAL
HELP NEEDED FOR BATHING: TOTAL
PERSONAL GROOMING: A LITTLE
CLIMB 3 TO 5 STEPS WITH RAILING: A LOT

## 2025-02-19 ASSESSMENT — ACTIVITIES OF DAILY LIVING (ADL)
FEEDING YOURSELF: DEPENDENT
PATIENT'S MEMORY ADEQUATE TO SAFELY COMPLETE DAILY ACTIVITIES?: NO
HEARING - LEFT EAR: FUNCTIONAL
HEARING - RIGHT EAR: FUNCTIONAL
ADEQUATE_TO_COMPLETE_ADL: NO
DRESSING YOURSELF: DEPENDENT
ASSISTIVE_DEVICE: TRANSFER BOARD
BATHING: DEPENDENT
WALKS IN HOME: DEPENDENT
TOILETING: DEPENDENT
JUDGMENT_ADEQUATE_SAFELY_COMPLETE_DAILY_ACTIVITIES: NO
GROOMING: DEPENDENT

## 2025-02-19 ASSESSMENT — PAIN - FUNCTIONAL ASSESSMENT: PAIN_FUNCTIONAL_ASSESSMENT: 0-10

## 2025-02-19 ASSESSMENT — PAIN SCALES - GENERAL: PAINLEVEL_OUTOF10: 0 - NO PAIN

## 2025-02-19 NOTE — PROGRESS NOTES
Flakito Mcfarlane is a 85 y.o. male on day 3 of admission presenting with Hypernatremia.      Subjective   Doing better, verbally communicative but still confused with sodium level gradually decreasing and normalizing       Objective                  Vitals 24HR  Heart Rate:  []   Temp:  [36.5 °C (97.7 °F)-37.4 °C (99.3 °F)]   Resp:  [14-17]   BP: (117-145)/(61-88)   SpO2:  [91 %-96 %]     Intake/Output last 3 Shifts:    Intake/Output Summary (Last 24 hours) at 2/19/2025 1242  Last data filed at 2/19/2025 1139  Gross per 24 hour   Intake 850 ml   Output 1125 ml   Net -275 ml       Physical Exam      Appearance: interactive and arousable with verbal or physical stimulation  Head and ENT: Normocephalic/atraumatic/supple neck/no JVD  Lungs; CTA  Heart: RRR  Abdomen; soft no tenderness organomegaly  Extremities; no edema  Neurologic: Altered mental status with left ischial droop         Relevant Results     Results from last 7 days   Lab Units 02/19/25  0627 02/18/25  0704 02/17/25  0445   WBC AUTO x10*3/uL 4.8 5.1 6.0   HEMOGLOBIN g/dL 11.3* 10.5* 10.4*   HEMATOCRIT % 38.3* 35.4* 36.0*   PLATELETS AUTO x10*3/uL 119* 126* 150      Results from last 7 days   Lab Units 02/19/25  0627 02/18/25  0704 02/17/25  1418   SODIUM mmol/L 147* 153* 157*   POTASSIUM mmol/L 3.7 3.4* 3.4*   CHLORIDE mmol/L 113* 117* 121*   CO2 mmol/L 30 31 30   BUN mg/dL 17 19 22   CREATININE mg/dL 0.99 0.99 1.13   GLUCOSE mg/dL 89 89 97   CALCIUM mg/dL 7.9* 7.9* 8.0*        Current Facility-Administered Medications:     acetaminophen (Tylenol) tablet 650 mg, 650 mg, oral, q4h PRN **OR** acetaminophen (Tylenol) oral liquid 650 mg, 650 mg, oral, q4h PRN, 650 mg at 02/17/25 2100 **OR** acetaminophen (Tylenol) suppository 650 mg, 650 mg, rectal, q4h PRN, Luis M Booker MD    albuterol 2.5 mg /3 mL (0.083 %) nebulizer solution 2.5 mg, 2.5 mg, nebulization, q2h PRN, Gennaro Araujo MD    bisacodyl (Dulcolax) suppository 10 mg, 10 mg, rectal, Daily PRN,  Matthew Steel MD    budesonide (Pulmicort) 0.5 mg/2 mL nebulizer solution 0.5 mg, 0.5 mg, nebulization, BID, Luis M Booker MD, 0.5 mg at 02/19/25 0724    carbidopa-levodopa (Sinemet)  mg per tablet 3 tablet, 3 tablet, oral, TID, Dimple Washington, APRN-CNP, 3 tablet at 02/19/25 0820    enoxaparin (Lovenox) syringe 40 mg, 40 mg, subcutaneous, q24h, Luis M Booker MD, 40 mg at 02/18/25 1501    guaiFENesin (Mucinex) 12 hr tablet 600 mg, 600 mg, oral, q12h PRN, Luis M Booker MD    LORazepam (Ativan) injection 1 mg, 1 mg, intravenous, q8h PRN, Luis M Booker MD, 1 mg at 02/17/25 0200    melatonin tablet 3 mg, 3 mg, oral, Nightly PRN, Luis M Booker MD, 3 mg at 02/18/25 2033    memantine (Namenda) tablet 5 mg, 5 mg, oral, BID, Luis M Booker MD, 5 mg at 02/19/25 0820    ondansetron (Zofran) tablet 4 mg, 4 mg, oral, q8h PRN **OR** ondansetron (Zofran) injection 4 mg, 4 mg, intravenous, q8h PRN, Luis M Booker MD    pantoprazole (ProtoNix) EC tablet 40 mg, 40 mg, oral, Daily before breakfast, 40 mg at 02/19/25 0552 **OR** pantoprazole (ProtoNix) injection 40 mg, 40 mg, intravenous, Daily before breakfast, Luis M Booker MD, 40 mg at 02/18/25 0624    polyethylene glycol (Glycolax, Miralax) packet 17 g, 17 g, oral, Daily PRN, Luis M Booker MD    senna (Senokot) 8.8 mg/5 mL syrup 5 mL, 5 mL, oral, BID, Matthew Steel MD, 5 mL at 02/19/25 0820    tamsulosin (Flomax) 24 hr capsule 0.4 mg, 0.4 mg, oral, Nightly, Dimple Washington BIANKA-CNP, 0.4 mg at 02/18/25 2033           Assessment/Plan        1.  TC on top of CKD, gradual resolving  No history of obstruction or renal calculi noted by ultrasound  2.  Hypernatremia due to lack of free water intake, started on D5W at 75 cc/h  Sodium level now thought to 147 mmol/L  3.  Urinary bladder retention of 505 ml, was evaluated initially  Replace for Allison placement  4.  Mental status right CVA will follow with the medical team     CBC and BMP daily review all the labs examined  patient and all consultants input         Assessment & Plan  Hypernatremia           I spent 35 minutes in the professional and overall care of this patient.      Bobo Wang MD

## 2025-02-19 NOTE — PROGRESS NOTES
Flakito Mcfarlane is a 85 y.o. male     Sodium is improving  Now the patient is verbal and does respond to questions  Will add mirtazapine as appetite remains poor in an effort to stimulate the appetite    Review of Systems           Vitals:    02/19/25 1117   BP: 129/69   Pulse:    Resp:    Temp: 36.9 °C (98.4 °F)   SpO2: 96%        Scheduled medications  budesonide, 0.5 mg, nebulization, BID  carbidopa-levodopa, 3 tablet, oral, TID  enoxaparin, 40 mg, subcutaneous, q24h  memantine, 5 mg, oral, BID  pantoprazole, 40 mg, oral, Daily before breakfast   Or  pantoprazole, 40 mg, intravenous, Daily before breakfast  senna, 5 mL, oral, BID  tamsulosin, 0.4 mg, oral, Nightly      Continuous medications       PRN medications  PRN medications: acetaminophen **OR** acetaminophen **OR** acetaminophen, albuterol, bisacodyl, guaiFENesin, LORazepam, melatonin, ondansetron **OR** ondansetron, polyethylene glycol    Lab Review   Results from last 7 days   Lab Units 02/19/25  0627 02/18/25  0704 02/17/25  0445   WBC AUTO x10*3/uL 4.8 5.1 6.0   HEMOGLOBIN g/dL 11.3* 10.5* 10.4*   HEMATOCRIT % 38.3* 35.4* 36.0*   PLATELETS AUTO x10*3/uL 119* 126* 150     Results from last 7 days   Lab Units 02/19/25  0627 02/18/25  0704 02/17/25  1418 02/17/25  0445 02/16/25  1333   SODIUM mmol/L 147* 153* 157*   < > 160*   POTASSIUM mmol/L 3.7 3.4* 3.4*   < > 4.1   CHLORIDE mmol/L 113* 117* 121*   < > 125*   CO2 mmol/L 30 31 30   < > 30   BUN mg/dL 17 19 22   < > 37*   CREATININE mg/dL 0.99 0.99 1.13   < > 1.72*   CALCIUM mg/dL 7.9* 7.9* 8.0*   < > 8.4*   PROTEIN TOTAL g/dL  --   --   --   --  5.9*   BILIRUBIN TOTAL mg/dL  --   --   --   --  0.7   ALK PHOS U/L  --   --   --   --  62   ALT U/L  --   --   --   --  <3*   AST U/L  --   --   --   --  13   GLUCOSE mg/dL 89 89 97   < > 92    < > = values in this interval not displayed.     Results from last 7 days   Lab Units 02/16/25  1333   TROPHS ng/L 10        MR brain wo IV contrast   Final Result   1. No  evidence of acute infarct, intracranial mass effect or   significant midline shift.   2. Mild to moderate white matter FLAIR signal increase, most commonly   seen with chronic small vessel ischemic change.  Presence of older   small infarctions in these areas not excluded.   3. Suspected old lacunar infarction inferior right lentiform nucleus.   4. Shallow fusiform prominence of the extra-axial spaces overlying   the right frontal convexity and right lateral cerebellar hemisphere   follows CSF signal intensity on all sequences. There is very mild   right-to-left midline shift in the supratentorial compartment.   Suspect chronic subdural hematomas or hygromas.             MACRO:   None        Signed by: Flakito Roman 2/17/2025 6:14 PM   Dictation workstation:   [id number]      MR angio head wo IV contrast   Final Result   1.  No evidence for large branch vessel cutoffs of the visualized   intracranial arterial vasculature.   2.  No definite aneurysm.        MACRO:   None        Signed by: Flakito Roman 2/17/2025 6:17 PM   Dictation workstation:   [id number]      MR angio neck wo IV contrast   Final Result   No evidence of significant stenosis on MRA of the neck.        MACRO:   None        Signed by: Flakito Roman 2/17/2025 6:19 PM   Dictation workstation:   [id number]      US renal complete   Final Result   No hydronephrosis bilaterally.    Right renal cyst.   Incidental finding: There are multiple stones within the gallbladder.   Signed by Christiano Loredo MD      XR chest 1 view   Final Result   Stable exam. No detectable active cardiopulmonary disease.   Signed by Seth Moise MD      CT brain attack head wo IV contrast   Final Result   Addendum (preliminary) 1 of 1   Interpreted By:  Rodney Helms,    ADDENDUM:   Rodney Helms discussed the significance and urgency of this   critical finding by secure chat with  DEON PETERSEN on 2/16/2025 at   1:29 pm.  (**-RCF-**) Findings:  See findings.              Signed by: Rodney Helms 2/16/2025 1:30 PM        -------- ORIGINAL REPORT --------   Dictation workstation:   [id number]      Final   No evidence of acute cortical infarct or intracranial hemorrhage.        Atrophy and nonspecific low-density white matter changes.        Mucosal thickening paranasal sinuses with mild newly seen secretions   and/or fluid left maxillary and left sphenoid sinus. Correlate with   symptomatology.        MACRO:   None        Signed by: Rodney Helms 2/16/2025 1:21 PM   Dictation workstation:   [id number]            Physical Exam    Constitutional   General appearance: Alert  Pulmonary   Respiratory assessment: No respiratory distress, normal respiratory rhythm and effort.    Auscultation of Lungs: Clear bilateral breath sounds.   Cardiovascular   Auscultation of heart: Apical pulse normal, heart rate and rhythm normal, normal S1 and S2, no murmurs and no pericardial rub.    Exam for edema: No peripheral edema.   Abdomen   Abdominal Exam: No bruits, normal bowel sounds, soft, non-tender, no abdominal mass palpated.    Liver and Spleen exam: No hepato-splenomegaly.   Musculoskeletal     Neurologic   Alert x 1-2        Assessment/Plan      #Hypernatremia  #Encephalopathy  #Dehydration  #Bell's palsy  Improving sodium  Encephalopathy appears to be resolving  The patient is more verbal    #Parkinson disease with dementia  Poor appetite remains  We will start mirtazapine to see if that helps stimulate his appetite    #Urine retention  Allison catheter placed  Monitor for kidney infections

## 2025-02-19 NOTE — CARE PLAN
The patient's goals for the shift include      The clinical goals for the shift include Patient will remain free from injury throughout this shift      Problem: Fall/Injury  Goal: Not fall by end of shift  Outcome: Progressing     Problem: Skin  Goal: Promote skin healing  Outcome: Progressing  Flowsheets (Taken 2/18/2025 2311)  Promote skin healing:   Protective dressings over bony prominences   Turn/reposition every 2 hours/use positioning/transfer devices

## 2025-02-19 NOTE — DOCUMENTATION CLARIFICATION NOTE
PATIENT:               FRENCH ROBLES  ACCT #:                  [id number]  MRN:                       [id number]  :                       1940  ADMIT DATE:       2025 12:47 PM  DISCH DATE:  RESPONDING PROVIDER #:        [id number]          PROVIDER RESPONSE TEXT:    I agree with the diagnosis of Severe malnutrition related to chronic disease or condition per Dietician note 25    CDI QUERY TEXT:    Clarification        Instruction:    Based on your assessment of the patient and the clinical information, please provide the requested documentation by clicking on the appropriate radio button and enter any additional information if prompted.      Question: Please further clarify this patient nutritional status as      When answering this query, please exercise your independent professional judgment. The fact that a question is being asked, does not imply that any particular answer is desired or expected.    The patient's clinical indicators include:  Clinical Information:  84 y/o [place] [facility] [place] left facial droop.. DX Bell's Palsy, Metabolic Encephalopathy, TC.      Clinical Indicators:  25 Dietician note per CARLOS Larios RDN, LD: ?Malnutrition Diagnosis: Severe malnutrition related to chronic disease or condition Related to: declining health; AMS As Evidenced by: Greater than 5% weight loss in 1 month, declined po intake over the past 2 days (average <50%) and severe muscle loss. Additional Assessment Information: Muscle loss may be r/t advanced age.?    Admission vitals : T 36.9, HR 65, RR 14, SpO2 93, 126/75  BMI 22.86    Labs  - :  Calcium 8.4 - 8.1 - 8.0 - 7.9 - 7.9  Albumin 3.4  Total Protein 5.9     per Palliative ?FTT? / ?PCM? Mild?     per Dietician ?Nutrition Focused Physical Findings: Orbital Fat Pads: Well nourished slightly bulging fat pads, Buccal Fat Pads: Well nourished full, rounded cheeks? / ?Temporalis: Severe hollowed scooping depression,  Pectoralis Clavicular Region: Severe protruding prominent clavicle?      Treatment: Dietician consult, Ensure supplement TID, PO Potassium replacement 02/17 - 02/18  Risk Factors: PMH: HTN, Parkinson's Disease, Dementia - Baseline A+Ox1, BPH  Options provided:  -- I agree with the diagnosis of Severe malnutrition related to chronic disease or condition per Dietician note 02/18/25  -- I do not agree with the diagnosis of Severe malnutrition related to chronic disease or condition per Dietician note 02/18/25  -- Other - I will add my own diagnosis  -- Refer to Clinical Documentation Reviewer    Query created by: Jayne Morales on 2/19/2025 11:58 AM      Electronically signed by:  JERICA MEDEL MD 2/19/2025 12:30 PM

## 2025-02-19 NOTE — DOCUMENTATION CLARIFICATION NOTE
PATIENT:               FRENCH ROBLES  ACCT #:                  [id number]  MRN:                       [id number]  :                       1940  ADMIT DATE:       2025 12:47 PM  DISCH DATE:  RESPONDING PROVIDER #:        [id number]          PROVIDER RESPONSE TEXT:    Hypernatremia    CDI QUERY TEXT:    Clarification        Instruction:    Based on your assessment of the patient and the clinical information, please provide the requested documentation by clicking on the appropriate radio button and enter any additional information if prompted.      Question: Based on your medical judgment, can you please clarify which of these conditions is the most clinically supported      When answering this query, please exercise your independent professional judgment. The fact that a question is being asked, does not imply that any particular answer is desired or expected.    The patient's clinical indicators include:  Clinical Information:  84 y/o [place] [facility] [place] left facial droop.. DX Bell's Palsy, Metabolic Encephalopathy, TC.      Documented Diagnoses: There is conflicting documentation in the medical record which requires clarification.  - 25 ED note per TAHMINA Araujo MD: ?#4 hyponatremia?  - 25 H&P note per DEBORAH Booker MD: ?Also noted to have significant hypernatremia?  - 25 Medicine note per BIANKA Gutierrez-CNP: ?Hypernatremia? / ?encephalopathy is likely 2/2 above, improving as hypernatremia improves?      Clinical Indicators:  Labs  - :  Sodium 160 - 160 - 157 - 153 - 147     per Nephrology ?Noninteractive and unarousable with verbal or physical stimulation? / ?Altered mental status with left ischial droop?    Vitals trend  - : T 36.4 - 37.1, HR 49 - 127, RR 14 - 20, SpO2 91 - 100, SBP 94 - 167      Treatment: IVF D5W  - , Daily lab monitoring  Risk Factors: PMH: HTN, Parkinson's Disease, Dementia - Baseline A+Ox1, BPH  Options provided:  --  Hypernatremia  -- Hyponatremia  -- Other - I will add my own diagnosis  -- Refer to Clinical Documentation Reviewer    Query created by: Jayne Morales on 2/19/2025 12:00 PM      Electronically signed by:  JERICA MEDEL MD 2/19/2025 12:30 PM

## 2025-02-19 NOTE — PROGRESS NOTES
"Flakito Mcfarlane is a 85 y.o. male on day 3 of admission presenting with Hypernatremia.    Subjective   Symptoms (0 - 10, Best to Worst)  [place] Symptom Assessment System  0-10 (Numeric) Pain Score: 0 - No pain  Hypernatremia nearing resolution. Patient eating less today, also less interactive.        Conversation with STORMY Meek:  Discussed again about aspiration risk. She had discussed code status with family. One daughter completely agreed, the other said she didn't want to be responsible. We agreed that ultimately the decision is hers and she would want him to pass peacefully when he does.   I told her I would change code status here and put a paper copy for his chart when he leaves.   We also discussed hospice as a means of getting some more support for him at [facility]. I will place that consult for hospice here.          Objective     Physical Exam    Constitutional:       Comments: Tired appearing. Wakes to voice. Answers with short phrases, single words.   HENT:      Head: Atraumatic.      Mouth/Throat:      Mouth: Mucous membranes are moist.   Cardiovascular:      Rate and Rhythm: Normal rate and regular rhythm.   Pulmonary:      Effort: Pulmonary effort is normal.      Breath sounds: Normal breath sounds.   Abdominal:      General: Abdomen is flat. Bowel sounds are normal. There is no distension.      Tenderness: There is no abdominal tenderness. There is guarding.   Musculoskeletal:         General: No swelling.   Skin:     General: Skin is warm and dry.   Neurological:      Mental Status: He is disoriented.      Comments: LT facial droop. Cooperates with simple commands.      Last Recorded Vitals  Blood pressure 129/69, pulse 58, temperature 36.9 °C (98.4 °F), temperature source Temporal, resp. rate 17, height 1.753 m (5' 9.02\"), weight 70.3 kg (154 lb 14.4 oz), SpO2 96%.  Intake/Output last 3 Shifts:  I/O last 3 completed shifts:  In: 1330 (18.9 mL/kg) [P.O.:480; I.V.:850 (12.1 mL/kg)]  Out: 1100 (15.7 " mL/kg) [Urine:1100 (0.4 mL/kg/hr)]  Weight: 70.3 kg     Wt Readings from Last 8 Encounters:   02/16/25 73 kg (160 lb 15 oz)   01/18/22 66.7 kg (147 lb)   12/08/21 70.4 kg (155 lb 3 oz)   09/22/21 72.6 kg (160 lb)   07/07/21 75.8 kg (167 lb)   05/11/21 72.6 kg (160 lb)   01/13/21 78.5 kg (173 lb)   12/31/20 80.7 kg (178 lb)   2/17/2025 Reweigh on bed, 71.5kg.     Relevant Results  Scheduled medications  budesonide, 0.5 mg, nebulization, BID  carbidopa-levodopa, 3 tablet, oral, TID  enoxaparin, 40 mg, subcutaneous, q24h  memantine, 5 mg, oral, BID  mirtazapine, 7.5 mg, oral, Nightly  pantoprazole, 40 mg, oral, Daily before breakfast   Or  pantoprazole, 40 mg, intravenous, Daily before breakfast  senna, 5 mL, oral, BID  tamsulosin, 0.4 mg, oral, Nightly      Continuous medications       PRN medications  PRN medications: acetaminophen **OR** acetaminophen **OR** acetaminophen, albuterol, bisacodyl, guaiFENesin, LORazepam, melatonin, ondansetron **OR** ondansetron, polyethylene glycol    Results for orders placed or performed during the hospital encounter of 02/16/25 (from the past 24 hours)   Basic metabolic panel   Result Value Ref Range    Glucose 89 74 - 99 mg/dL    Sodium 147 (H) 136 - 145 mmol/L    Potassium 3.7 3.5 - 5.3 mmol/L    Chloride 113 (H) 98 - 107 mmol/L    Bicarbonate 30 21 - 32 mmol/L    Anion Gap 8 (L) 10 - 20 mmol/L    Urea Nitrogen 17 6 - 23 mg/dL    Creatinine 0.99 0.50 - 1.30 mg/dL    eGFR 74 >60 mL/min/1.73m*2    Calcium 7.9 (L) 8.6 - 10.3 mg/dL   CBC and Auto Differential   Result Value Ref Range    WBC 4.8 4.4 - 11.3 x10*3/uL    nRBC 0.0 0.0 - 0.0 /100 WBCs    RBC 4.06 (L) 4.50 - 5.90 x10*6/uL    Hemoglobin 11.3 (L) 13.5 - 17.5 g/dL    Hematocrit 38.3 (L) 41.0 - 52.0 %    MCV 94 80 - 100 fL    MCH 27.8 26.0 - 34.0 pg    MCHC 29.5 (L) 32.0 - 36.0 g/dL    RDW 13.7 11.5 - 14.5 %    Platelets 119 (L) 150 - 450 x10*3/uL    Neutrophils % 51.9 40.0 - 80.0 %    Immature Granulocytes %, Automated 0.4 0.0  "- 0.9 %    Lymphocytes % 33.4 13.0 - 44.0 %    Monocytes % 10.6 2.0 - 10.0 %    Eosinophils % 3.5 0.0 - 6.0 %    Basophils % 0.2 0.0 - 2.0 %    Neutrophils Absolute 2.48 1.60 - 5.50 x10*3/uL    Immature Granulocytes Absolute, Automated 0.02 0.00 - 0.50 x10*3/uL    Lymphocytes Absolute 1.60 0.80 - 3.00 x10*3/uL    Monocytes Absolute 0.51 0.05 - 0.80 x10*3/uL    Eosinophils Absolute 0.17 0.00 - 0.40 x10*3/uL    Basophils Absolute 0.01 0.00 - 0.10 x10*3/uL                     Assessment/Plan   IMP:  86 yo M with PMH of parkison's disease and dementia presenting with LT face droop and AMS. Neurology is following for Bell's Palsy and to rule out CVA which they think is unlikely but for which patient is set to have an MRI. Nephrology is consulted for severe hypernatremia i/s/o of decreased intake. Palliative consulted for GOC.     2/18/2025 Conversation with Fantasma Jackson:   [facility] is home facility. Discussed current workup including MRI results.   He is usually comical/jovial when she visits at [facility] where they call him Uncle Flakito.  He was able to write his name recently, doesn't usually give his entire name.   2 daughters live her and 1 is in the [place]. She was made HCPOA because she was most involved in his care.   He has been eating well but she reports she saw him lose weight recently. He feeds himself and really enjoys eating, sometimes having other peoples food.   Discussed PEG nutrition i/s/o dementia.  We discussed disease progression.     Code status: He had previously said that \"he wants everything\" I explained that everyone wants everything done, but usually only the things that are helpful. In his state of health, I am concerned that CPR would cause pain and suffering at the end of his life.   She agreed that while he wanted everything done in the past, the situation has changed and she believes his views on what's helpful would be different.  She would like to talk to patient's daughters because " she wants them to have a say.         Issues:  Bell's Palsy  Hypernatremia  Encephalopathy, metabolic  Hypernatremia, moderate/severe  Chronic anemia  FTT  TC on admission, improved  PCM? Mild  Urinary retention        Plan:  - Continue aggressive treatment of acute issues.   - DNR DNI starting 2/19/25  - Hospice consult placed. D/w [facility] [place].   - Continue with senna liquid 10ml bid. Will titrate to 1 BM per day.   - Will continue to follow for GOC.         Patient/proxy preference for information  Prefers full information     Goals of Care  DNR DNI     Is the patient hospice-eligible?   Yes  Was a discussion held re hospice services?   no  Was a decision made re hospice services?  Unknown       I spent 20 minutes in the professional and overall care of this patient.  I spent 20 minutes in the professional and overall care of this patient related to ACP in addition to other time spent in assessment, chart review, and coordination of care        Matthew Steel MD

## 2025-02-19 NOTE — PROGRESS NOTES
"Flakito Mcfarlane is a 85 y.o. male on day 3 of admission presenting with Hypernatremia.    Subjective     Alert today.  Opens eyes upon walking in the room.  Oriented x 1. + dysathria and L facial droop again noted. Able to follow commands.    States to questions \"Im ok\"  Sodium and creatine better  Palliative following and in the afternoon hospice consult placed and DNR/DNI    Mirtazepem for appetite     Objective     Physical Exam  Constitutional:       Appearance: He is ill-appearing.   HENT:      Mouth/Throat:      Mouth: Mucous membranes are moist.   Cardiovascular:      Rate and Rhythm: Regular rhythm.   Pulmonary:      Breath sounds: Normal breath sounds.      Comments: Poor at following directions   Abdominal:      Palpations: Abdomen is soft.   Genitourinary:     Comments: Allison   Musculoskeletal:         General: Normal range of motion.   Skin:     General: Skin is warm and dry.   Neurological:      Mental Status: He is alert.      Comments: Alert times 1, + dysathria, + left facial droop  States 'I'm ok \" at interval questions            Last Recorded Vitals  Blood pressure 115/57, pulse 58, temperature 36.3 °C (97.3 °F), temperature source Oral, resp. rate 17, height 1.753 m (5' 9.02\"), weight 70.3 kg (154 lb 14.4 oz), SpO2 95%.  Intake/Output last 3 Shifts:  I/O last 3 completed shifts:  In: 1330 (18.9 mL/kg) [P.O.:480; I.V.:850 (12.1 mL/kg)]  Out: 1100 (15.7 mL/kg) [Urine:1100 (0.4 mL/kg/hr)]  Weight: 70.3 kg     Relevant Results      Scheduled medications  budesonide, 0.5 mg, nebulization, BID  carbidopa-levodopa, 3 tablet, oral, TID  enoxaparin, 40 mg, subcutaneous, q24h  memantine, 5 mg, oral, BID  mirtazapine, 7.5 mg, oral, Nightly  pantoprazole, 40 mg, oral, Daily before breakfast   Or  pantoprazole, 40 mg, intravenous, Daily before breakfast  senna, 5 mL, oral, BID  tamsulosin, 0.4 mg, oral, Nightly      Continuous medications       PRN medications  PRN medications: acetaminophen **OR** acetaminophen " **OR** acetaminophen, albuterol, bisacodyl, guaiFENesin, LORazepam, melatonin, ondansetron **OR** ondansetron, polyethylene glycol     Results for orders placed or performed during the hospital encounter of 02/16/25 (from the past 24 hours)   Basic metabolic panel   Result Value Ref Range    Glucose 89 74 - 99 mg/dL    Sodium 147 (H) 136 - 145 mmol/L    Potassium 3.7 3.5 - 5.3 mmol/L    Chloride 113 (H) 98 - 107 mmol/L    Bicarbonate 30 21 - 32 mmol/L    Anion Gap 8 (L) 10 - 20 mmol/L    Urea Nitrogen 17 6 - 23 mg/dL    Creatinine 0.99 0.50 - 1.30 mg/dL    eGFR 74 >60 mL/min/1.73m*2    Calcium 7.9 (L) 8.6 - 10.3 mg/dL   CBC and Auto Differential   Result Value Ref Range    WBC 4.8 4.4 - 11.3 x10*3/uL    nRBC 0.0 0.0 - 0.0 /100 WBCs    RBC 4.06 (L) 4.50 - 5.90 x10*6/uL    Hemoglobin 11.3 (L) 13.5 - 17.5 g/dL    Hematocrit 38.3 (L) 41.0 - 52.0 %    MCV 94 80 - 100 fL    MCH 27.8 26.0 - 34.0 pg    MCHC 29.5 (L) 32.0 - 36.0 g/dL    RDW 13.7 11.5 - 14.5 %    Platelets 119 (L) 150 - 450 x10*3/uL    Neutrophils % 51.9 40.0 - 80.0 %    Immature Granulocytes %, Automated 0.4 0.0 - 0.9 %    Lymphocytes % 33.4 13.0 - 44.0 %    Monocytes % 10.6 2.0 - 10.0 %    Eosinophils % 3.5 0.0 - 6.0 %    Basophils % 0.2 0.0 - 2.0 %    Neutrophils Absolute 2.48 1.60 - 5.50 x10*3/uL    Immature Granulocytes Absolute, Automated 0.02 0.00 - 0.50 x10*3/uL    Lymphocytes Absolute 1.60 0.80 - 3.00 x10*3/uL    Monocytes Absolute 0.51 0.05 - 0.80 x10*3/uL    Eosinophils Absolute 0.17 0.00 - 0.40 x10*3/uL    Basophils Absolute 0.01 0.00 - 0.10 x10*3/uL       MR angio neck wo IV contrast    Result Date: 2/17/2025  Interpreted By:  Flakito Roman, STUDY: MR ANGIO NECK WO IV CONTRAST;  2/17/2025 5:56 pm   INDICATION: Signs/Symptoms:left facial droop.     COMPARISON: None.   ACCESSION NUMBER(S): [id number]   ORDERING CLINICIAN: LU LIZ   TECHNIQUE: Time of flight MRA of the neck was performed. The images were reviewed as source images and  maximum intensity projections.   FINDINGS: The source images are mildly degraded by artifact.   Right carotid vessels:  There is expected flow signal in the visualized portion of the common carotid artery.  No significant stenosis at the carotid bifurcation. The internal carotid artery in the neck demonstrates expected flow signal.   Left carotid vessels:   There is expected flow signal in the visualized portion of the common carotid artery.  No significant stenosis at the carotid bifurcation. The internal carotid artery in the neck demonstrates expected flow signal.   Vertebral vessels:   The visualized segments of the cervical vertebral arteries demonstrate expected flow signal.       No evidence of significant stenosis on MRA of the neck.   MACRO: None   Signed by: Flakito Roman 2/17/2025 6:19 PM Dictation workstation:   [id number]    MR angio head wo IV contrast    Result Date: 2/17/2025  Interpreted By:  Flakito Roman, STUDY: MR ANGIO HEAD WO IV CONTRAST;  2/17/2025 5:56 pm   INDICATION: Signs/Symptoms:left facial droop.     COMPARISON: None.   ACCESSION NUMBER(S): [id number]   ORDERING CLINICIAN: LU LIZ   TECHNIQUE: Time-of-flight MRA of the head was performed. The images were reviewed as source images and maximum intensity projections.   FINDINGS: No intracranial major arterial occlusion. No aneurysms. No vascular malformations detected.       1.  No evidence for large branch vessel cutoffs of the visualized intracranial arterial vasculature. 2.  No definite aneurysm.   MACRO: None   Signed by: Flakito Roman 2/17/2025 6:17 PM Dictation workstation:   [id number]    MR brain wo IV contrast    Result Date: 2/17/2025  Interpreted By:  Flakito Roman, STUDY: MR BRAIN WO IV CONTRAST;  2/17/2025 5:56 pm   INDICATION: Signs/Symptoms:Left facial droop.     COMPARISON: CT head yesterday.   ACCESSION NUMBER(S): [id number]   ORDERING CLINICIAN: LU LIZ   TECHNIQUE: Axial T2, FLAIR, DWI, gradient  echo T2 and sagittal and coronal T1 weighted images of brain were acquired.   FINDINGS: No abnormally restricted diffusion.  No acute intracranial hemorrhage.   No intracranial mass. Major vascular flow voids intact.   Shallow fusiform prominence of the extra-axial spaces overlying the right frontal convexity and right lateral cerebellar hemisphere follows CSF signal intensity on all sequences. There is very mild right-to-left midline shift in the supratentorial compartment.   Mild to moderate white matter FLAIR signal increase, most commonly seen with chronic small vessel ischemic change.  Presence of older small infarctions in these areas not excluded.   Suspected old lacunar infarction inferior right lentiform nucleus.   Mild paranasal sinus/ethmoid mucosal inflammatory changes. No significant nonspecific mastoid fluid. Orbital contents unremarkable. No destructive osseous lesions identified.       1. No evidence of acute infarct, intracranial mass effect or significant midline shift. 2. Mild to moderate white matter FLAIR signal increase, most commonly seen with chronic small vessel ischemic change.  Presence of older small infarctions in these areas not excluded. 3. Suspected old lacunar infarction inferior right lentiform nucleus. 4. Shallow fusiform prominence of the extra-axial spaces overlying the right frontal convexity and right lateral cerebellar hemisphere follows CSF signal intensity on all sequences. There is very mild right-to-left midline shift in the supratentorial compartment. Suspect chronic subdural hematomas or hygromas.     MACRO: None   Signed by: Flakito Roman 2/17/2025 6:14 PM Dictation workstation:   [id number]         Assessment/Plan   Assessment & Plan  Hypernatremia    Flakito Mcfarlane is a 86 yo male with PMH of Parkinson's disease with physical debility, dementia, BPH, COPD, HTN who was sent to [facility] ED with AMS and facial droop.    In ED, labs were notable for sodium of 160 and creatinine  of 1.72.  CTH was negative for acute intracranial process.  He was given IV fluids and admitted.      Hypernatremia   - secondary to dehydration/poor water intake   - continue D5W; monitor sodium levels closely  - sodium improving appropriately to 153 this AM > 147   - stop  lasix  - nephrology consulted   - dietitian consult-->ensure supplements started TID    Acute metabolic encephalopathy   Left facial droop  - encephalopathy is likely 2/2 above, improving as hypernatremia improves   - facial droop- evaluated by neurology telehealth-->felt to be possibly Bell's palsy.    - MRI is negative for acute infarct; shows suspected old lacunar infarct, suspected chronic subdural hematomas or hygromas   - SLP consult- dysphagia diet with pureed/thin liquids     TC  Acute urinary retention    BPH  - improved with IV fluids   - renal ultrasound revealed urination retention   - difficult catheter placement due to urethral stricture-- required urologist to place catheter  - continue flomax  - plan for outpatient follow up with urology     Parkinson's disease   - continue sinemet--changed to immediate release formulation so tablets can be crushed-- monitor closely and adjust dose as needed.  May switch back to CR formulation if patient becomes able to swallow whole pills safely    Dementia   - continue namenda  - orientation protocol, sleep hygiene measures, encourage family presence at bedside     COPD   - stable, not in exacerbation; continue breathing treatments     HTN   - blood pressures stable, home medications currently held   - resume hydralazine as indicated   - unclear why patient was on lasix for hypertension-- discontinue in the setting of dehydration and hypernatremia     VTE/GI prophylaxis   - continue lovenox   - bowel reigmen in place     Discharge planning   - patient is from [facility] in [place] interested in finding new facility for him   - TCC/SW aware   - palliative care following > DNR/DNI and hospice  consult placed     Discussed with Dr. Booker.       Melba Brizuela, APRN-CNP

## 2025-02-20 LAB
ANION GAP SERPL CALC-SCNC: 9 MMOL/L (ref 10–20)
BASOPHILS # BLD AUTO: 0 X10*3/UL (ref 0–0.1)
BASOPHILS NFR BLD AUTO: 0 %
BUN SERPL-MCNC: 19 MG/DL (ref 6–23)
CALCIUM SERPL-MCNC: 7.8 MG/DL (ref 8.6–10.3)
CHLORIDE SERPL-SCNC: 114 MMOL/L (ref 98–107)
CO2 SERPL-SCNC: 29 MMOL/L (ref 21–32)
CREAT SERPL-MCNC: 1.01 MG/DL (ref 0.5–1.3)
EGFRCR SERPLBLD CKD-EPI 2021: 72 ML/MIN/1.73M*2
EOSINOPHIL # BLD AUTO: 0.2 X10*3/UL (ref 0–0.4)
EOSINOPHIL NFR BLD AUTO: 4.4 %
ERYTHROCYTE [DISTWIDTH] IN BLOOD BY AUTOMATED COUNT: 13.8 % (ref 11.5–14.5)
GLUCOSE SERPL-MCNC: 88 MG/DL (ref 74–99)
HCT VFR BLD AUTO: 32.2 % (ref 41–52)
HGB BLD-MCNC: 9.7 G/DL (ref 13.5–17.5)
IMM GRANULOCYTES # BLD AUTO: 0.03 X10*3/UL (ref 0–0.5)
IMM GRANULOCYTES NFR BLD AUTO: 0.7 % (ref 0–0.9)
LYMPHOCYTES # BLD AUTO: 1.59 X10*3/UL (ref 0.8–3)
LYMPHOCYTES NFR BLD AUTO: 34.9 %
MCH RBC QN AUTO: 28 PG (ref 26–34)
MCHC RBC AUTO-ENTMCNC: 30.1 G/DL (ref 32–36)
MCV RBC AUTO: 93 FL (ref 80–100)
MONOCYTES # BLD AUTO: 0.5 X10*3/UL (ref 0.05–0.8)
MONOCYTES NFR BLD AUTO: 11 %
NEUTROPHILS # BLD AUTO: 2.23 X10*3/UL (ref 1.6–5.5)
NEUTROPHILS NFR BLD AUTO: 49 %
NRBC BLD-RTO: 0 /100 WBCS (ref 0–0)
PLATELET # BLD AUTO: 125 X10*3/UL (ref 150–450)
POTASSIUM SERPL-SCNC: 3.6 MMOL/L (ref 3.5–5.3)
RBC # BLD AUTO: 3.46 X10*6/UL (ref 4.5–5.9)
SODIUM SERPL-SCNC: 148 MMOL/L (ref 136–145)
WBC # BLD AUTO: 4.6 X10*3/UL (ref 4.4–11.3)

## 2025-02-20 PROCEDURE — 2500000002 HC RX 250 W HCPCS SELF ADMINISTERED DRUGS (ALT 637 FOR MEDICARE OP, ALT 636 FOR OP/ED): Performed by: NURSE PRACTITIONER

## 2025-02-20 PROCEDURE — 94640 AIRWAY INHALATION TREATMENT: CPT

## 2025-02-20 PROCEDURE — 2500000004 HC RX 250 GENERAL PHARMACY W/ HCPCS (ALT 636 FOR OP/ED)

## 2025-02-20 PROCEDURE — 2500000004 HC RX 250 GENERAL PHARMACY W/ HCPCS (ALT 636 FOR OP/ED): Performed by: INTERNAL MEDICINE

## 2025-02-20 PROCEDURE — 92526 ORAL FUNCTION THERAPY: CPT | Mod: GN

## 2025-02-20 PROCEDURE — 80048 BASIC METABOLIC PNL TOTAL CA: CPT | Performed by: INTERNAL MEDICINE

## 2025-02-20 PROCEDURE — 36415 COLL VENOUS BLD VENIPUNCTURE: CPT | Performed by: INTERNAL MEDICINE

## 2025-02-20 PROCEDURE — 2500000001 HC RX 250 WO HCPCS SELF ADMINISTERED DRUGS (ALT 637 FOR MEDICARE OP): Performed by: INTERNAL MEDICINE

## 2025-02-20 PROCEDURE — 99232 SBSQ HOSP IP/OBS MODERATE 35: CPT | Performed by: INTERNAL MEDICINE

## 2025-02-20 PROCEDURE — 1100000001 HC PRIVATE ROOM DAILY

## 2025-02-20 PROCEDURE — 2500000002 HC RX 250 W HCPCS SELF ADMINISTERED DRUGS (ALT 637 FOR MEDICARE OP, ALT 636 FOR OP/ED): Performed by: INTERNAL MEDICINE

## 2025-02-20 PROCEDURE — 94664 DEMO&/EVAL PT USE INHALER: CPT

## 2025-02-20 PROCEDURE — 2500000001 HC RX 250 WO HCPCS SELF ADMINISTERED DRUGS (ALT 637 FOR MEDICARE OP): Performed by: NURSE PRACTITIONER

## 2025-02-20 PROCEDURE — 85025 COMPLETE CBC W/AUTO DIFF WBC: CPT | Performed by: INTERNAL MEDICINE

## 2025-02-20 RX ORDER — DEXTROSE MONOHYDRATE 50 MG/ML
75 INJECTION, SOLUTION INTRAVENOUS CONTINUOUS
Status: ACTIVE | OUTPATIENT
Start: 2025-02-20 | End: 2025-02-21

## 2025-02-20 RX ADMIN — CARBIDOPA AND LEVODOPA 3 TABLET: 25; 100 TABLET ORAL at 20:26

## 2025-02-20 RX ADMIN — ENOXAPARIN SODIUM 40 MG: 100 INJECTION SUBCUTANEOUS at 15:30

## 2025-02-20 RX ADMIN — TAMSULOSIN HYDROCHLORIDE 0.4 MG: 0.4 CAPSULE ORAL at 20:26

## 2025-02-20 RX ADMIN — SENNOSIDES 5 ML: 8.8 LIQUID ORAL at 09:22

## 2025-02-20 RX ADMIN — BUDESONIDE 0.5 MG: 0.5 INHALANT RESPIRATORY (INHALATION) at 20:37

## 2025-02-20 RX ADMIN — CARBIDOPA AND LEVODOPA 3 TABLET: 25; 100 TABLET ORAL at 15:30

## 2025-02-20 RX ADMIN — BUDESONIDE 0.5 MG: 0.5 INHALANT RESPIRATORY (INHALATION) at 07:42

## 2025-02-20 RX ADMIN — MEMANTINE 5 MG: 5 TABLET ORAL at 09:22

## 2025-02-20 RX ADMIN — DEXTROSE MONOHYDRATE 75 ML/HR: 50 INJECTION, SOLUTION INTRAVENOUS at 11:47

## 2025-02-20 RX ADMIN — CARBIDOPA AND LEVODOPA 3 TABLET: 25; 100 TABLET ORAL at 09:22

## 2025-02-20 RX ADMIN — SENNOSIDES 5 ML: 8.8 LIQUID ORAL at 20:26

## 2025-02-20 RX ADMIN — PANTOPRAZOLE SODIUM 40 MG: 40 TABLET, DELAYED RELEASE ORAL at 06:32

## 2025-02-20 RX ADMIN — MEMANTINE 5 MG: 5 TABLET ORAL at 20:26

## 2025-02-20 ASSESSMENT — PAIN SCALES - PAIN ASSESSMENT IN ADVANCED DEMENTIA (PAINAD)
FACIALEXPRESSION: SMILING OR INEXPRESSIVE
BODYLANGUAGE: RELAXED
TOTALSCORE: 0
CONSOLABILITY: NO NEED TO CONSOLE
BREATHING: NORMAL

## 2025-02-20 ASSESSMENT — COGNITIVE AND FUNCTIONAL STATUS - GENERAL
MOVING FROM LYING ON BACK TO SITTING ON SIDE OF FLAT BED WITH BEDRAILS: TOTAL
WALKING IN HOSPITAL ROOM: TOTAL
MOBILITY SCORE: 6
MOVING TO AND FROM BED TO CHAIR: TOTAL
EATING MEALS: TOTAL
TURNING FROM BACK TO SIDE WHILE IN FLAT BAD: TOTAL
CLIMB 3 TO 5 STEPS WITH RAILING: TOTAL
TOILETING: TOTAL
MOVING FROM LYING ON BACK TO SITTING ON SIDE OF FLAT BED WITH BEDRAILS: TOTAL
PERSONAL GROOMING: TOTAL
STANDING UP FROM CHAIR USING ARMS: TOTAL
MOVING TO AND FROM BED TO CHAIR: TOTAL
STANDING UP FROM CHAIR USING ARMS: TOTAL
DAILY ACTIVITIY SCORE: 6
DRESSING REGULAR UPPER BODY CLOTHING: TOTAL
HELP NEEDED FOR BATHING: TOTAL
DRESSING REGULAR LOWER BODY CLOTHING: TOTAL
MOBILITY SCORE: 6
CLIMB 3 TO 5 STEPS WITH RAILING: TOTAL
WALKING IN HOSPITAL ROOM: TOTAL
TURNING FROM BACK TO SIDE WHILE IN FLAT BAD: TOTAL

## 2025-02-20 ASSESSMENT — PAIN SCALES - GENERAL
PAINLEVEL_OUTOF10: 0 - NO PAIN
PAINLEVEL_OUTOF10: 0 - NO PAIN

## 2025-02-20 ASSESSMENT — PAIN - FUNCTIONAL ASSESSMENT: PAIN_FUNCTIONAL_ASSESSMENT: PAINAD (PAIN ASSESSMENT IN ADVANCED DEMENTIA SCALE)

## 2025-02-20 NOTE — CARE PLAN
Problem: Fall/Injury  Goal: Not fall by end of shift  Outcome: Progressing  Goal: Be free from injury by end of the shift  Outcome: Progressing  Goal: Verbalize understanding of personal risk factors for fall in the hospital  Outcome: Progressing  Goal: Verbalize understanding of risk factor reduction measures to prevent injury from fall in the home  Outcome: Progressing  Goal: Use assistive devices by end of the shift  Outcome: Progressing  Goal: Pace activities to prevent fatigue by end of the shift  Outcome: Progressing     Problem: Skin  Goal: Decreased wound size/increased tissue granulation at next dressing change  Outcome: Progressing  Flowsheets (Taken 2/20/2025 1055)  Decreased wound size/increased tissue granulation at next dressing change: Promote sleep for wound healing  Goal: Participates in plan/prevention/treatment measures  Outcome: Progressing  Goal: Prevent/manage excess moisture  Outcome: Progressing  Goal: Prevent/minimize sheer/friction injuries  Outcome: Progressing  Goal: Promote/optimize nutrition  Outcome: Progressing  Goal: Promote skin healing  Outcome: Progressing   The patient's goals for the shift include      The clinical goals for the shift include Patient will remain safe and free from falls during shift    Over the shift, the patient did not make progress toward the following goals. Barriers to progression include . Recommendations to address these barriers include .

## 2025-02-20 NOTE — CARE PLAN
The patient's goals for the shift include      The clinical goals for the shift include Patient will remain free from injury throughout this shift    Over the shift, the patient did not make progress toward the following goals. Barriers to progression include acute disease process. Recommendations to address these barriers include adherence to treatment plan.

## 2025-02-20 NOTE — PROGRESS NOTES
Speech-Language Pathology    SLP Adult Inpatient  Speech-Language Pathology Treatment     Patient Name: Flakito Mcfarlane  MRN: [id number]  Today's Date: 2/20/2025  Time Calculation  Start Time: 0922  Stop Time: 0937  Time Calculation (min): 15 min       Diet Recommendations:     Solid Diet Recommendations:   Pureed/extremely thick  (IDDSI Level 4)     Liquid Diet Recommendations:  Thin (IDDSI Level 0)     Compensatory Swallowing Strategies:   -Spoon presentations only  - Feed only when awake/alert/responsive, and stop if/when Pt's alertness wanes  -Upright 90 degrees as possible for all oral intake and medication passes  -Remain upright for 30-60 minutes after PO intake  -One to one assist with meals  -Single sips  -Small bites/sips  -Eat/feed slowly  -Check for pocketing of food        Medication Administration Recommendations:   Meds crushed in puree      SLP Assessment:  SLP TX Intervention Outcome: No Progress Made  Treatment Tolerance: Patient tolerated treatment well, Patient limited by fatigue  Medical Staff Made Aware: Yes    Pt appears unsafe for PO intake with current level of alertness/lethargy.  If Pt begins hospice care, rec'd safest diet likely to be puree PRN.       Plan:  Inpatient/Swing Bed or Outpatient: Inpatient  SLP TX Plan: Continue Plan of Care  SLP Plan: Skilled SLP      Subjective     General Visit Information:   Patient Seen During This Visit: Yes  Prior to Session Communication: Bedside nurse  Pt seen bedside for dysphagia tx. Pt lethargic, resting/sleeping with open mouth and protruding tongue position, but responded verbally to attempts to wake and question of whether he wanted to eat breakfast (available at bedside). Oral cavity and lips appear clean and healthy pink, did not appear as if needed oral care prior to PO trials.   Pt did not open eyes during session, responded to PO presentations at lips and took bolus from spoon 75% of presentations independently; remaining 25% of presentations  required verbal/tactile cueing to be aware of bolus.   SLP aware that family is considering hospice for Pt at this time.      Baseline Assessment:  Respiratory Status: Room air  Behavior/Cognition: Lethargic  Patient Positioning: Partially/Semi Reclined       Pain Assessment:  Pt did not report or demo evidence of pain or discomfort during study.       Objective     Therapeutic Swallow:  Therapeutic Swallow Intervention : PO Trials  Solid Diet Recommendations: Pureed/extremely thick  (IDDSI Level 4)  Liquid Diet Recommendations: Thin (IDDSI Level 0)    Pt trialed:   6x 1/2tsp puree  1x 1tsp puree with meds crushed (as provided by nsg)  3x 1/4-1/2tsp thin liquids (total of approx 5ml)    Pt readily took most boluses upon presentation to lips/tongue, however oral management and swallow initiation delayed. Pt required sternal rub and verbal cueing to wake to complete swallow with final few presentations, which included medication.  1/3x thin liquid presentations lost bilabially during oral holding.  No overt s/s airway aspiration noted  No further PO presented for Pt safety.

## 2025-02-20 NOTE — PROGRESS NOTES
02/20/25 7890   Discharge Planning   Assistance Needed per Palliative SW chart review; referral made to [facility] ; patient's cousin at bedside vebalized her comfort level with patient just being comfortable   Expected Discharge Disposition [facility]ospice[facility]   Does the patient need discharge transport arranged? Yes   RoundTrip coordination needed? Yes   Has discharge transport been arranged? No

## 2025-02-20 NOTE — PROGRESS NOTES
PALLIATIVE CARE SOCIAL WORK NOTE     Hospice order received yesterday afternoon. Pt is a long term resident of [facility], though per chart review, family is concerned about the care there and have been looking into moving him to a different facility. Referrals were made to several other facilities, however none have accepted. I spoke with his HCPOA Fantasma Jackson ([phone]) yesterday. We talked about hospice being another set of eyes, another set of hands, to supplement the care provided by the facility itself. She was agreeable to referral being made to [facility] which is the in-house hospice provider at [facility] and [facility]. Referral has been made to [facility] via [facility]. Have requested that [facility] reach out to pt's HCPOA and have asked if hospice assessment could take place here at the hospital prior to discharge. Will continue to follow as appropriate. Thank you.     ANTELMO Murray     Addendum 3:30 pm [facility] has come out to do an on-site assessment. Pt is appropriate for hospice care, but is not appropriate for their inpatient hospice unit. The plan will be for him to return to [facility], to his regular room, at time of discharge and [facility] will follow him there. [facility] RN has discussed this with pt's HCPHOOD Meek who is in agreement. Discharge arrangements per Care Transitions team. Thank you.

## 2025-02-20 NOTE — PROGRESS NOTES
Flakito Mcfarlane is a 85 y.o. male     Patient is more lethargic today  Sodium is rising again  Discussed with palliative care and they advised us that family is considering hospice  Will hold the mirtazapine as it could be causing to worsening send lethargy  Patient being fed by nursing aide  I suspect the patient will not be able to eat much by himself and agree with hospice approach    Review of Systems           Vitals:    02/20/25 1044   BP: 114/56   Pulse:    Resp:    Temp: 36.9 °C (98.4 °F)   SpO2: 93%        Scheduled medications  budesonide, 0.5 mg, nebulization, BID  carbidopa-levodopa, 3 tablet, oral, TID  enoxaparin, 40 mg, subcutaneous, q24h  memantine, 5 mg, oral, BID  [Held by provider] mirtazapine, 7.5 mg, oral, Nightly  pantoprazole, 40 mg, oral, Daily before breakfast   Or  pantoprazole, 40 mg, intravenous, Daily before breakfast  senna, 5 mL, oral, BID  tamsulosin, 0.4 mg, oral, Nightly      Continuous medications  dextrose 5%, 75 mL/hr, Last Rate: 75 mL/hr (02/20/25 1147)        PRN medications  PRN medications: acetaminophen **OR** acetaminophen **OR** acetaminophen, albuterol, bisacodyl, guaiFENesin, LORazepam, melatonin, ondansetron **OR** ondansetron, polyethylene glycol    Lab Review   Results from last 7 days   Lab Units 02/20/25  0546 02/19/25  0627 02/18/25  0704   WBC AUTO x10*3/uL 4.6 4.8 5.1   HEMOGLOBIN g/dL 9.7* 11.3* 10.5*   HEMATOCRIT % 32.2* 38.3* 35.4*   PLATELETS AUTO x10*3/uL 125* 119* 126*     Results from last 7 days   Lab Units 02/20/25  0545 02/19/25  0627 02/18/25  0704 02/17/25  0445 02/16/25  1333   SODIUM mmol/L 148* 147* 153*   < > 160*   POTASSIUM mmol/L 3.6 3.7 3.4*   < > 4.1   CHLORIDE mmol/L 114* 113* 117*   < > 125*   CO2 mmol/L 29 30 31   < > 30   BUN mg/dL 19 17 19   < > 37*   CREATININE mg/dL 1.01 0.99 0.99   < > 1.72*   CALCIUM mg/dL 7.8* 7.9* 7.9*   < > 8.4*   PROTEIN TOTAL g/dL  --   --   --   --  5.9*   BILIRUBIN TOTAL mg/dL  --   --   --   --  0.7   ALK PHOS U/L   --   --   --   --  62   ALT U/L  --   --   --   --  <3*   AST U/L  --   --   --   --  13   GLUCOSE mg/dL 88 89 89   < > 92    < > = values in this interval not displayed.     Results from last 7 days   Lab Units 02/16/25  1333   TROPHS ng/L 10        MR brain wo IV contrast   Final Result   1. No evidence of acute infarct, intracranial mass effect or   significant midline shift.   2. Mild to moderate white matter FLAIR signal increase, most commonly   seen with chronic small vessel ischemic change.  Presence of older   small infarctions in these areas not excluded.   3. Suspected old lacunar infarction inferior right lentiform nucleus.   4. Shallow fusiform prominence of the extra-axial spaces overlying   the right frontal convexity and right lateral cerebellar hemisphere   follows CSF signal intensity on all sequences. There is very mild   right-to-left midline shift in the supratentorial compartment.   Suspect chronic subdural hematomas or hygromas.             MACRO:   None        Signed by: Flakito Roman 2/17/2025 6:14 PM   Dictation workstation:   [id number]      MR angio head wo IV contrast   Final Result   1.  No evidence for large branch vessel cutoffs of the visualized   intracranial arterial vasculature.   2.  No definite aneurysm.        MACRO:   None        Signed by: Flakito Roman 2/17/2025 6:17 PM   Dictation workstation:   [id number]      MR angio neck wo IV contrast   Final Result   No evidence of significant stenosis on MRA of the neck.        MACRO:   None        Signed by: Flakito Roman 2/17/2025 6:19 PM   Dictation workstation:   [id number]      US renal complete   Final Result   No hydronephrosis bilaterally.    Right renal cyst.   Incidental finding: There are multiple stones within the gallbladder.   Signed by Christiano Loredo MD      XR chest 1 view   Final Result   Stable exam. No detectable active cardiopulmonary disease.   Signed by Seth Moise MD      CT brain attack head wo IV  contrast   Final Result   Addendum (preliminary) 1 of 1   Interpreted By:  Rodney Helms,    ADDENDUM:   Rodney Helms discussed the significance and urgency of this   critical finding by secure chat with  DEON PETERSEN on 2/16/2025 at   1:29 pm.  (**-RCF-**) Findings:  See findings.             Signed by: Rodney Helms 2/16/2025 1:30 PM        -------- ORIGINAL REPORT --------   Dictation workstation:   [id number]      Final   No evidence of acute cortical infarct or intracranial hemorrhage.        Atrophy and nonspecific low-density white matter changes.        Mucosal thickening paranasal sinuses with mild newly seen secretions   and/or fluid left maxillary and left sphenoid sinus. Correlate with   symptomatology.        MACRO:   None        Signed by: Rodney Helms 2/16/2025 1:21 PM   Dictation workstation:   [id number]            Physical Exam    Constitutional   General appearance: Alert  Pulmonary   Respiratory assessment: No respiratory distress, normal respiratory rhythm and effort.    Auscultation of Lungs: Clear bilateral breath sounds.   Cardiovascular   Auscultation of heart: Apical pulse normal, heart rate and rhythm normal, normal S1 and S2, no murmurs and no pericardial rub.    Exam for edema: No peripheral edema.   Abdomen   Abdominal Exam: No bruits, normal bowel sounds, soft, non-tender, no abdominal mass palpated.    Liver and Spleen exam: No hepato-splenomegaly.   Musculoskeletal     Neurologic   Alert x 1-2        Assessment/Plan      #Hypernatremia  #Encephalopathy  #Dehydration  #Bell's palsy  I sodium beginning to rise again  I suspect this will be an ongoing process and I agree with approaching hospice    #Parkinson disease with dementia  Poor appetite remains  Holding mirtazapine because it could be causing increased sedation    #Urine retention  Allison catheter placed  Monitor for kidney infections

## 2025-02-20 NOTE — PROGRESS NOTES
Flakito Mcfarlane is a 85 y.o. male on day 4 of admission presenting with Hypernatremia.      Subjective   Doing poorly still obtunded, patient currently on hospice       Objective        Vitals 24HR  Heart Rate:  [63]   Temp:  [36.3 °C (97.3 °F)-37.3 °C (99.1 °F)]   Resp:  [17-18]   BP: (103-137)/(54-62)   SpO2:  [92 %-95 %]     Intake/Output last 3 Shifts:    Intake/Output Summary (Last 24 hours) at 2/20/2025 1156  Last data filed at 2/20/2025 1044  Gross per 24 hour   Intake 240 ml   Output 250 ml   Net -10 ml       Physical Exam          Appearance: interactive and arousable with verbal or physical stimulation  Head and ENT: Normocephalic/atraumatic/supple neck/no JVD  Lungs; CTA  Heart: RRR  Abdomen; soft no tenderness organomegaly  Extremities; no edema  Neurologic: Altered mental status with left ischial droop  Totally noninteractive with obtundation and somnolence             Relevant Results     Results from last 7 days   Lab Units 02/20/25  0546 02/19/25  0627 02/18/25  0704   WBC AUTO x10*3/uL 4.6 4.8 5.1   HEMOGLOBIN g/dL 9.7* 11.3* 10.5*   HEMATOCRIT % 32.2* 38.3* 35.4*   PLATELETS AUTO x10*3/uL 125* 119* 126*      Results from last 7 days   Lab Units 02/20/25  0545 02/19/25  0627 02/18/25  0704   SODIUM mmol/L 148* 147* 153*   POTASSIUM mmol/L 3.6 3.7 3.4*   CHLORIDE mmol/L 114* 113* 117*   CO2 mmol/L 29 30 31   BUN mg/dL 19 17 19   CREATININE mg/dL 1.01 0.99 0.99   GLUCOSE mg/dL 88 89 89   CALCIUM mg/dL 7.8* 7.9* 7.9*        Current Facility-Administered Medications:     acetaminophen (Tylenol) tablet 650 mg, 650 mg, oral, q4h PRN **OR** acetaminophen (Tylenol) oral liquid 650 mg, 650 mg, oral, q4h PRN, 650 mg at 02/17/25 2100 **OR** acetaminophen (Tylenol) suppository 650 mg, 650 mg, rectal, q4h PRN, Luis M Booker MD    albuterol 2.5 mg /3 mL (0.083 %) nebulizer solution 2.5 mg, 2.5 mg, nebulization, q2h PRN, Gennaro Araujo MD    bisacodyl (Dulcolax) suppository 10 mg, 10 mg, rectal, Daily PRN, Peter A  MD Damián    budesonide (Pulmicort) 0.5 mg/2 mL nebulizer solution 0.5 mg, 0.5 mg, nebulization, BID, Luis M Booker MD, 0.5 mg at 02/20/25 0742    carbidopa-levodopa (Sinemet)  mg per tablet 3 tablet, 3 tablet, oral, TID, Dimple Washington, APRN-CNP, 3 tablet at 02/20/25 0922    dextrose 5% infusion, 75 mL/hr, intravenous, Continuous, Naomie Meyer, APRN-CNP, Last Rate: 75 mL/hr at 02/20/25 1147, 75 mL/hr at 02/20/25 1147    enoxaparin (Lovenox) syringe 40 mg, 40 mg, subcutaneous, q24h, Luis M Booker MD, 40 mg at 02/19/25 1622    guaiFENesin (Mucinex) 12 hr tablet 600 mg, 600 mg, oral, q12h PRN, Luis M Booker MD    LORazepam (Ativan) injection 1 mg, 1 mg, intravenous, q8h PRN, Luis M Booker MD, 1 mg at 02/17/25 0200    melatonin tablet 3 mg, 3 mg, oral, Nightly PRN, Luis M Booker MD, 3 mg at 02/18/25 2033    memantine (Namenda) tablet 5 mg, 5 mg, oral, BID, Luis M Booker MD, 5 mg at 02/20/25 0922    [Held by provider] mirtazapine (Remeron) tablet 7.5 mg, 7.5 mg, oral, Nightly, Luis M Booker MD, 7.5 mg at 02/19/25 2016    ondansetron (Zofran) tablet 4 mg, 4 mg, oral, q8h PRN **OR** ondansetron (Zofran) injection 4 mg, 4 mg, intravenous, q8h PRN, Luis M Booker MD    pantoprazole (ProtoNix) EC tablet 40 mg, 40 mg, oral, Daily before breakfast, 40 mg at 02/20/25 0632 **OR** pantoprazole (ProtoNix) injection 40 mg, 40 mg, intravenous, Daily before breakfast, Luis M Booker MD, 40 mg at 02/18/25 0624    polyethylene glycol (Glycolax, Miralax) packet 17 g, 17 g, oral, Daily PRN, Luis M Booker MD    senna (Senokot) 8.8 mg/5 mL syrup 5 mL, 5 mL, oral, BID, Matthew Steel MD, 5 mL at 02/20/25 0922    tamsulosin (Flomax) 24 hr capsule 0.4 mg, 0.4 mg, oral, Nightly, BIANKA Wells-CNP, 0.4 mg at 02/19/25 2016           Assessment/Plan        1.  TC on top of CKD, gradual resolving  No history of obstruction or renal calculi noted by ultrasound  2.  Hypernatremia due to lack of free water intake, started on  D5W at 75 cc/h  Sodium level now thought to 148 mmol/L  3.  Urinary bladder retention of 505 ml, was evaluated initially  Replace for Allison placement  4.  Mental status right CVA will follow with the medical team     CBC and BMP daily review all the labs examined patient and all consultants input     Hospice was consulted and patient will be moved to long-term resident of [facility]    Assessment & Plan  Hypernatremia       I spent 35 minutes in the professional and overall care of this patient.      Bobo Wang MD

## 2025-02-21 VITALS
HEART RATE: 70 BPM | OXYGEN SATURATION: 95 % | WEIGHT: 154.9 LBS | SYSTOLIC BLOOD PRESSURE: 105 MMHG | HEIGHT: 69 IN | TEMPERATURE: 98.6 F | DIASTOLIC BLOOD PRESSURE: 65 MMHG | RESPIRATION RATE: 16 BRPM | BODY MASS INDEX: 22.94 KG/M2

## 2025-02-21 LAB
ANION GAP SERPL CALC-SCNC: 8 MMOL/L (ref 10–20)
BASOPHILS # BLD AUTO: 0.01 X10*3/UL (ref 0–0.1)
BASOPHILS NFR BLD AUTO: 0.2 %
BUN SERPL-MCNC: 15 MG/DL (ref 6–23)
CALCIUM SERPL-MCNC: 7.8 MG/DL (ref 8.6–10.3)
CHLORIDE SERPL-SCNC: 110 MMOL/L (ref 98–107)
CO2 SERPL-SCNC: 27 MMOL/L (ref 21–32)
CREAT SERPL-MCNC: 0.91 MG/DL (ref 0.5–1.3)
EGFRCR SERPLBLD CKD-EPI 2021: 82 ML/MIN/1.73M*2
EOSINOPHIL # BLD AUTO: 0.2 X10*3/UL (ref 0–0.4)
EOSINOPHIL NFR BLD AUTO: 4 %
ERYTHROCYTE [DISTWIDTH] IN BLOOD BY AUTOMATED COUNT: 13.7 % (ref 11.5–14.5)
GLUCOSE SERPL-MCNC: 89 MG/DL (ref 74–99)
HCT VFR BLD AUTO: 32.8 % (ref 41–52)
HGB BLD-MCNC: 10.2 G/DL (ref 13.5–17.5)
IMM GRANULOCYTES # BLD AUTO: 0.02 X10*3/UL (ref 0–0.5)
IMM GRANULOCYTES NFR BLD AUTO: 0.4 % (ref 0–0.9)
LYMPHOCYTES # BLD AUTO: 1.52 X10*3/UL (ref 0.8–3)
LYMPHOCYTES NFR BLD AUTO: 30.2 %
MCH RBC QN AUTO: 28.1 PG (ref 26–34)
MCHC RBC AUTO-ENTMCNC: 31.1 G/DL (ref 32–36)
MCV RBC AUTO: 90 FL (ref 80–100)
MONOCYTES # BLD AUTO: 0.8 X10*3/UL (ref 0.05–0.8)
MONOCYTES NFR BLD AUTO: 15.9 %
NEUTROPHILS # BLD AUTO: 2.49 X10*3/UL (ref 1.6–5.5)
NEUTROPHILS NFR BLD AUTO: 49.3 %
NRBC BLD-RTO: 0 /100 WBCS (ref 0–0)
PLATELET # BLD AUTO: 123 X10*3/UL (ref 150–450)
POTASSIUM SERPL-SCNC: 3.3 MMOL/L (ref 3.5–5.3)
RBC # BLD AUTO: 3.63 X10*6/UL (ref 4.5–5.9)
SODIUM SERPL-SCNC: 142 MMOL/L (ref 136–145)
WBC # BLD AUTO: 5 X10*3/UL (ref 4.4–11.3)

## 2025-02-21 PROCEDURE — 2500000001 HC RX 250 WO HCPCS SELF ADMINISTERED DRUGS (ALT 637 FOR MEDICARE OP): Performed by: INTERNAL MEDICINE

## 2025-02-21 PROCEDURE — 94640 AIRWAY INHALATION TREATMENT: CPT

## 2025-02-21 PROCEDURE — 36415 COLL VENOUS BLD VENIPUNCTURE: CPT | Performed by: INTERNAL MEDICINE

## 2025-02-21 PROCEDURE — 99232 SBSQ HOSP IP/OBS MODERATE 35: CPT | Performed by: INTERNAL MEDICINE

## 2025-02-21 PROCEDURE — 2500000004 HC RX 250 GENERAL PHARMACY W/ HCPCS (ALT 636 FOR OP/ED): Performed by: INTERNAL MEDICINE

## 2025-02-21 PROCEDURE — 2500000001 HC RX 250 WO HCPCS SELF ADMINISTERED DRUGS (ALT 637 FOR MEDICARE OP): Performed by: NURSE PRACTITIONER

## 2025-02-21 PROCEDURE — 80048 BASIC METABOLIC PNL TOTAL CA: CPT | Performed by: INTERNAL MEDICINE

## 2025-02-21 PROCEDURE — 99239 HOSP IP/OBS DSCHRG MGMT >30: CPT | Performed by: INTERNAL MEDICINE

## 2025-02-21 PROCEDURE — 85025 COMPLETE CBC W/AUTO DIFF WBC: CPT | Performed by: INTERNAL MEDICINE

## 2025-02-21 PROCEDURE — 2500000002 HC RX 250 W HCPCS SELF ADMINISTERED DRUGS (ALT 637 FOR MEDICARE OP, ALT 636 FOR OP/ED): Performed by: INTERNAL MEDICINE

## 2025-02-21 RX ORDER — POTASSIUM CHLORIDE 1.5 G/1.58G
40 POWDER, FOR SOLUTION ORAL ONCE
Status: COMPLETED | OUTPATIENT
Start: 2025-02-21 | End: 2025-02-21

## 2025-02-21 RX ADMIN — PANTOPRAZOLE SODIUM 40 MG: 40 INJECTION, POWDER, FOR SOLUTION INTRAVENOUS at 06:22

## 2025-02-21 RX ADMIN — MEMANTINE 5 MG: 5 TABLET ORAL at 09:04

## 2025-02-21 RX ADMIN — CARBIDOPA AND LEVODOPA 3 TABLET: 25; 100 TABLET ORAL at 09:04

## 2025-02-21 RX ADMIN — POTASSIUM CHLORIDE 40 MEQ: 1.5 POWDER, FOR SOLUTION ORAL at 09:55

## 2025-02-21 RX ADMIN — BUDESONIDE 0.5 MG: 0.5 INHALANT RESPIRATORY (INHALATION) at 08:39

## 2025-02-21 RX ADMIN — ENOXAPARIN SODIUM 40 MG: 100 INJECTION SUBCUTANEOUS at 16:12

## 2025-02-21 RX ADMIN — SENNOSIDES 5 ML: 8.8 LIQUID ORAL at 09:05

## 2025-02-21 RX ADMIN — CARBIDOPA AND LEVODOPA 3 TABLET: 25; 100 TABLET ORAL at 16:12

## 2025-02-21 ASSESSMENT — PAIN SCALES - WONG BAKER: WONGBAKER_NUMERICALRESPONSE: NO HURT

## 2025-02-21 ASSESSMENT — PAIN SCALES - GENERAL: PAINLEVEL_OUTOF10: 0 - NO PAIN

## 2025-02-21 NOTE — PROGRESS NOTES
Music Therapy Note        Therapy Session  Referral Type: New referral this admission  Visit Type: New visit  Intervention Delivery: In-person  Conflict of Service: Asleep  Number of family members present: 0  Number of staff members present: 0             Narrative  Assessment Detail: At the time of assessment pt was asleep with no family present at bedside.  Follow-up: MT will follow up with pt as able.    Education Documentation  No documentation found.

## 2025-02-21 NOTE — PROGRESS NOTES
Speech-Language Pathology                 Therapy Communication Note    Patient Name: Flakito Mcfarlane  MRN: [id number]  Department: [place] [address]  Room: 626/626-A  Today's Date: 2/21/2025     Discipline: Speech Language Pathology    Comment: SLP aware that Pt is now active with hospice and no longer appropriate for ST tx. Will d/c this date. Please re-consult as needed.

## 2025-02-21 NOTE — PROGRESS NOTES
PALLIATIVE CARE SOCIAL WORK NOTE     Pt is scheduled to return to [facility] at 2:00 pm this afternoon. Have notified [facility] of transfer time so that they are able to follow up with him once he is there. Thank you.    ANTELMO Murray

## 2025-02-21 NOTE — PROGRESS NOTES
Flakito Mcfarlane is a 85 y.o. male on day 5 of admission presenting with Hypernatremia.      Subjective   More interactive than yesterday, confused but self verbalizing with no agitation.       Objective        Vitals 24HR  Heart Rate:  [57-85]   Temp:  [36.4 °C (97.5 °F)-37.5 °C (99.5 °F)]   Resp:  [16-19]   BP: (109-152)/(56-79)   SpO2:  [91 %-96 %]     Intake/Output last 3 Shifts:    Intake/Output Summary (Last 24 hours) at 2/21/2025 1203  Last data filed at 2/21/2025 1100  Gross per 24 hour   Intake --   Output 800 ml   Net -800 ml       Physical Exam          Appearance: interactive and arousable with verbal or physical stimulation  Head and ENT: Normocephalic/atraumatic/supple neck/no JVD  Lungs; CTA  Heart: RRR  Abdomen; soft no tenderness organomegaly  Extremities; no edema  Neurologic: Altered mental status with left ischial droop           Relevant Results     Results from last 7 days   Lab Units 02/21/25  0646 02/20/25  0546 02/19/25  0627   WBC AUTO x10*3/uL 5.0 4.6 4.8   HEMOGLOBIN g/dL 10.2* 9.7* 11.3*   HEMATOCRIT % 32.8* 32.2* 38.3*   PLATELETS AUTO x10*3/uL 123* 125* 119*      Results from last 7 days   Lab Units 02/21/25  0646 02/20/25  0545 02/19/25  0627   SODIUM mmol/L 142 148* 147*   POTASSIUM mmol/L 3.3* 3.6 3.7   CHLORIDE mmol/L 110* 114* 113*   CO2 mmol/L 27 29 30   BUN mg/dL 15 19 17   CREATININE mg/dL 0.91 1.01 0.99   GLUCOSE mg/dL 89 88 89   CALCIUM mg/dL 7.8* 7.8* 7.9*        Current Facility-Administered Medications:     acetaminophen (Tylenol) tablet 650 mg, 650 mg, oral, q4h PRN **OR** acetaminophen (Tylenol) oral liquid 650 mg, 650 mg, oral, q4h PRN, 650 mg at 02/17/25 2100 **OR** acetaminophen (Tylenol) suppository 650 mg, 650 mg, rectal, q4h PRN, Luis M Booker MD    albuterol 2.5 mg /3 mL (0.083 %) nebulizer solution 2.5 mg, 2.5 mg, nebulization, q2h PRN, Gennaro Araujo MD    bisacodyl (Dulcolax) suppository 10 mg, 10 mg, rectal, Daily PRN, Matthew Steel MD    budesonide (Pulmicort)  0.5 mg/2 mL nebulizer solution 0.5 mg, 0.5 mg, nebulization, BID, Luis M Booker MD, 0.5 mg at 02/21/25 0839    carbidopa-levodopa (Sinemet)  mg per tablet 3 tablet, 3 tablet, oral, TID, Dimple Washington, APRN-CNP, 3 tablet at 02/21/25 0904    enoxaparin (Lovenox) syringe 40 mg, 40 mg, subcutaneous, q24h, Luis M Booker MD, 40 mg at 02/20/25 1530    guaiFENesin (Mucinex) 12 hr tablet 600 mg, 600 mg, oral, q12h PRN, Luis M Booker MD    LORazepam (Ativan) injection 1 mg, 1 mg, intravenous, q8h PRN, Luis M Booker MD, 1 mg at 02/17/25 0200    melatonin tablet 3 mg, 3 mg, oral, Nightly PRN, Luis M Booker MD, 3 mg at 02/18/25 2033    memantine (Namenda) tablet 5 mg, 5 mg, oral, BID, Luis M Booker MD, 5 mg at 02/21/25 0904    [Held by provider] mirtazapine (Remeron) tablet 7.5 mg, 7.5 mg, oral, Nightly, Luis M Booker MD, 7.5 mg at 02/19/25 2016    ondansetron (Zofran) tablet 4 mg, 4 mg, oral, q8h PRN **OR** ondansetron (Zofran) injection 4 mg, 4 mg, intravenous, q8h PRN, Luis M Booker MD    pantoprazole (ProtoNix) EC tablet 40 mg, 40 mg, oral, Daily before breakfast, 40 mg at 02/20/25 0632 **OR** pantoprazole (ProtoNix) injection 40 mg, 40 mg, intravenous, Daily before breakfast, Luis M Booker MD, 40 mg at 02/21/25 0622    polyethylene glycol (Glycolax, Miralax) packet 17 g, 17 g, oral, Daily PRN, Luis M Booker MD    senna (Senokot) 8.8 mg/5 mL syrup 5 mL, 5 mL, oral, BID, Matthew Steel MD, 5 mL at 02/21/25 0905    tamsulosin (Flomax) 24 hr capsule 0.4 mg, 0.4 mg, oral, Nightly, BIANKA Wells-CNP, 0.4 mg at 02/20/25 2026           Assessment/Plan      1.  TC on top of CKD, gradual resolving  No history of obstruction or renal calculi noted by ultrasound  2.  Hypernatremia due to lack of free water intake, started on D5W at 75 cc/h  Sodium level now thought to 142 mmol/L  3.  Urinary bladder retention of 505 ml, was evaluated initially  Replace for Allison placement  4.  Mental status right CVA will  follow with the medical team     CBC and BMP daily review all the labs examined patient and all consultants input     Hospice was consulted and patient will be moved to long-term resident of [facility]           Assessment & Plan  Hypernatremia         I spent 35 minutes in the professional and overall care of this patient.      Bobo Wang MD

## 2025-02-21 NOTE — PROGRESS NOTES
02/21/25 0754   Discharge Planning   Support Systems Family members   Assistance Needed per Palliative care SW chart review;plan will be for him to return to [facility], to his regular room, at time of discharge and [facility] will follow him there. [facility] RN has discussed this with pt's STORMY Meek who is in agreement. Discharge arrangements per Care Transitions team.   Type of Residence Nursing home/residential care   Home or Post Acute Services Post acute facilities (Rehab/SNF/etc)   Expected Discharge Disposition Inter  ([place])   Does the patient need discharge transport arranged? Yes   RoundTrip coordination needed? Yes   Has discharge transport been arranged? No     The [employer] Vehicle you requested for Flakito DEL ROSARIO in unit/room 626A on 02/21/2025 is scheduled to arrive at 2:00pm EST! [facility] is handling this ride and you can contact them at [phone].   Report number; [phone]

## 2025-02-21 NOTE — CARE PLAN
The patient's goals for the shift include      The clinical goals for the shift include pt remains stable    Over the shift, the patient did not make progress toward the following goals. Barriers to progression include . Recommendations to address these barriers include .    Problem: Fall/Injury  Goal: Not fall by end of shift  Outcome: Progressing     Problem: Skin  Goal: Prevent/manage excess moisture  Outcome: Progressing

## 2025-02-21 NOTE — PROGRESS NOTES
"Flakito Mcfarlane is a 85 y.o. male on day 5 of admission presenting with Hypernatremia.    Subjective   Symptoms (0 - 10, Best to Worst)  [place] Symptom Assessment System  0-10 (Numeric) Pain Score: 0 - No pain  Hypernatremia resolved.   Less lethargic. Was able to drink two juices with me without difficulty.   Garbled speech, somewhat resistant to direction but eventually straightens arm to prevent IV occlusion eventually.              Objective     Physical Exam    Constitutional:       Comments: Tired appearing. Wakes to voice. Answers with short phrases, single words.   HENT:      Head: Atraumatic.      Mouth/Throat:      Mouth: Mucous membranes are moist.   Cardiovascular:      Rate and Rhythm: Normal rate and regular rhythm.   Pulmonary:      Effort: Pulmonary effort is normal.      Breath sounds: Normal breath sounds.   Abdominal:      General: Abdomen is flat. Bowel sounds are normal. There is no distension.      Tenderness: There is no abdominal tenderness. There is guarding.   Musculoskeletal:         General: No swelling.   Skin:     General: Skin is warm and dry.   Neurological:      Mental Status: He is disoriented.      Comments: LT facial droop. Cooperates with simple commands.      Last Recorded Vitals  Blood pressure 152/79, pulse 57, temperature 36.9 °C (98.5 °F), temperature source Oral, resp. rate 16, height 1.753 m (5' 9.02\"), weight 70.3 kg (154 lb 14.4 oz), SpO2 93%.  Intake/Output last 3 Shifts:  I/O last 3 completed shifts:  In: - (0 mL/kg)   Out: 325 (4.6 mL/kg) [Urine:325 (0.1 mL/kg/hr)]  Weight: 70.3 kg     Wt Readings from Last 8 Encounters:   02/16/25 73 kg (160 lb 15 oz)   01/18/22 66.7 kg (147 lb)   12/08/21 70.4 kg (155 lb 3 oz)   09/22/21 72.6 kg (160 lb)   07/07/21 75.8 kg (167 lb)   05/11/21 72.6 kg (160 lb)   01/13/21 78.5 kg (173 lb)   12/31/20 80.7 kg (178 lb)   2/17/2025 Reweigh on bed, 71.5kg.     Relevant Results  Scheduled medications  budesonide, 0.5 mg, nebulization, " BID  carbidopa-levodopa, 3 tablet, oral, TID  enoxaparin, 40 mg, subcutaneous, q24h  memantine, 5 mg, oral, BID  [Held by provider] mirtazapine, 7.5 mg, oral, Nightly  pantoprazole, 40 mg, oral, Daily before breakfast   Or  pantoprazole, 40 mg, intravenous, Daily before breakfast  potassium chloride, 40 mEq, oral, Once  senna, 5 mL, oral, BID  tamsulosin, 0.4 mg, oral, Nightly      Continuous medications  dextrose 5%, 75 mL/hr, Last Rate: 75 mL/hr (02/20/25 1147)        PRN medications  PRN medications: acetaminophen **OR** acetaminophen **OR** acetaminophen, albuterol, bisacodyl, guaiFENesin, LORazepam, melatonin, ondansetron **OR** ondansetron, polyethylene glycol    Results for orders placed or performed during the hospital encounter of 02/16/25 (from the past 24 hours)   Basic metabolic panel   Result Value Ref Range    Glucose 89 74 - 99 mg/dL    Sodium 142 136 - 145 mmol/L    Potassium 3.3 (L) 3.5 - 5.3 mmol/L    Chloride 110 (H) 98 - 107 mmol/L    Bicarbonate 27 21 - 32 mmol/L    Anion Gap 8 (L) 10 - 20 mmol/L    Urea Nitrogen 15 6 - 23 mg/dL    Creatinine 0.91 0.50 - 1.30 mg/dL    eGFR 82 >60 mL/min/1.73m*2    Calcium 7.8 (L) 8.6 - 10.3 mg/dL   CBC and Auto Differential   Result Value Ref Range    WBC 5.0 4.4 - 11.3 x10*3/uL    nRBC 0.0 0.0 - 0.0 /100 WBCs    RBC 3.63 (L) 4.50 - 5.90 x10*6/uL    Hemoglobin 10.2 (L) 13.5 - 17.5 g/dL    Hematocrit 32.8 (L) 41.0 - 52.0 %    MCV 90 80 - 100 fL    MCH 28.1 26.0 - 34.0 pg    MCHC 31.1 (L) 32.0 - 36.0 g/dL    RDW 13.7 11.5 - 14.5 %    Platelets 123 (L) 150 - 450 x10*3/uL    Neutrophils % 49.3 40.0 - 80.0 %    Immature Granulocytes %, Automated 0.4 0.0 - 0.9 %    Lymphocytes % 30.2 13.0 - 44.0 %    Monocytes % 15.9 2.0 - 10.0 %    Eosinophils % 4.0 0.0 - 6.0 %    Basophils % 0.2 0.0 - 2.0 %    Neutrophils Absolute 2.49 1.60 - 5.50 x10*3/uL    Immature Granulocytes Absolute, Automated 0.02 0.00 - 0.50 x10*3/uL    Lymphocytes Absolute 1.52 0.80 - 3.00 x10*3/uL     Monocytes Absolute 0.80 0.05 - 0.80 x10*3/uL    Eosinophils Absolute 0.20 0.00 - 0.40 x10*3/uL    Basophils Absolute 0.01 0.00 - 0.10 x10*3/uL                   MR angio neck wo IV contrast    Result Date: 2/17/2025  No evidence of significant stenosis on MRA of the neck.   MACRO: None   Signed by: Flakito Roman 2/17/2025 6:19 PM Dictation workstation:   [id number]    MR angio head wo IV contrast    Result Date: 2/17/2025  1.  No evidence for large branch vessel cutoffs of the visualized intracranial arterial vasculature. 2.  No definite aneurysm.   MACRO: None   Signed by: Flakito Roman 2/17/2025 6:17 PM Dictation workstation:   [id number]    MR brain wo IV contrast    Result Date: 2/17/2025  1. No evidence of acute infarct, intracranial mass effect or significant midline shift. 2. Mild to moderate white matter FLAIR signal increase, most commonly seen with chronic small vessel ischemic change.  Presence of older small infarctions in these areas not excluded. 3. Suspected old lacunar infarction inferior right lentiform nucleus. 4. Shallow fusiform prominence of the extra-axial spaces overlying the right frontal convexity and right lateral cerebellar hemisphere follows CSF signal intensity on all sequences. There is very mild right-to-left midline shift in the supratentorial compartment. Suspect chronic subdural hematomas or hygromas.     MACRO: None   Signed by: Flakito Roman 2/17/2025 6:14 PM Dictation workstation:   [id number]    US renal complete    Result Date: 2/17/2025  No hydronephrosis bilaterally. Right renal cyst. Incidental finding: There are multiple stones within the gallbladder. Signed by Christiano Loredo MD    XR chest 1 view    Result Date: 2/16/2025  Stable exam. No detectable active cardiopulmonary disease. Signed by Seth Moise MD    CT brain attack head wo IV contrast    Addendum Date: 2/16/2025    Interpreted By:  Rodney Helms, ADDENDUM: Rodney Helms discussed the significance and  "urgency of this critical finding by secure chat with  FUENTESLORENA TRINITY on 2/16/2025 at 1:29 pm.  (**-RCF-**) Findings:  See findings.     Signed by: Rodney Helms 2/16/2025 1:30 PM   -------- ORIGINAL REPORT -------- Dictation workstation:   [id number]    Result Date: 2/16/2025  No evidence of acute cortical infarct or intracranial hemorrhage.   Atrophy and nonspecific low-density white matter changes.   Mucosal thickening paranasal sinuses with mild newly seen secretions and/or fluid left maxillary and left sphenoid sinus. Correlate with symptomatology.   MACRO: None   Signed by: Rodney Helms 2/16/2025 1:21 PM Dictation workstation:   [id number]         Assessment/Plan   IMP:  86 yo M with PMH of parkison's disease and dementia presenting with LT face droop and AMS. Neurology is following for Bell's Palsy and to rule out CVA which they think is unlikely but for which patient is set to have an MRI. Nephrology is consulted for severe hypernatremia i/s/o of decreased intake. He has had some improvement. Mental status somewhat correlated to his sodium levels. Today he is more alert.  Palliative consulted for GOC.     2/18/2025 Conversation with Fantasma Jackson:   [facility] is home facility. Discussed current workup including MRI results.   He is usually comical/jovial when she visits at [facility] where they call him Uncle Flakito.  He was able to write his name recently, doesn't usually give his entire name.   2 daughters live her and 1 is in the [place]. She was made HCPOA because she was most involved in his care.   He has been eating well but she reports she saw him lose weight recently. He feeds himself and really enjoys eating, sometimes having other peoples food.   Discussed PEG nutrition i/s/o dementia.  We discussed disease progression.     Code status: He had previously said that \"he wants everything\" I explained that everyone wants everything done, but usually only the things that are helpful. In his " state of health, I am concerned that CPR would cause pain and suffering at the end of his life.   She agreed that while he wanted everything done in the past, the situation has changed and she believes his views on what's helpful would be different.  She would like to talk to patient's daughters because she wants them to have a say.         Issues:  Bell's Palsy  Hypernatremia  Encephalopathy, metabolic  Hypernatremia, moderate/severe  Chronic anemia  FTT  TC on admission, improved  PCM? Mild  Urinary retention        Plan:  - Continue aggressive treatment of acute issues.   - DNR DNI starting 2/19/25  - Hospice consult placed. D/w Afua Washington. He will return to [facility] with [facility].   - Continue with senna liquid 10ml bid. Will titrate to1 BM per day. At goal.   - Will continue to follow for GOC.         Patient/proxy preference for information  Prefers full information     Goals of Care  DNR DNI     Is the patient hospice-eligible?   Yes  Was a discussion held re hospice services?   no  Was a decision made re hospice services?  Unknown       I spent 40 minutes in the professional and overall care of this patient.        Matthew Steel MD

## 2025-02-21 NOTE — DISCHARGE SUMMARY
Discharge Diagnosis  Hypernatremia    Issues Requiring Follow-Up  Hospice    Test Results Pending At Discharge  Pending Labs       Order Current Status    Extra Urine Gray Tube Collected (02/16/25 1936)    Urinalysis with Reflex Culture and Microscopic In process            Hospital Course   Patient with a past medical history of dementia along with Parkinson's disease, benign prostatic hypertrophy, physical debility who is mostly bedbound was noted to have a facial droop today and was transferred to the hospital seen by neurology and they feel this could be Bell's palsy  Also noted to have significant hypernatremia  Patient was started on IV fluids initially with D5 half-normal and then switched to D5 water  Sodium trended down and returned close to normal  We stop the fluids and the patient's sodium started rising again  We started the patient on mirtazapine to help stimulate his appetite unfortunately it made him more sleepy therefore we stopped the mirtazapine  Patient's overall prognosis is poor because of worsening dementia and being bedbound  Family has decided to pursue hospice which in our opinion is medically appropriate  He will discharge in stable condition with hospice care    Pertinent Physical Exam At Time of Discharge  Physical Exam    Constitutional   General appearance: Fused with lethargy    Pulmonary   Respiratory assessment: No respiratory distress, normal respiratory rhythm and effort.    Auscultation of Lungs: Clear bilateral breath sounds.   Cardiovascular   Auscultation of heart: Apical pulse normal, heart rate and rhythm normal, normal S1 and S2, no murmurs and no pericardial rub.    Exam for edema: No peripheral edema.   Abdomen   Abdominal Exam: No bruits, normal bowel sounds, soft, non-tender, no abdominal mass palpated.    Liver and Spleen exam: No hepato-splenomegaly.       Skin   Skin inspection: Normal skin color and pigmentation, normal skin turgor and no visible rash.   Neurologic    Alert x 0-1.    Home Medications     Medication List      ASK your doctor about these medications     acetaminophen 325 mg tablet; Commonly known as: Tylenol   albuterol 90 mcg/actuation inhaler   budesonide 0.5 mg/2 mL nebulizer solution; Commonly known as: Pulmicort   carbidopa-levodopa  mg tablet; Commonly known as: Sinemet   furosemide 20 mg tablet; Commonly known as: Lasix   hydrALAZINE 10 mg tablet; Commonly known as: Apresoline   memantine 5 mg tablet; Commonly known as: Namenda   polyethylene glycol 17 gram packet; Commonly known as: Glycolax, Miralax   potassium chloride CR 20 mEq ER tablet; Commonly known as: Klor-Con M20   sennosides 8.6 mg tablet; Commonly known as: Senokot   tamsulosin 0.4 mg 24 hr capsule; Commonly known as: Flomax       Outpatient Follow-Up  No future appointments.    Patient seen at bedside. Events from the last visit reviewed. Discussed with staff. Results of tests and investigations from last visit reviewed and discussed with patient/Family. Electronic chart on [facility] reviewed. Input / Recommendations  from consultants  appreciated and reviewed and agreed with.     discharge summary and profile completed. medications reviewed and discussed with patient and family.  scripts completed and signed.     total discharge time in excess of 30 minutes.    Luis M Booker MD

## 2025-02-24 ENCOUNTER — NURSING HOME VISIT (OUTPATIENT)
Dept: POST ACUTE CARE | Facility: EXTERNAL LOCATION | Age: 85
End: 2025-02-24
Payer: COMMERCIAL

## 2025-02-24 DIAGNOSIS — R53.81 PHYSICAL DEBILITY: ICD-10-CM

## 2025-02-24 DIAGNOSIS — K59.00 CONSTIPATION, UNSPECIFIED CONSTIPATION TYPE: ICD-10-CM

## 2025-02-24 DIAGNOSIS — E87.0 HYPERNATREMIA: ICD-10-CM

## 2025-02-24 DIAGNOSIS — J44.9 ADVANCED COPD (MULTI): ICD-10-CM

## 2025-02-24 DIAGNOSIS — F02.C0 SEVERE DEMENTIA DUE TO PARKINSON'S DISEASE, WITHOUT BEHAVIORAL DISTURBANCE, PSYCHOTIC DISTURBANCE, MOOD DISTURBANCE, OR ANXIETY (MULTI): Primary | ICD-10-CM

## 2025-02-24 DIAGNOSIS — G20.A1 SEVERE DEMENTIA DUE TO PARKINSON'S DISEASE, WITHOUT BEHAVIORAL DISTURBANCE, PSYCHOTIC DISTURBANCE, MOOD DISTURBANCE, OR ANXIETY (MULTI): Primary | ICD-10-CM

## 2025-02-24 DIAGNOSIS — R33.8 ACUTE URINARY RETENTION: ICD-10-CM

## 2025-02-24 PROCEDURE — 99310 SBSQ NF CARE HIGH MDM 45: CPT | Performed by: PHYSICIAN ASSISTANT

## 2025-02-24 NOTE — LETTER
Patient: Flakito Mcfarlane  : 1940    Encounter Date: 2025    Subjective  Patient ID: Flakito Mcfarlane is a 85 y.o. male who is long term resident being seen and evaluated for multiple medical problems.   He has a medical hx of Parkinson's disease, HTN, Dementia/Alzheimer, BPH, covid (23) and physical debility. Resident is poor historian but typically is able to answer yes and no questions.     HPI   Hospital Course  Patient with a past medical history of dementia with Parkinson's disease, benign prostatic hypertrophy, hypertension, physical debility who is mostly bedbound was noted to have a facial droop on , a long with lethargy, pulse ox 88%, and altered mental status. He was transferred to the hospital seen by neurology and they felt he had Bell's palsy.  MRIs were negative for acute infarct.  He was also found to have significant hypernatremia, dehydration, urinary retention, acute kidney injury, and encephalopathy.  Patient was started on IV fluids initially with D5 half-normal and then switched to D5 water  Sodium trended down and returned close to normal  They stop the fluids and the patient's sodium started rising again  In the hospital they also started the patient on mirtazapine to help stimulate his appetite unfortunately it made him more sleepy therefore  the mirtazapine was discontinued.  Patient's overall prognosis is poor because of worsening dementia and being bedbound  Family decided to pursue hospice.  He was discharged in stable condition with hospice care  Patient return to [facility] on .  He has a Allison catheter in place for his urinary retention.    Today, on , patient was awake and was oriented to name and date of birth, but could not verbalize the current month or year or how old he was.  Patient speaks in one-word responses that are somewhat difficult to understand but would nod yes or no or say no when answering review of systems questions etc.  Patient  denied any pain, shortness of breath, nausea vomiting, chest pain or any other complaints at this time.    Past medical history: Bell's palsy, hypernatremia, TC, urinary retention, encephalopathy, Parkinson's with dementia, BPH, physical deconditioning, bedbound,.    Allergies: Penicillins    Medications: Budesonide, albuterol, MiraLAX, carbidopa-levodopa, memantine, potassium, sennosides, bisacodyl, Zofran, morphine as needed, hyoscyamine as needed, Ativan as needed, oxygen as needed    Review of systems: Patient denies chest pain, shortness of breath, nausea, vomiting, fever, chills, diarrhea, constipation,  vision changes, rashes, dysuria, paresthesias, vertigo, headache, cough or cold symptoms, or any other complaints at this time. A complete review of systems was done, and is as stated in the history of present illness, is otherwise negative or not pertinent to the complaint.    Objective  Blood pressure 128/66, temp 97.7, heart rate 68, respirations 18, SpO2 was 95%    Physical exam: Vital signs and nurses notes were reviewed.    General:  no acute distress. Alert and oriented to name and date of birth    Head: atraumatic and normocephalic    Eyes: EOMs are intact, conjunctivae is not injected.    Oropharynx:  no trismus or drooling, buccal mucosa is moist.    Ears:  normal external exam, no swelling or erythema,    Nasal: normal external exam,     Neck: Supple, full range of motion,      Cardiac: Regular rate and rhythm. No murmurs noted.     Pulmonary: Lungs clear bilaterally with good aeration. No adventitious breath sounds. No wheezes rales or rhonchi. No accessory muscle use no retraction noted.    Abdomen: Soft,  Nontender. No guarding, rigidity, or distention. Normoactive bowel sounds. No pulsatile masses, no bruits.     Extremities: Patient could move the extremity and could raise all 4 extremities slowly but weakly.   are weak but equal.  He has good dorsiflexion of the feet against resistance.   No pitting edema    Skin: No rash seen. Skin is warm and dry     Neuro: Patient is alert and oriented to person and date of birth.  This is his baseline or close to baseline per the staff.  They said he became more confused about 2 weeks ago.. Speech is slow and mostly one-word responses but is comprehensible.  Patient has a left facial droop but  no tongue deviation. Patient moves all extremities independently.     Note: Per staff patient is close to his baseline mental status.  He has been eating well.  No bowel movement today.    Assessment/Plan  Problem List Items Addressed This Visit             ICD-10-CM    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     Budesonide and albuterol as needed.  Oxygen as needed         Dementia due to Parkinson's disease, without behavioral disturbance, psychotic disturbance, mood disturbance, or anxiety (Multi) - Primary G20.A1, F02.80     Per staff patient is at baseline mental status.  Patient is also hospice.  Continue carbidopa-levodopa and memantine.         Constipation K59.00     Continue MiraLAX and sennosides and bisacodyl as needed         Physical debility R53.81     PT OT         Hypernatremia E87.0     BMP and CBC today and then as scheduled on a regular basis once a month.         Acute urinary retention R33.8     Patient had urinary retention in the hospital and came back with an indwelling Allison in place.  We will remove the Allison today and see if he can void on his own.  Regular bladder scans and monitoring of output ordered.  Patient has an order to replace the Allison if there is 350 cc or more on the bladder scan          Regi Hermosillo PA-C      Electronically Signed By: Regi Hermosillo PA-C   2/24/25  5:29 PM

## 2025-02-24 NOTE — ASSESSMENT & PLAN NOTE
Per staff patient is at baseline mental status.  Patient is also hospice.  Continue carbidopa-levodopa and memantine.

## 2025-02-24 NOTE — PROGRESS NOTES
Subjective   Patient ID: Flakito Mcfarlane is a 85 y.o. male who is long term resident being seen and evaluated for multiple medical problems.   He has a medical hx of Parkinson's disease, HTN, Dementia/Alzheimer, BPH, covid (1/18/23) and physical debility. Resident is poor historian but typically is able to answer yes and no questions.     HPI   Hospital Course  Patient with a past medical history of dementia with Parkinson's disease, benign prostatic hypertrophy, hypertension, physical debility who is mostly bedbound was noted to have a facial droop on 2/16, a long with lethargy, pulse ox 88%, and altered mental status. He was transferred to the hospital seen by neurology and they felt he had Bell's palsy.  MRIs were negative for acute infarct.  He was also found to have significant hypernatremia, dehydration, urinary retention, acute kidney injury, and encephalopathy.  Patient was started on IV fluids initially with D5 half-normal and then switched to D5 water  Sodium trended down and returned close to normal  They stop the fluids and the patient's sodium started rising again  In the hospital they also started the patient on mirtazapine to help stimulate his appetite unfortunately it made him more sleepy therefore  the mirtazapine was discontinued.  Patient's overall prognosis is poor because of worsening dementia and being bedbound  Family decided to pursue hospice.  He was discharged in stable condition with hospice care  Patient return to [facility] on February 21.  He has a Allison catheter in place for his urinary retention.    Today, on February 24, patient was awake and was oriented to name and date of birth, but could not verbalize the current month or year or how old he was.  Patient speaks in one-word responses that are somewhat difficult to understand but would nod yes or no or say no when answering review of systems questions etc.  Patient denied any pain, shortness of breath, nausea vomiting, chest pain or any  other complaints at this time.    Past medical history: Bell's palsy, hypernatremia, TC, urinary retention, encephalopathy, Parkinson's with dementia, BPH, physical deconditioning, bedbound,.    Allergies: Penicillins    Medications: Budesonide, albuterol, MiraLAX, carbidopa-levodopa, memantine, potassium, sennosides, bisacodyl, Zofran, morphine as needed, hyoscyamine as needed, Ativan as needed, oxygen as needed    Review of systems: Patient denies chest pain, shortness of breath, nausea, vomiting, fever, chills, diarrhea, constipation,  vision changes, rashes, dysuria, paresthesias, vertigo, headache, cough or cold symptoms, or any other complaints at this time. A complete review of systems was done, and is as stated in the history of present illness, is otherwise negative or not pertinent to the complaint.    Objective   Blood pressure 128/66, temp 97.7, heart rate 68, respirations 18, SpO2 was 95%    Physical exam: Vital signs and nurses notes were reviewed.    General:  no acute distress. Alert and oriented to name and date of birth    Head: atraumatic and normocephalic    Eyes: EOMs are intact, conjunctivae is not injected.    Oropharynx:  no trismus or drooling, buccal mucosa is moist.    Ears:  normal external exam, no swelling or erythema,    Nasal: normal external exam,     Neck: Supple, full range of motion,      Cardiac: Regular rate and rhythm. No murmurs noted.     Pulmonary: Lungs clear bilaterally with good aeration. No adventitious breath sounds. No wheezes rales or rhonchi. No accessory muscle use no retraction noted.    Abdomen: Soft,  Nontender. No guarding, rigidity, or distention. Normoactive bowel sounds. No pulsatile masses, no bruits.     Extremities: Patient could move the extremity and could raise all 4 extremities slowly but weakly.   are weak but equal.  He has good dorsiflexion of the feet against resistance.  No pitting edema    Skin: No rash seen. Skin is warm and dry     Neuro:  Patient is alert and oriented to person and date of birth.  This is his baseline or close to baseline per the staff.  They said he became more confused about 2 weeks ago.. Speech is slow and mostly one-word responses but is comprehensible.  Patient has a left facial droop but  no tongue deviation. Patient moves all extremities independently.     Note: Per staff patient is close to his baseline mental status.  He has been eating well.  No bowel movement today.    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  Oxygen as needed         Dementia due to Parkinson's disease, without behavioral disturbance, psychotic disturbance, mood disturbance, or anxiety (Multi) - Primary G20.A1, F02.80     Per staff patient is at baseline mental status.  Patient is also hospice.  Continue carbidopa-levodopa and memantine.         Constipation K59.00     Continue MiraLAX and sennosides and bisacodyl as needed         Physical debility R53.81     PT OT         Hypernatremia E87.0     BMP and CBC today and then as scheduled on a regular basis once a month.         Acute urinary retention R33.8     Patient had urinary retention in the hospital and came back with an indwelling Allison in place.  We will remove the Allison today and see if he can void on his own.  Regular bladder scans and monitoring of output ordered.  Patient has an order to replace the Allison if there is 350 cc or more on the bladder scan          Regi Hermosillo PA-C

## 2025-02-24 NOTE — ASSESSMENT & PLAN NOTE
Patient had urinary retention in the hospital and came back with an indwelling Allison in place.  We will remove the Allison today and see if he can void on his own.  Regular bladder scans and monitoring of output ordered.  Patient has an order to replace the Allison if there is 350 cc or more on the bladder scan

## 2025-02-27 LAB
ATRIAL RATE: 55 BPM
P OFFSET: 154 MS
P ONSET: 118 MS
Q ONSET: 212 MS
QRS COUNT: 9 BEATS
QRS DURATION: 74 MS
QT INTERVAL: 446 MS
QTC CALCULATION(BAZETT): 414 MS
QTC FREDERICIA: 425 MS
R AXIS: -31 DEGREES
T AXIS: -13 DEGREES
T OFFSET: 435 MS
VENTRICULAR RATE: 52 BPM

## 2025-02-28 LAB
P OFFSET: 161 MS
P ONSET: 141 MS
Q ONSET: 228 MS
QRS COUNT: 11 BEATS
QRS DURATION: 72 MS
QT INTERVAL: 394 MS
QTC CALCULATION(BAZETT): 413 MS
QTC FREDERICIA: 407 MS
R AXIS: -22 DEGREES
T AXIS: 19 DEGREES
T OFFSET: 425 MS
VENTRICULAR RATE: 66 BPM

## 2025-03-24 ENCOUNTER — NURSING HOME VISIT (OUTPATIENT)
Dept: POST ACUTE CARE | Facility: EXTERNAL LOCATION | Age: 85
End: 2025-03-24
Payer: COMMERCIAL

## 2025-03-24 DIAGNOSIS — R26.9 ABNORMAL GAIT: ICD-10-CM

## 2025-03-24 DIAGNOSIS — G20.A1 SEVERE DEMENTIA DUE TO PARKINSON'S DISEASE, WITHOUT BEHAVIORAL DISTURBANCE, PSYCHOTIC DISTURBANCE, MOOD DISTURBANCE, OR ANXIETY (MULTI): Primary | ICD-10-CM

## 2025-03-24 DIAGNOSIS — G30.1 LATE ONSET ALZHEIMER'S DISEASE WITH BEHAVIORAL DISTURBANCE (MULTI): ICD-10-CM

## 2025-03-24 DIAGNOSIS — F02.818 LATE ONSET ALZHEIMER'S DISEASE WITH BEHAVIORAL DISTURBANCE (MULTI): ICD-10-CM

## 2025-03-24 DIAGNOSIS — I10 PRIMARY HYPERTENSION: ICD-10-CM

## 2025-03-24 DIAGNOSIS — K59.00 CONSTIPATION, UNSPECIFIED CONSTIPATION TYPE: ICD-10-CM

## 2025-03-24 DIAGNOSIS — G20.B2 PARKINSON'S DISEASE WITH DYSKINESIA AND FLUCTUATING MANIFESTATIONS: ICD-10-CM

## 2025-03-24 DIAGNOSIS — F02.C0 SEVERE DEMENTIA DUE TO PARKINSON'S DISEASE, WITHOUT BEHAVIORAL DISTURBANCE, PSYCHOTIC DISTURBANCE, MOOD DISTURBANCE, OR ANXIETY (MULTI): Primary | ICD-10-CM

## 2025-03-24 DIAGNOSIS — L89.159 PRESSURE INJURY OF SKIN OF SACRAL REGION, UNSPECIFIED INJURY STAGE: ICD-10-CM

## 2025-03-24 PROBLEM — R53.81 PHYSICAL DECONDITIONING: Status: ACTIVE | Noted: 2025-03-24

## 2025-03-24 PROBLEM — R79.89 LOW VITAMIN D LEVEL: Status: ACTIVE | Noted: 2025-03-24

## 2025-03-24 PROBLEM — K40.90 INGUINAL HERNIA: Status: ACTIVE | Noted: 2025-03-24

## 2025-03-24 PROBLEM — R41.3 MEMORY LOSS: Status: ACTIVE | Noted: 2025-03-24

## 2025-03-24 PROBLEM — R44.3 HALLUCINATIONS: Status: ACTIVE | Noted: 2025-03-24

## 2025-03-24 PROBLEM — R63.4 ABNORMAL WEIGHT LOSS: Status: ACTIVE | Noted: 2025-03-24

## 2025-03-24 PROBLEM — Z85.46 HISTORY OF MALIGNANT NEOPLASM OF PROSTATE: Status: ACTIVE | Noted: 2025-03-24

## 2025-03-24 PROBLEM — R73.9 HYPERGLYCEMIA: Status: ACTIVE | Noted: 2025-03-24

## 2025-03-24 PROCEDURE — 99309 SBSQ NF CARE MODERATE MDM 30: CPT | Performed by: PHYSICIAN ASSISTANT

## 2025-03-24 NOTE — PROGRESS NOTES
PROGRESS NOTE    History Of Present Illness  Subjective    Patient ID: Flakito Mcfarlane is a 85 y.o. male who is long term resident being seen and evaluated for multiple medical problems.   He has a medical hx of Parkinson's disease, HTN, Dementia/Alzheimer, BPH, covid (1/18/23) and physical debility. Resident is poor historian but typically is able to answer yes and no questions.  Patient became a hospice patient as of February 28, 2025.  Most of his care and orders are directed by the hospice team.     HPI   Hospital Course  Patient with a past medical history of dementia with Parkinson's disease, benign prostatic hypertrophy, hypertension, physical debility who is mostly bed bound, recently treated for Bell's palsy and hypernatremia.  Patient also has a coccyx pressure wound that is being treated by the wound care team.  He is on Bactrim for positive wound culture with Proteus Mirabilis.  Patient's overall prognosis is poor secondary to worsening dementia and being bedbound  Family decided to pursue hospice.   Today, on March 24, 2025, patient was awake and was oriented to name and date of birth, but could not verbalize the current month or year or how old he was.  Patient speaks in one-word responses  when answering review of systems questions etc.  Patient denied any pain, shortness of breath, nausea vomiting, chest pain or any other complaints at this time.     Past medical history: Bell's palsy, hypernatremia, TC, urinary retention, encephalopathy, Parkinson's with dementia, BPH, physical deconditioning, bedbound,.  Hypertension, constipation, urinary retention, Alzheimer's, COPD, sacral/coccyx pressure wound.     Allergies: Penicillins     Medications: Budesonide, albuterol, MiraLAX as needed, Zofran as needed, Tylenol as needed, senna, carbidopa-levodopa, memantine, potassium, DuoNebs as needed, bisacodyl as needed, morphine as needed, hyoscyamine as needed, Ativan as needed, oxygen as needed, vitamin B and folic  acid     Review of systems: Patient denies chest pain, shortness of breath, nausea, vomiting, fever, chills, diarrhea, constipation,  vision changes, rashes, dysuria, paresthesias, vertigo, headache, cough or cold symptoms, or any other complaints at this time. A complete review of systems was done, and is as stated in the history of present illness, is otherwise negative or not pertinent to the complaint.        Objective  Blood pressure 114/62, heart rate 77, temp 98 degrees, SpO2 is 94% on room air     physical exam: Vital signs and nurses notes were reviewed.  Patient is alert, lying in bed    General:  no acute distress. Alert and oriented to name and date of birth, is at baseline     Head: atraumatic and normocephalic     Eyes: EOMs are intact, conjunctivae is not injected.     Oropharynx:  no trismus or drooling, buccal mucosa is moist.     Ears:  normal external exam, no swelling or erythema,     Nasal: normal external exam,      Neck: Supple, full range of motion,       Cardiac: Regular rate and rhythm. No murmurs noted.      Pulmonary: Lungs clear bilaterally with good aeration. No adventitious breath sounds. No wheezes rales or rhonchi. No accessory muscle use no retraction noted.     Abdomen: Soft,  Nontender. No guarding, rigidity, or distention. Normoactive bowel sounds. No pulsatile masses, no bruits.      Extremities: Patient follows commands and  could raise all 4 extremities slowly but weakly.    No pitting edema     Skin: No rash seen. Skin is warm and dry      Neuro: Patient is alert and oriented to person and date of birth.  This is his baseline or close to baseline per the staff.  Speech is slow and mostly one-word responses but is comprehensible.   Patient moves all extremities independently.     Note: Per staff patient is at his baseline mental status.  He has been eating well.  BM today.     Assessment/Plan   Problem List Items Addressed This Visit             ICD-10-CM    Dementia due to  Parkinson's disease, without behavioral disturbance, psychotic disturbance, mood disturbance, or anxiety (Multi) - Primary G20.A1, F02.80     patient is at baseline mental status. Patient is also hospice. Continue carbidopa-levodopa and memantine.  Patient is on a locked unit for safety although he does not ambulate.         Parkinson disease (Multi) G20.A1     Continue carbidopa-levodopa.         Primary hypertension I10     Blood pressure today 114/62, currently not on any antihypertensives         Constipation K59.00     Continue senna and MiraLAX as needed also bisacodyl as needed         Late onset Alzheimer's disease with behavioral disturbance (Multi) G30.1, F02.818     Continue memantine and hospice care as directed by the hospice team         Abnormal gait R26.9     Per the nursing staff patient does not ambulate.  He does get up to a wheelchair every other day.         Pressure injury of skin of sacral region L89.159     Wound care as directed by the wound team and hospice team             Reviewed recent labs, vitals, progress notes and documents pertaining to this visit.  CBC ordered to assess anemia.    Regi Hermosillo PA-C

## 2025-03-24 NOTE — LETTER
Patient: Flakito Mcfarlane  : 1940    Encounter Date: 2025    PROGRESS NOTE    History Of Present Illness  Subjective   Patient ID: Flakito Mcfarlane is a 85 y.o. male who is long term resident being seen and evaluated for multiple medical problems.   He has a medical hx of Parkinson's disease, HTN, Dementia/Alzheimer, BPH, covid (23) and physical debility. Resident is poor historian but typically is able to answer yes and no questions.  Patient became a hospice patient as of 2025.  Most of his care and orders are directed by the hospice team.     HPI   Hospital Course  Patient with a past medical history of dementia with Parkinson's disease, benign prostatic hypertrophy, hypertension, physical debility who is mostly bed bound, recently treated for Bell's palsy and hypernatremia.  Patient also has a coccyx pressure wound that is being treated by the wound care team.  He is on Bactrim for positive wound culture with Proteus Mirabilis.  Patient's overall prognosis is poor secondary to worsening dementia and being bedbound  Family decided to pursue hospice.   Today, on 2025, patient was awake and was oriented to name and date of birth, but could not verbalize the current month or year or how old he was.  Patient speaks in one-word responses  when answering review of systems questions etc.  Patient denied any pain, shortness of breath, nausea vomiting, chest pain or any other complaints at this time.     Past medical history: Bell's palsy, hypernatremia, TC, urinary retention, encephalopathy, Parkinson's with dementia, BPH, physical deconditioning, bedbound,.  Hypertension, constipation, urinary retention, Alzheimer's, COPD, sacral/coccyx pressure wound.     Allergies: Penicillins     Medications: Budesonide, albuterol, MiraLAX as needed, Zofran as needed, Tylenol as needed, senna, carbidopa-levodopa, memantine, potassium, DuoNebs as needed, bisacodyl as needed, morphine as needed, hyoscyamine  as needed, Ativan as needed, oxygen as needed, vitamin B and folic acid     Review of systems: Patient denies chest pain, shortness of breath, nausea, vomiting, fever, chills, diarrhea, constipation,  vision changes, rashes, dysuria, paresthesias, vertigo, headache, cough or cold symptoms, or any other complaints at this time. A complete review of systems was done, and is as stated in the history of present illness, is otherwise negative or not pertinent to the complaint.        Objective  Blood pressure 114/62, heart rate 77, temp 98 degrees, SpO2 is 94% on room air     physical exam: Vital signs and nurses notes were reviewed.  Patient is alert, lying in bed    General:  no acute distress. Alert and oriented to name and date of birth, is at baseline     Head: atraumatic and normocephalic     Eyes: EOMs are intact, conjunctivae is not injected.     Oropharynx:  no trismus or drooling, buccal mucosa is moist.     Ears:  normal external exam, no swelling or erythema,     Nasal: normal external exam,      Neck: Supple, full range of motion,       Cardiac: Regular rate and rhythm. No murmurs noted.      Pulmonary: Lungs clear bilaterally with good aeration. No adventitious breath sounds. No wheezes rales or rhonchi. No accessory muscle use no retraction noted.     Abdomen: Soft,  Nontender. No guarding, rigidity, or distention. Normoactive bowel sounds. No pulsatile masses, no bruits.      Extremities: Patient follows commands and  could raise all 4 extremities slowly but weakly.    No pitting edema     Skin: No rash seen. Skin is warm and dry      Neuro: Patient is alert and oriented to person and date of birth.  This is his baseline or close to baseline per the staff.  Speech is slow and mostly one-word responses but is comprehensible.   Patient moves all extremities independently.     Note: Per staff patient is at his baseline mental status.  He has been eating well.  BM today.     Assessment/Plan  Problem List  Items Addressed This Visit             ICD-10-CM    Dementia due to Parkinson's disease, without behavioral disturbance, psychotic disturbance, mood disturbance, or anxiety (Multi) - Primary G20.A1, F02.80     patient is at baseline mental status. Patient is also hospice. Continue carbidopa-levodopa and memantine.  Patient is on a locked unit for safety although he does not ambulate.         Parkinson disease (Multi) G20.A1     Continue carbidopa-levodopa.         Primary hypertension I10     Blood pressure today 114/62, currently not on any antihypertensives         Constipation K59.00     Continue senna and MiraLAX as needed also bisacodyl as needed         Late onset Alzheimer's disease with behavioral disturbance (Multi) G30.1, F02.818     Continue memantine and hospice care as directed by the hospice team         Abnormal gait R26.9     Per the nursing staff patient does not ambulate.  He does get up to a wheelchair every other day.         Pressure injury of skin of sacral region L89.159     Wound care as directed by the wound team and hospice team             Reviewed recent labs, vitals, progress notes and documents pertaining to this visit.  CBC ordered to assess anemia.    Regi Hermosillo PA-C      Electronically Signed By: Regi Hermosillo PA-C   3/24/25  4:19 PM

## 2025-03-24 NOTE — ASSESSMENT & PLAN NOTE
patient is at baseline mental status. Patient is also hospice. Continue carbidopa-levodopa and memantine.  Patient is on a locked unit for safety although he does not ambulate.

## 2025-04-15 ENCOUNTER — NURSING HOME VISIT (OUTPATIENT)
Dept: POST ACUTE CARE | Facility: EXTERNAL LOCATION | Age: 85
End: 2025-04-15
Payer: COMMERCIAL

## 2025-04-15 DIAGNOSIS — I10 PRIMARY HYPERTENSION: ICD-10-CM

## 2025-04-15 DIAGNOSIS — G20.B2 PARKINSON'S DISEASE WITH DYSKINESIA AND FLUCTUATING MANIFESTATIONS: ICD-10-CM

## 2025-04-15 DIAGNOSIS — K59.00 CONSTIPATION, UNSPECIFIED CONSTIPATION TYPE: ICD-10-CM

## 2025-04-15 DIAGNOSIS — F02.C0 SEVERE DEMENTIA DUE TO PARKINSON'S DISEASE, WITHOUT BEHAVIORAL DISTURBANCE, PSYCHOTIC DISTURBANCE, MOOD DISTURBANCE, OR ANXIETY (MULTI): Primary | ICD-10-CM

## 2025-04-15 DIAGNOSIS — R79.89 LOW VITAMIN D LEVEL: ICD-10-CM

## 2025-04-15 DIAGNOSIS — J44.9 ADVANCED COPD (MULTI): ICD-10-CM

## 2025-04-15 DIAGNOSIS — G20.A1 SEVERE DEMENTIA DUE TO PARKINSON'S DISEASE, WITHOUT BEHAVIORAL DISTURBANCE, PSYCHOTIC DISTURBANCE, MOOD DISTURBANCE, OR ANXIETY (MULTI): Primary | ICD-10-CM

## 2025-04-15 DIAGNOSIS — R53.81 PHYSICAL DEBILITY: ICD-10-CM

## 2025-04-15 DIAGNOSIS — L89.159 PRESSURE INJURY OF SKIN OF SACRAL REGION, UNSPECIFIED INJURY STAGE: ICD-10-CM

## 2025-04-15 PROCEDURE — 99308 SBSQ NF CARE LOW MDM 20: CPT | Performed by: PHYSICIAN ASSISTANT

## 2025-04-15 NOTE — LETTER
"Patient: Flakito Mcfarlane  : 1940    Encounter Date: 04/15/2025    PROGRESS NOTE    History Of Present Illness  Subjective  Patient ID: Flakito Mcfarlane is a 85 y.o. male who is long term resident being seen and evaluated for multiple medical problems.   He has a medical hx of Parkinson's disease, HTN, Dementia/Alzheimer, BPH, covid (23) and physical debility. Resident is poor historian but typically is able to answer yes and no questions.  Patient became a hospice patient as of 2025.  Most of his care and orders are directed by the hospice team.     HPI   Hospital Course  Patient with a past medical history of dementia with Parkinson's disease, benign prostatic hypertrophy, hypertension, physical debility who is mostly bed bound but today was in the common room in a wheelchair.  Recently treated for Bell's palsy and hypernatremia.  Patient also has a coccyx pressure wound that is being treated by the wound care team.  Family decided to pursue hospice.   Today, patient was awake and was oriented to name, but could not tell me his date of birth , and could not verbalize the current month or year or how old he was.  When I asked him how he was doing he said \"I feel okay.\"  Patient speaks in one-word responses  when answering review of systems questions etc.  Patient denied any pain, shortness of breath, nausea vomiting, chest pain or any other complaints at this time.     Past medical history: Bell's palsy, hypernatremia, TC, urinary retention, encephalopathy, Parkinson's with dementia, BPH, physical deconditioning, bedbound,.  Hypertension, constipation, urinary retention, Alzheimer's, COPD, sacral/coccyx pressure wound.     Allergies: Penicillins     Medications: Budesonide, albuterol, MiraLAX as needed, Zofran as needed, Tylenol as needed, senna, carbidopa-levodopa, memantine, potassium, DuoNebs as needed, bisacodyl as needed, morphine as needed, hyoscyamine as needed, Ativan as needed, oxygen as " needed, vitamin B and folic acid     Review of systems: Patient denies chest pain, shortness of breath, nausea, vomiting, fever, chills, diarrhea, constipation,  vision changes, rashes, dysuria, paresthesias, vertigo, headache, cough or cold symptoms, or any other complaints at this time. A complete review of systems was done, and is as stated in the history of present illness, is otherwise negative or not pertinent to the complaint.        Objective  Vitals on March 27 revealed a blood pressure of 156/53, temp 98.2, heart rate 57, SpO2 was 92% on room air.  I have asked for updated vitals.  Those are pending at this time     physical exam: Vital signs and nurses notes were reviewed.  Patient is sitting in a wheelchair in the common room and is alert and follows commands well.     General:  no acute distress. Alert and oriented to name      Head: atraumatic and normocephalic     Eyes: EOMs are intact, conjunctivae is not injected.     Oropharynx:  no trismus or drooling, buccal mucosa is moist.     Ears:  normal external exam, no swelling or erythema,     Nasal: normal external exam,      Neck: Supple, full range of motion,       Cardiac: Regular rate and rhythm. No murmurs noted.      Pulmonary: Lungs clear bilaterally with good aeration. No adventitious breath sounds. No wheezes rales or rhonchi. No accessory muscle use no retraction noted.     Abdomen: Soft,  Nontender. No guarding, rigidity, or distention. Normoactive bowel sounds. No pulsatile masses, no bruits.      Extremities: Patient follows commands and  could raise all 4 extremities slowly but weakly.    No pitting edema     Skin: No rash seen. Skin is warm and dry      Neuro: Patient is alert and oriented to person, but not to his age or date of birth today.  This is his baseline or close to baseline per the staff.  Speech is slow and mostly one-word responses but is comprehensible.   Patient moves all extremities weakly but independently.     Note: Per  staff patient is at his baseline mental status.  He has been eating well.  BM today.          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         Low vitamin D level R79.89     Continue supplements         Pressure injury of skin of sacral region L89.159     Continue care as directed by the wound team            Reviewed recent labs, vitals, progress notes and documents pertaining to this visit.  Creatinine and H&H are stable.    Regi Hermosillo PA-C      Electronically Signed By: Regi Hermosillo PA-C   4/15/25 11:47 AM

## 2025-04-15 NOTE — PROGRESS NOTES
"PROGRESS NOTE    History Of Present Illness  Subjective   Patient ID: Flakito Mcfarlane is a 85 y.o. male who is long term resident being seen and evaluated for multiple medical problems.   He has a medical hx of Parkinson's disease, HTN, Dementia/Alzheimer, BPH, covid (1/18/23) and physical debility. Resident is poor historian but typically is able to answer yes and no questions.  Patient became a hospice patient as of February 28, 2025.  Most of his care and orders are directed by the hospice team.     HPI   Hospital Course  Patient with a past medical history of dementia with Parkinson's disease, benign prostatic hypertrophy, hypertension, physical debility who is mostly bed bound but today was in the common room in a wheelchair.  Recently treated for Bell's palsy and hypernatremia.  Patient also has a coccyx pressure wound that is being treated by the wound care team.  Family decided to pursue hospice.   Today, patient was awake and was oriented to name, but could not tell me his date of birth , and could not verbalize the current month or year or how old he was.  When I asked him how he was doing he said \"I feel okay.\"  Patient speaks in one-word responses  when answering review of systems questions etc.  Patient denied any pain, shortness of breath, nausea vomiting, chest pain or any other complaints at this time.     Past medical history: Bell's palsy, hypernatremia, TC, urinary retention, encephalopathy, Parkinson's with dementia, BPH, physical deconditioning, bedbound,.  Hypertension, constipation, urinary retention, Alzheimer's, COPD, sacral/coccyx pressure wound.     Allergies: Penicillins     Medications: Budesonide, albuterol, MiraLAX as needed, Zofran as needed, Tylenol as needed, senna, carbidopa-levodopa, memantine, potassium, DuoNebs as needed, bisacodyl as needed, morphine as needed, hyoscyamine as needed, Ativan as needed, oxygen as needed, vitamin B and folic acid     Review of systems: Patient denies " chest pain, shortness of breath, nausea, vomiting, fever, chills, diarrhea, constipation,  vision changes, rashes, dysuria, paresthesias, vertigo, headache, cough or cold symptoms, or any other complaints at this time. A complete review of systems was done, and is as stated in the history of present illness, is otherwise negative or not pertinent to the complaint.        Objective  Vitals on March 27 revealed a blood pressure of 156/53, temp 98.2, heart rate 57, SpO2 was 92% on room air.  I have asked for updated vitals.  Those are pending at this time     physical exam: Vital signs and nurses notes were reviewed.  Patient is sitting in a wheelchair in the common room and is alert and follows commands well.     General:  no acute distress. Alert and oriented to name      Head: atraumatic and normocephalic     Eyes: EOMs are intact, conjunctivae is not injected.     Oropharynx:  no trismus or drooling, buccal mucosa is moist.     Ears:  normal external exam, no swelling or erythema,     Nasal: normal external exam,      Neck: Supple, full range of motion,       Cardiac: Regular rate and rhythm. No murmurs noted.      Pulmonary: Lungs clear bilaterally with good aeration. No adventitious breath sounds. No wheezes rales or rhonchi. No accessory muscle use no retraction noted.     Abdomen: Soft,  Nontender. No guarding, rigidity, or distention. Normoactive bowel sounds. No pulsatile masses, no bruits.      Extremities: Patient follows commands and  could raise all 4 extremities slowly but weakly.    No pitting edema     Skin: No rash seen. Skin is warm and dry      Neuro: Patient is alert and oriented to person, but not to his age or date of birth today.  This is his baseline or close to baseline per the staff.  Speech is slow and mostly one-word responses but is comprehensible.   Patient moves all extremities weakly but independently.     Note: Per staff patient is at his baseline mental status.  He has been eating  well.  BM today.          Assessment/Plan   Problem List Items Addressed This Visit             ICD-10-CM    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     Continue budesonide and albuterol and DuoNebs as ordered also oxygen as needed         Dementia due to Parkinson's disease, without behavioral disturbance, psychotic disturbance, mood disturbance, or anxiety (Multi) - Primary G20.A1, F02.80     Continue memantine         Parkinson disease (Multi) G20.A1     Continue carbidopa-levodopa         Primary hypertension I10     Currently not on any antihypertensives         Constipation K59.00     MiraLAX as needed, senna         Physical debility R53.81     PT and OT if directed by hospice team, patient does not ambulate.         Low vitamin D level R79.89     Continue supplements         Pressure injury of skin of sacral region L89.159     Continue care as directed by the wound team            Reviewed recent labs, vitals, progress notes and documents pertaining to this visit.  Creatinine and H&H are stable.    Regi Hermosillo PA-C